# Patient Record
Sex: FEMALE | Race: WHITE | Employment: FULL TIME | URBAN - NONMETROPOLITAN AREA
[De-identification: names, ages, dates, MRNs, and addresses within clinical notes are randomized per-mention and may not be internally consistent; named-entity substitution may affect disease eponyms.]

---

## 2017-02-26 DIAGNOSIS — E11.8 TYPE 2 DIABETES MELLITUS WITH COMPLICATION (HCC): ICD-10-CM

## 2017-02-27 RX ORDER — LOSARTAN POTASSIUM AND HYDROCHLOROTHIAZIDE 12.5; 5 MG/1; MG/1
TABLET ORAL
Qty: 180 TABLET | Refills: 3 | Status: SHIPPED | OUTPATIENT
Start: 2017-02-27 | End: 2018-04-27 | Stop reason: SDUPTHER

## 2017-02-27 RX ORDER — PIOGLITAZONEHYDROCHLORIDE 30 MG/1
TABLET ORAL
Qty: 90 TABLET | Refills: 3 | Status: SHIPPED | OUTPATIENT
Start: 2017-02-27 | End: 2018-03-27

## 2017-03-24 ENCOUNTER — OFFICE VISIT (OUTPATIENT)
Dept: FAMILY MEDICINE CLINIC | Age: 77
End: 2017-03-24

## 2017-03-24 VITALS — SYSTOLIC BLOOD PRESSURE: 128 MMHG | OXYGEN SATURATION: 98 % | DIASTOLIC BLOOD PRESSURE: 74 MMHG | HEART RATE: 82 BPM

## 2017-03-24 DIAGNOSIS — I83.019 VENOUS ULCER OF RIGHT LEG (HCC): ICD-10-CM

## 2017-03-24 DIAGNOSIS — E11.8 TYPE 2 DIABETES MELLITUS WITH COMPLICATION, UNSPECIFIED LONG TERM INSULIN USE STATUS: Primary | ICD-10-CM

## 2017-03-24 DIAGNOSIS — K64.4 BLEEDING EXTERNAL HEMORRHOIDS: ICD-10-CM

## 2017-03-24 DIAGNOSIS — M15.9 PRIMARY OSTEOARTHRITIS INVOLVING MULTIPLE JOINTS: ICD-10-CM

## 2017-03-24 DIAGNOSIS — E11.21 DIABETIC NEPHROPATHY ASSOCIATED WITH TYPE 2 DIABETES MELLITUS (HCC): ICD-10-CM

## 2017-03-24 DIAGNOSIS — I87.2 VENOUS INSUFFICIENCY: ICD-10-CM

## 2017-03-24 DIAGNOSIS — I10 BENIGN ESSENTIAL HTN: ICD-10-CM

## 2017-03-24 DIAGNOSIS — E78.2 MIXED HYPERLIPIDEMIA: ICD-10-CM

## 2017-03-24 DIAGNOSIS — L97.919 VENOUS ULCER OF RIGHT LEG (HCC): ICD-10-CM

## 2017-03-24 DIAGNOSIS — D50.8 OTHER IRON DEFICIENCY ANEMIA: ICD-10-CM

## 2017-03-24 DIAGNOSIS — I70.1 RENAL ARTERY STENOSIS (HCC): ICD-10-CM

## 2017-03-24 PROBLEM — M15.0 PRIMARY OSTEOARTHRITIS INVOLVING MULTIPLE JOINTS: Status: ACTIVE | Noted: 2017-03-24

## 2017-03-24 LAB
ANION GAP SERPL CALCULATED.3IONS-SCNC: 15 MMOL/L (ref 3–16)
BASOPHILS ABSOLUTE: 0.1 K/UL (ref 0–0.2)
BASOPHILS RELATIVE PERCENT: 1.1 %
BUN BLDV-MCNC: 25 MG/DL (ref 7–20)
CALCIUM SERPL-MCNC: 9.2 MG/DL (ref 8.3–10.6)
CHLORIDE BLD-SCNC: 102 MMOL/L (ref 99–110)
CO2: 24 MMOL/L (ref 21–32)
CREAT SERPL-MCNC: 1.1 MG/DL (ref 0.6–1.2)
EOSINOPHILS ABSOLUTE: 0.2 K/UL (ref 0–0.6)
EOSINOPHILS RELATIVE PERCENT: 2.9 %
GFR AFRICAN AMERICAN: 58
GFR NON-AFRICAN AMERICAN: 48
GLUCOSE BLD-MCNC: 120 MG/DL (ref 70–99)
HCT VFR BLD CALC: 33.6 % (ref 36–48)
HEMOGLOBIN: 10.7 G/DL (ref 12–16)
LYMPHOCYTES ABSOLUTE: 1.7 K/UL (ref 1–5.1)
LYMPHOCYTES RELATIVE PERCENT: 22.5 %
MCH RBC QN AUTO: 26.9 PG (ref 26–34)
MCHC RBC AUTO-ENTMCNC: 31.7 G/DL (ref 31–36)
MCV RBC AUTO: 84.9 FL (ref 80–100)
MONOCYTES ABSOLUTE: 0.7 K/UL (ref 0–1.3)
MONOCYTES RELATIVE PERCENT: 8.9 %
NEUTROPHILS ABSOLUTE: 5 K/UL (ref 1.7–7.7)
NEUTROPHILS RELATIVE PERCENT: 64.6 %
PDW BLD-RTO: 15 % (ref 12.4–15.4)
PLATELET # BLD: 320 K/UL (ref 135–450)
PMV BLD AUTO: 8.3 FL (ref 5–10.5)
POTASSIUM SERPL-SCNC: 4.6 MMOL/L (ref 3.5–5.1)
RBC # BLD: 3.96 M/UL (ref 4–5.2)
SODIUM BLD-SCNC: 141 MMOL/L (ref 136–145)
WBC # BLD: 7.7 K/UL (ref 4–11)

## 2017-03-24 PROCEDURE — G8484 FLU IMMUNIZE NO ADMIN: HCPCS | Performed by: FAMILY MEDICINE

## 2017-03-24 PROCEDURE — 4040F PNEUMOC VAC/ADMIN/RCVD: CPT | Performed by: FAMILY MEDICINE

## 2017-03-24 PROCEDURE — 1123F ACP DISCUSS/DSCN MKR DOCD: CPT | Performed by: FAMILY MEDICINE

## 2017-03-24 PROCEDURE — 36415 COLL VENOUS BLD VENIPUNCTURE: CPT | Performed by: FAMILY MEDICINE

## 2017-03-24 PROCEDURE — 1036F TOBACCO NON-USER: CPT | Performed by: FAMILY MEDICINE

## 2017-03-24 PROCEDURE — G8598 ASA/ANTIPLAT THER USED: HCPCS | Performed by: FAMILY MEDICINE

## 2017-03-24 PROCEDURE — G8400 PT W/DXA NO RESULTS DOC: HCPCS | Performed by: FAMILY MEDICINE

## 2017-03-24 PROCEDURE — G8417 CALC BMI ABV UP PARAM F/U: HCPCS | Performed by: FAMILY MEDICINE

## 2017-03-24 PROCEDURE — G8427 DOCREV CUR MEDS BY ELIG CLIN: HCPCS | Performed by: FAMILY MEDICINE

## 2017-03-24 PROCEDURE — 99214 OFFICE O/P EST MOD 30 MIN: CPT | Performed by: FAMILY MEDICINE

## 2017-03-24 PROCEDURE — 1090F PRES/ABSN URINE INCON ASSESS: CPT | Performed by: FAMILY MEDICINE

## 2017-03-24 ASSESSMENT — PATIENT HEALTH QUESTIONNAIRE - PHQ9
1. LITTLE INTEREST OR PLEASURE IN DOING THINGS: 0
SUM OF ALL RESPONSES TO PHQ QUESTIONS 1-9: 0
SUM OF ALL RESPONSES TO PHQ9 QUESTIONS 1 & 2: 0
2. FEELING DOWN, DEPRESSED OR HOPELESS: 0

## 2017-03-25 LAB
ESTIMATED AVERAGE GLUCOSE: 142.7 MG/DL
HBA1C MFR BLD: 6.6 %

## 2017-07-03 DIAGNOSIS — E11.8 TYPE 2 DIABETES MELLITUS WITH COMPLICATION (HCC): ICD-10-CM

## 2017-07-25 RX ORDER — CELECOXIB 200 MG/1
200 CAPSULE ORAL 2 TIMES DAILY
Qty: 180 CAPSULE | Refills: 3 | Status: ON HOLD | OUTPATIENT
Start: 2017-07-25 | End: 2019-07-05 | Stop reason: HOSPADM

## 2017-09-22 ENCOUNTER — OFFICE VISIT (OUTPATIENT)
Dept: FAMILY MEDICINE CLINIC | Age: 77
End: 2017-09-22

## 2017-09-22 VITALS — OXYGEN SATURATION: 97 % | DIASTOLIC BLOOD PRESSURE: 74 MMHG | SYSTOLIC BLOOD PRESSURE: 130 MMHG | HEART RATE: 91 BPM

## 2017-09-22 DIAGNOSIS — D64.9 ANEMIA, UNSPECIFIED TYPE: ICD-10-CM

## 2017-09-22 DIAGNOSIS — E11.8 TYPE 2 DIABETES MELLITUS WITH COMPLICATION, UNSPECIFIED LONG TERM INSULIN USE STATUS: ICD-10-CM

## 2017-09-22 DIAGNOSIS — E78.2 MIXED HYPERLIPIDEMIA: ICD-10-CM

## 2017-09-22 DIAGNOSIS — I10 BENIGN ESSENTIAL HTN: ICD-10-CM

## 2017-09-22 DIAGNOSIS — M15.9 PRIMARY OSTEOARTHRITIS INVOLVING MULTIPLE JOINTS: Primary | ICD-10-CM

## 2017-09-22 DIAGNOSIS — I87.2 VENOUS INSUFFICIENCY: ICD-10-CM

## 2017-09-22 DIAGNOSIS — K64.4 BLEEDING EXTERNAL HEMORRHOIDS: ICD-10-CM

## 2017-09-22 DIAGNOSIS — I70.1 RENAL ARTERY STENOSIS (HCC): ICD-10-CM

## 2017-09-22 DIAGNOSIS — M65.332 TRIGGER MIDDLE FINGER OF LEFT HAND: ICD-10-CM

## 2017-09-22 DIAGNOSIS — I65.29 STENOSIS OF CAROTID ARTERY, UNSPECIFIED LATERALITY: ICD-10-CM

## 2017-09-22 LAB
ANION GAP SERPL CALCULATED.3IONS-SCNC: 17 MMOL/L (ref 3–16)
BASOPHILS ABSOLUTE: 0.1 K/UL (ref 0–0.2)
BASOPHILS RELATIVE PERCENT: 0.5 %
BUN BLDV-MCNC: 27 MG/DL (ref 7–20)
CALCIUM SERPL-MCNC: 9.1 MG/DL (ref 8.3–10.6)
CHLORIDE BLD-SCNC: 102 MMOL/L (ref 99–110)
CO2: 22 MMOL/L (ref 21–32)
CREAT SERPL-MCNC: 1.1 MG/DL (ref 0.6–1.2)
EOSINOPHILS ABSOLUTE: 0.2 K/UL (ref 0–0.6)
EOSINOPHILS RELATIVE PERCENT: 2.3 %
GFR AFRICAN AMERICAN: 58
GFR NON-AFRICAN AMERICAN: 48
GLUCOSE BLD-MCNC: 256 MG/DL (ref 70–99)
HCT VFR BLD CALC: 33.7 % (ref 36–48)
HEMOGLOBIN: 10.7 G/DL (ref 12–16)
LYMPHOCYTES ABSOLUTE: 1.7 K/UL (ref 1–5.1)
LYMPHOCYTES RELATIVE PERCENT: 18.3 %
MCH RBC QN AUTO: 26.9 PG (ref 26–34)
MCHC RBC AUTO-ENTMCNC: 31.9 G/DL (ref 31–36)
MCV RBC AUTO: 84.3 FL (ref 80–100)
MONOCYTES ABSOLUTE: 0.7 K/UL (ref 0–1.3)
MONOCYTES RELATIVE PERCENT: 7.5 %
NEUTROPHILS ABSOLUTE: 6.7 K/UL (ref 1.7–7.7)
NEUTROPHILS RELATIVE PERCENT: 71.4 %
PDW BLD-RTO: 15.6 % (ref 12.4–15.4)
PLATELET # BLD: 316 K/UL (ref 135–450)
PMV BLD AUTO: 8.4 FL (ref 5–10.5)
POTASSIUM SERPL-SCNC: 4.4 MMOL/L (ref 3.5–5.1)
RBC # BLD: 3.99 M/UL (ref 4–5.2)
SODIUM BLD-SCNC: 141 MMOL/L (ref 136–145)
WBC # BLD: 9.4 K/UL (ref 4–11)

## 2017-09-22 PROCEDURE — 36415 COLL VENOUS BLD VENIPUNCTURE: CPT | Performed by: FAMILY MEDICINE

## 2017-09-22 PROCEDURE — G8427 DOCREV CUR MEDS BY ELIG CLIN: HCPCS | Performed by: FAMILY MEDICINE

## 2017-09-22 PROCEDURE — 1123F ACP DISCUSS/DSCN MKR DOCD: CPT | Performed by: FAMILY MEDICINE

## 2017-09-22 PROCEDURE — 1036F TOBACCO NON-USER: CPT | Performed by: FAMILY MEDICINE

## 2017-09-22 PROCEDURE — 1090F PRES/ABSN URINE INCON ASSESS: CPT | Performed by: FAMILY MEDICINE

## 2017-09-22 PROCEDURE — G8421 BMI NOT CALCULATED: HCPCS | Performed by: FAMILY MEDICINE

## 2017-09-22 PROCEDURE — G8598 ASA/ANTIPLAT THER USED: HCPCS | Performed by: FAMILY MEDICINE

## 2017-09-22 PROCEDURE — 99214 OFFICE O/P EST MOD 30 MIN: CPT | Performed by: FAMILY MEDICINE

## 2017-09-22 PROCEDURE — 4040F PNEUMOC VAC/ADMIN/RCVD: CPT | Performed by: FAMILY MEDICINE

## 2017-09-22 PROCEDURE — G8400 PT W/DXA NO RESULTS DOC: HCPCS | Performed by: FAMILY MEDICINE

## 2017-09-23 LAB
ESTIMATED AVERAGE GLUCOSE: 154.2 MG/DL
HBA1C MFR BLD: 7 %

## 2017-10-02 DIAGNOSIS — D64.9 ANEMIA, UNSPECIFIED TYPE: Primary | ICD-10-CM

## 2017-10-02 DIAGNOSIS — E11.8 TYPE 2 DIABETES MELLITUS WITH COMPLICATION, UNSPECIFIED LONG TERM INSULIN USE STATUS: ICD-10-CM

## 2017-11-01 DIAGNOSIS — I65.23 BILATERAL CAROTID ARTERY STENOSIS: Primary | ICD-10-CM

## 2017-11-13 RX ORDER — GLIPIZIDE 10 MG/1
TABLET ORAL
Qty: 180 TABLET | Refills: 3 | Status: SHIPPED | OUTPATIENT
Start: 2017-11-13 | End: 2018-12-16 | Stop reason: SDUPTHER

## 2017-11-16 ENCOUNTER — HOSPITAL ENCOUNTER (OUTPATIENT)
Dept: VASCULAR LAB | Age: 77
Discharge: HOME | End: 2017-11-16
Attending: FAMILY MEDICINE | Admitting: FAMILY MEDICINE

## 2017-11-16 DIAGNOSIS — I65.23 OCCLUSION AND STENOSIS OF BILATERAL CAROTID ARTERIES: ICD-10-CM

## 2018-03-16 ENCOUNTER — TELEPHONE (OUTPATIENT)
Dept: FAMILY MEDICINE CLINIC | Age: 78
End: 2018-03-16

## 2018-03-16 DIAGNOSIS — E11.9 TYPE 2 DIABETES MELLITUS WITHOUT COMPLICATION, WITHOUT LONG-TERM CURRENT USE OF INSULIN (HCC): Primary | ICD-10-CM

## 2018-03-16 NOTE — TELEPHONE ENCOUNTER
Attempted to contact patient on 3/16/2018. Result: no answer at the patient's number, no voicemail available. Pre-Visit planning not completed.

## 2018-03-23 ENCOUNTER — OFFICE VISIT (OUTPATIENT)
Dept: FAMILY MEDICINE CLINIC | Age: 78
End: 2018-03-23

## 2018-03-23 VITALS — HEART RATE: 71 BPM | DIASTOLIC BLOOD PRESSURE: 76 MMHG | SYSTOLIC BLOOD PRESSURE: 136 MMHG | OXYGEN SATURATION: 98 %

## 2018-03-23 DIAGNOSIS — E11.51 TYPE 2 DIABETES MELLITUS WITH PERIPHERAL VASCULAR DISEASE (HCC): ICD-10-CM

## 2018-03-23 DIAGNOSIS — I10 BENIGN ESSENTIAL HTN: ICD-10-CM

## 2018-03-23 DIAGNOSIS — I65.29 STENOSIS OF CAROTID ARTERY, UNSPECIFIED LATERALITY: ICD-10-CM

## 2018-03-23 DIAGNOSIS — I87.2 VENOUS INSUFFICIENCY: ICD-10-CM

## 2018-03-23 DIAGNOSIS — E08.22 DIABETES MELLITUS DUE TO UNDERLYING CONDITION WITH STAGE 3 CHRONIC KIDNEY DISEASE, WITHOUT LONG-TERM CURRENT USE OF INSULIN (HCC): ICD-10-CM

## 2018-03-23 DIAGNOSIS — E11.51 DM (DIABETES MELLITUS), TYPE 2 WITH PERIPHERAL VASCULAR COMPLICATIONS (HCC): Primary | ICD-10-CM

## 2018-03-23 DIAGNOSIS — E78.2 MIXED HYPERLIPIDEMIA: ICD-10-CM

## 2018-03-23 DIAGNOSIS — Z23 NEED FOR SHINGLES VACCINE: ICD-10-CM

## 2018-03-23 DIAGNOSIS — I70.1 RENAL ARTERY STENOSIS (HCC): ICD-10-CM

## 2018-03-23 DIAGNOSIS — M15.9 PRIMARY OSTEOARTHRITIS INVOLVING MULTIPLE JOINTS: ICD-10-CM

## 2018-03-23 DIAGNOSIS — N18.30 DIABETES MELLITUS DUE TO UNDERLYING CONDITION WITH STAGE 3 CHRONIC KIDNEY DISEASE, WITHOUT LONG-TERM CURRENT USE OF INSULIN (HCC): ICD-10-CM

## 2018-03-23 LAB
A/G RATIO: 1.4 (ref 1.1–2.2)
ALBUMIN SERPL-MCNC: 4.3 G/DL (ref 3.4–5)
ALP BLD-CCNC: 72 U/L (ref 40–129)
ALT SERPL-CCNC: 12 U/L (ref 10–40)
ANION GAP SERPL CALCULATED.3IONS-SCNC: 16 MMOL/L (ref 3–16)
AST SERPL-CCNC: 16 U/L (ref 15–37)
BILIRUB SERPL-MCNC: <0.2 MG/DL (ref 0–1)
BUN BLDV-MCNC: 28 MG/DL (ref 7–20)
CALCIUM SERPL-MCNC: 8.8 MG/DL (ref 8.3–10.6)
CHLORIDE BLD-SCNC: 101 MMOL/L (ref 99–110)
CHOLESTEROL, FASTING: 242 MG/DL (ref 0–199)
CO2: 23 MMOL/L (ref 21–32)
CREAT SERPL-MCNC: 1.2 MG/DL (ref 0.6–1.2)
CREATININE URINE: 99 MG/DL (ref 28–259)
GFR AFRICAN AMERICAN: 52
GFR NON-AFRICAN AMERICAN: 43
GLOBULIN: 3.1 G/DL
GLUCOSE BLD-MCNC: 99 MG/DL (ref 70–99)
HDLC SERPL-MCNC: 43 MG/DL (ref 40–60)
LDL CHOLESTEROL CALCULATED: 147 MG/DL
MICROALBUMIN UR-MCNC: <1.2 MG/DL
MICROALBUMIN/CREAT UR-RTO: NORMAL MG/G (ref 0–30)
POTASSIUM SERPL-SCNC: 4.9 MMOL/L (ref 3.5–5.1)
SODIUM BLD-SCNC: 140 MMOL/L (ref 136–145)
TOTAL PROTEIN: 7.4 G/DL (ref 6.4–8.2)
TRIGLYCERIDE, FASTING: 258 MG/DL (ref 0–150)
VLDLC SERPL CALC-MCNC: 52 MG/DL

## 2018-03-23 PROCEDURE — 99214 OFFICE O/P EST MOD 30 MIN: CPT | Performed by: FAMILY MEDICINE

## 2018-03-23 PROCEDURE — G8421 BMI NOT CALCULATED: HCPCS | Performed by: FAMILY MEDICINE

## 2018-03-23 PROCEDURE — G8400 PT W/DXA NO RESULTS DOC: HCPCS | Performed by: FAMILY MEDICINE

## 2018-03-23 PROCEDURE — G8484 FLU IMMUNIZE NO ADMIN: HCPCS | Performed by: FAMILY MEDICINE

## 2018-03-23 PROCEDURE — 1090F PRES/ABSN URINE INCON ASSESS: CPT | Performed by: FAMILY MEDICINE

## 2018-03-23 PROCEDURE — 36415 COLL VENOUS BLD VENIPUNCTURE: CPT | Performed by: FAMILY MEDICINE

## 2018-03-23 PROCEDURE — 4040F PNEUMOC VAC/ADMIN/RCVD: CPT | Performed by: FAMILY MEDICINE

## 2018-03-23 PROCEDURE — G8598 ASA/ANTIPLAT THER USED: HCPCS | Performed by: FAMILY MEDICINE

## 2018-03-23 PROCEDURE — 1123F ACP DISCUSS/DSCN MKR DOCD: CPT | Performed by: FAMILY MEDICINE

## 2018-03-23 PROCEDURE — G8427 DOCREV CUR MEDS BY ELIG CLIN: HCPCS | Performed by: FAMILY MEDICINE

## 2018-03-23 PROCEDURE — 1036F TOBACCO NON-USER: CPT | Performed by: FAMILY MEDICINE

## 2018-03-23 RX ORDER — CLOPIDOGREL BISULFATE 75 MG/1
75 TABLET ORAL DAILY
Qty: 30 TABLET | Refills: 3 | Status: CANCELLED | OUTPATIENT
Start: 2018-03-23

## 2018-03-24 LAB
ESTIMATED AVERAGE GLUCOSE: 162.8 MG/DL
HBA1C MFR BLD: 7.3 %

## 2018-03-27 RX ORDER — PIOGLITAZONEHYDROCHLORIDE 45 MG/1
45 TABLET ORAL DAILY
Qty: 30 TABLET | Refills: 3 | Status: SHIPPED | OUTPATIENT
Start: 2018-03-27 | End: 2018-10-01 | Stop reason: SDUPTHER

## 2018-04-27 DIAGNOSIS — E11.8 TYPE 2 DIABETES MELLITUS WITH COMPLICATION (HCC): ICD-10-CM

## 2018-04-27 RX ORDER — LOSARTAN POTASSIUM AND HYDROCHLOROTHIAZIDE 12.5; 5 MG/1; MG/1
TABLET ORAL
Qty: 180 TABLET | Refills: 2 | Status: SHIPPED | OUTPATIENT
Start: 2018-04-27 | End: 2018-10-31 | Stop reason: SDUPTHER

## 2018-04-27 RX ORDER — PIOGLITAZONEHYDROCHLORIDE 30 MG/1
TABLET ORAL
Qty: 90 TABLET | Refills: 2 | Status: SHIPPED | OUTPATIENT
Start: 2018-04-27 | End: 2018-09-18 | Stop reason: DRUGHIGH

## 2018-05-21 DIAGNOSIS — E11.8 TYPE 2 DIABETES MELLITUS WITH COMPLICATION (HCC): ICD-10-CM

## 2018-09-06 ENCOUNTER — TELEPHONE (OUTPATIENT)
Dept: FAMILY MEDICINE CLINIC | Age: 78
End: 2018-09-06

## 2018-09-06 DIAGNOSIS — E11.9 TYPE 2 DIABETES MELLITUS WITHOUT COMPLICATION, WITHOUT LONG-TERM CURRENT USE OF INSULIN (HCC): Primary | ICD-10-CM

## 2018-09-06 NOTE — TELEPHONE ENCOUNTER
Dr. Saint Crate:    Notes: This patient has an upcoming appointment with you for Diabetes and Hypertension. In planning for that visit I have completed the following pre-visit planning:     Pre-Visit Planning Checklist:  Patient contacted: yes  Verified patient by name and date of birth: yes    Health Maintenance items reviewed:    Pneumonia Vaccination:  patient not up to date; declined health this health maintenance recommendation     Labs and procedures pended: Non-Fasting Hemoglobin A1C    Labs and procedures discussed with patient: yes  Reminded patient to check with their insurance company about coverage for lab tests and lab location: no    Preliminary Medication Reconciliation: was performed. Reminded patient to arrive early: yes    Please complete the med-reconciliation and sign the appropriate labs as soon as possible.       Brook Whaley, [address]  Pre-Services Specialist

## 2018-09-18 ENCOUNTER — OFFICE VISIT (OUTPATIENT)
Dept: FAMILY MEDICINE CLINIC | Age: 78
End: 2018-09-18

## 2018-09-18 VITALS — DIASTOLIC BLOOD PRESSURE: 80 MMHG | HEART RATE: 70 BPM | OXYGEN SATURATION: 98 % | SYSTOLIC BLOOD PRESSURE: 190 MMHG

## 2018-09-18 DIAGNOSIS — E11.51 DM (DIABETES MELLITUS), TYPE 2 WITH PERIPHERAL VASCULAR COMPLICATIONS (HCC): Primary | ICD-10-CM

## 2018-09-18 DIAGNOSIS — I65.23 BILATERAL CAROTID ARTERY STENOSIS: ICD-10-CM

## 2018-09-18 DIAGNOSIS — I10 BENIGN ESSENTIAL HTN: ICD-10-CM

## 2018-09-18 DIAGNOSIS — I10 ELEVATED BLOOD PRESSURE READING IN OFFICE WITH DIAGNOSIS OF HYPERTENSION: ICD-10-CM

## 2018-09-18 DIAGNOSIS — I70.1 RENAL ARTERY STENOSIS (HCC): ICD-10-CM

## 2018-09-18 DIAGNOSIS — I87.2 VENOUS INSUFFICIENCY: ICD-10-CM

## 2018-09-18 DIAGNOSIS — E78.2 MIXED HYPERLIPIDEMIA: ICD-10-CM

## 2018-09-18 LAB
ANION GAP SERPL CALCULATED.3IONS-SCNC: 14 MMOL/L (ref 3–16)
BUN BLDV-MCNC: 34 MG/DL (ref 7–20)
CALCIUM SERPL-MCNC: 9.5 MG/DL (ref 8.3–10.6)
CHLORIDE BLD-SCNC: 106 MMOL/L (ref 99–110)
CO2: 24 MMOL/L (ref 21–32)
CREAT SERPL-MCNC: 1.1 MG/DL (ref 0.6–1.2)
GFR AFRICAN AMERICAN: 58
GFR NON-AFRICAN AMERICAN: 48
GLUCOSE BLD-MCNC: 98 MG/DL (ref 70–99)
HBA1C MFR BLD: 6.9 %
POTASSIUM SERPL-SCNC: 4.9 MMOL/L (ref 3.5–5.1)
SODIUM BLD-SCNC: 144 MMOL/L (ref 136–145)

## 2018-09-18 PROCEDURE — 1101F PT FALLS ASSESS-DOCD LE1/YR: CPT | Performed by: FAMILY MEDICINE

## 2018-09-18 PROCEDURE — 36415 COLL VENOUS BLD VENIPUNCTURE: CPT | Performed by: FAMILY MEDICINE

## 2018-09-18 PROCEDURE — G8510 SCR DEP NEG, NO PLAN REQD: HCPCS | Performed by: FAMILY MEDICINE

## 2018-09-18 PROCEDURE — G8400 PT W/DXA NO RESULTS DOC: HCPCS | Performed by: FAMILY MEDICINE

## 2018-09-18 PROCEDURE — 83036 HEMOGLOBIN GLYCOSYLATED A1C: CPT | Performed by: FAMILY MEDICINE

## 2018-09-18 PROCEDURE — 1123F ACP DISCUSS/DSCN MKR DOCD: CPT | Performed by: FAMILY MEDICINE

## 2018-09-18 PROCEDURE — 1090F PRES/ABSN URINE INCON ASSESS: CPT | Performed by: FAMILY MEDICINE

## 2018-09-18 PROCEDURE — 1036F TOBACCO NON-USER: CPT | Performed by: FAMILY MEDICINE

## 2018-09-18 PROCEDURE — G8598 ASA/ANTIPLAT THER USED: HCPCS | Performed by: FAMILY MEDICINE

## 2018-09-18 PROCEDURE — G8427 DOCREV CUR MEDS BY ELIG CLIN: HCPCS | Performed by: FAMILY MEDICINE

## 2018-09-18 PROCEDURE — 4040F PNEUMOC VAC/ADMIN/RCVD: CPT | Performed by: FAMILY MEDICINE

## 2018-09-18 PROCEDURE — 99214 OFFICE O/P EST MOD 30 MIN: CPT | Performed by: FAMILY MEDICINE

## 2018-09-18 PROCEDURE — G8421 BMI NOT CALCULATED: HCPCS | Performed by: FAMILY MEDICINE

## 2018-09-18 ASSESSMENT — PATIENT HEALTH QUESTIONNAIRE - PHQ9
SUM OF ALL RESPONSES TO PHQ QUESTIONS 1-9: 0
1. LITTLE INTEREST OR PLEASURE IN DOING THINGS: 0
2. FEELING DOWN, DEPRESSED OR HOPELESS: 0
SUM OF ALL RESPONSES TO PHQ QUESTIONS 1-9: 0
SUM OF ALL RESPONSES TO PHQ9 QUESTIONS 1 & 2: 0

## 2018-09-18 NOTE — PROGRESS NOTES
Chief Complaint   Patient presents with    Diabetes    Hypertension    Other     carotid stenosis    Other     patient has multiple medical complaints   Hgb A1C has improved. Discussed her renal artery stenosis found in 2016 and her elevated blood pressure today. She states her blood pressure at work is never over 150. She is at the point that she doesn't want a lot of testing done, she will consider the options and let us know once the blood test results are back. Discussed her carotid US is due in November and she states she does not want a stroke, she agrees to proceed with that test.  Once again discussed her elevated cholesterol risk score and this can also lead to stroke. She read on the Celebrex bottle that it can cause hearing loss, so she has cut that back to 1 daily and her ankle is hurting more. HPI:  Nisha Navarro is a 77 y.o. (: 1940) here today for  Diabetes Mellitus Type 2: Current symptoms/problems include none. Medication compliance:  compliant all of the time  Diabetic diet compliance:  compliant most of the time,  Weight trend: unsure as she refuses weighing  Current exercise: no regular exercise  Barriers: impairment:  physical: arthritis    Home blood sugar records: checks at work under 200  Any episodes of hypoglycemia? no  Eye exam current (within one year): no   reports that she has never smoked. She has never used smokeless tobacco.   Daily Aspirin? Yes    Lab Results   Component Value Date    LABA1C 7.3 2018    LABA1C 7.0 2017    LABA1C 6.6 2017     Lab Results   Component Value Date    LABMICR <1.20 2018    CREATININE 1.2 2018     Lab Results   Component Value Date    ALT 12 2018    AST 16 2018     Lab Results   Component Value Date    HDL 43 2018    LDLCALC 147 (H) 2018        Hypertension:  Home blood pressure monitoring: Yes - 150/80's. She is not adherent to a low sodium diet.  Patient denies chest pain and shortness of breath. Antihypertensive medication side effects: no medication side effects noted. Use of agents associated with hypertension: none. Sodium (mmol/L)   Date Value   03/23/2018 140    BUN (mg/dL)   Date Value   03/23/2018 28 (H)    Glucose (mg/dL)   Date Value   03/23/2018 99      Potassium (mmol/L)   Date Value   03/23/2018 4.9    CREATININE (mg/dL)   Date Value   03/23/2018 1.2           Patient's medications, allergies, past medical, surgical, social and family histories were reviewed and updated as appropriate on 9/18/2018 at 7:46 AM.    ROS:  Review of Systems    All other systems reviewed and are negative except as noted above on 9/18/2018 at 7:46 AM. Additional review of systems may be scanned into the media section of this medical record. Any responses requiring further intervention were pursued. Hemoglobin A1C (%)   Date Value   03/23/2018 7.3     Microalbumin, Random Urine (mg/dL)   Date Value   03/23/2018 <1.20     LDL Calculated (mg/dL)   Date Value   03/23/2018 147 (H)     Past Medical History:   Diagnosis Date    Diabetes mellitus ([facility] [address]     Hyperlipidemia     Hypertension     Neuropathy     OA (osteoarthritis) 5/7/2013    Osteoarthritis     Type II or unspecified type diabetes mellitus without mention of complication, not stated as uncontrolled     Venous insufficiency      Family History   Problem Relation Age of Onset    Heart Disease Mother     Heart Disease Father      Social History     Social History    Marital status:      Spouse name: N/A    Number of children: N/A    Years of education: N/A     Occupational History    Not on file.      Social History Main Topics    Smoking status: Never Smoker    Smokeless tobacco: Never Used    Alcohol use No    Drug use: No    Sexual activity: Not Currently     Other Topics Concern    Not on file     Social History Narrative    No narrative on file       Prior to Visit atraumatic. Right Ear: External ear normal.   Left Ear: External ear normal.   Nose: Nose normal.   Lips symmetric   Eyes: Pupils are equal, round, and reactive to light. Lids are normal. Right eye exhibits no discharge. Left eye exhibits no discharge. No scleral icterus. Neck: No JVD present. No thyromegaly present. Cardiovascular: Normal rate, regular rhythm and normal heart sounds. Did not detect any carotid bruit or abdominal bruits. Pulmonary/Chest: Effort normal and breath sounds normal. No respiratory distress. Abdominal: Soft. Normal appearance and bowel sounds are normal. There is no hepatosplenomegaly. There is no tenderness. Musculoskeletal: She exhibits edema (2-3+ LE bilateral). Skin: Skin is warm and dry. No rash noted. She is not diaphoretic. No erythema. No pallor. Turgor normal   Psychiatric: She has a normal mood and affect. Her behavior is normal. Judgment and thought content normal.   Nursing note and vitals reviewed. Results for POC orders placed in visit on 09/18/18   POCT glycosylated hemoglobin (Hb A1C)   Result Value Ref Range    Hemoglobin A1C 6.9 %          ASSESSMENT PLAN      Diagnosis Orders   1. DM (diabetes mellitus), type 2 with peripheral vascular complications (HCC)  POCT glycosylated hemoglobin (Hb A1C)    BASIC METABOLIC PANEL   2. Renal artery stenosis (HCC)  BASIC METABOLIC PANEL   3. Benign essential HTN  BASIC METABOLIC PANEL   4. Venous insufficiency     5. Mixed hyperlipidemia     6. Bilateral carotid artery stenosis     7. Elevated blood pressure reading in office with diagnosis of hypertension     Her A1c indicates improved sugar control, down from 7.3 in March. Her blood pressure was extremely elevated. She states it never is that high.   I reminded her that this could be an indication for renal artery stenosis getting worse however, she does not believe she would do anything about it she would not have any stenting so we will check a BMP and if with the ROS and Past Histories independently gathered by the clinical support staff and the remaining scribed note accurately describes my personal service to the patient.       9/18/2018    8:26 AM

## 2018-10-01 RX ORDER — PIOGLITAZONEHYDROCHLORIDE 45 MG/1
45 TABLET ORAL DAILY
Qty: 90 TABLET | Refills: 0 | Status: SHIPPED | OUTPATIENT
Start: 2018-10-01 | End: 2019-01-06 | Stop reason: SDUPTHER

## 2018-10-13 ENCOUNTER — HOSPITAL ENCOUNTER (EMERGENCY)
Age: 78
Discharge: HOME | End: 2018-10-14
Attending: EMERGENCY MEDICINE
Payer: MEDICARE

## 2018-10-13 DIAGNOSIS — I10 ESSENTIAL HYPERTENSION: Primary | ICD-10-CM

## 2018-10-13 PROCEDURE — 99284 EMERGENCY DEPT VISIT MOD MDM: CPT

## 2018-10-14 ENCOUNTER — APPOINTMENT (OUTPATIENT)
Dept: GENERAL RADIOLOGY | Age: 78
End: 2018-10-14
Payer: MEDICARE

## 2018-10-14 VITALS
SYSTOLIC BLOOD PRESSURE: 179 MMHG | HEART RATE: 69 BPM | TEMPERATURE: 97.9 F | DIASTOLIC BLOOD PRESSURE: 69 MMHG | RESPIRATION RATE: 17 BRPM | WEIGHT: 200 LBS | HEIGHT: 61 IN | BODY MASS INDEX: 37.76 KG/M2 | OXYGEN SATURATION: 99 %

## 2018-10-14 LAB
A/G RATIO: 1.3 (ref 1.1–2.2)
ALBUMIN SERPL-MCNC: 4.5 G/DL (ref 3.4–5)
ALP BLD-CCNC: 79 U/L (ref 40–129)
ALT SERPL-CCNC: 14 U/L (ref 10–40)
ANION GAP SERPL CALCULATED.3IONS-SCNC: 12 MMOL/L (ref 3–16)
AST SERPL-CCNC: 18 U/L (ref 15–37)
BASOPHILS ABSOLUTE: 0.1 K/UL (ref 0–0.2)
BASOPHILS RELATIVE PERCENT: 0.9 %
BILIRUB SERPL-MCNC: <0.2 MG/DL (ref 0–1)
BUN BLDV-MCNC: 27 MG/DL (ref 7–20)
CALCIUM SERPL-MCNC: 9.3 MG/DL (ref 8.3–10.6)
CHLORIDE BLD-SCNC: 102 MMOL/L (ref 99–110)
CO2: 25 MMOL/L (ref 21–32)
CREAT SERPL-MCNC: 1.2 MG/DL (ref 0.6–1.2)
EOSINOPHILS ABSOLUTE: 0.2 K/UL (ref 0–0.6)
EOSINOPHILS RELATIVE PERCENT: 2.1 %
GFR AFRICAN AMERICAN: 52
GFR NON-AFRICAN AMERICAN: 43
GLOBULIN: 3.5 G/DL
GLUCOSE BLD-MCNC: 140 MG/DL (ref 70–99)
HCT VFR BLD CALC: 32.7 % (ref 36–48)
HEMOGLOBIN: 10.7 G/DL (ref 12–16)
LYMPHOCYTES ABSOLUTE: 2.3 K/UL (ref 1–5.1)
LYMPHOCYTES RELATIVE PERCENT: 26.6 %
MCH RBC QN AUTO: 27.3 PG (ref 26–34)
MCHC RBC AUTO-ENTMCNC: 32.6 G/DL (ref 31–36)
MCV RBC AUTO: 83.7 FL (ref 80–100)
MONOCYTES ABSOLUTE: 0.8 K/UL (ref 0–1.3)
MONOCYTES RELATIVE PERCENT: 9.4 %
NEUTROPHILS ABSOLUTE: 5.3 K/UL (ref 1.7–7.7)
NEUTROPHILS RELATIVE PERCENT: 61 %
PDW BLD-RTO: 15.5 % (ref 12.4–15.4)
PLATELET # BLD: 325 K/UL (ref 135–450)
PMV BLD AUTO: 7.8 FL (ref 5–10.5)
POTASSIUM SERPL-SCNC: 4.6 MMOL/L (ref 3.5–5.1)
RBC # BLD: 3.9 M/UL (ref 4–5.2)
SODIUM BLD-SCNC: 139 MMOL/L (ref 136–145)
TOTAL PROTEIN: 8 G/DL (ref 6.4–8.2)
TROPONIN: <0.01 NG/ML
WBC # BLD: 8.7 K/UL (ref 4–11)

## 2018-10-14 PROCEDURE — 71046 X-RAY EXAM CHEST 2 VIEWS: CPT

## 2018-10-14 PROCEDURE — 93005 ELECTROCARDIOGRAM TRACING: CPT | Performed by: EMERGENCY MEDICINE

## 2018-10-14 PROCEDURE — 93010 ELECTROCARDIOGRAM REPORT: CPT | Performed by: INTERNAL MEDICINE

## 2018-10-14 PROCEDURE — 85025 COMPLETE CBC W/AUTO DIFF WBC: CPT

## 2018-10-14 PROCEDURE — 80053 COMPREHEN METABOLIC PANEL: CPT

## 2018-10-14 PROCEDURE — 84484 ASSAY OF TROPONIN QUANT: CPT

## 2018-10-14 NOTE — ED PROVIDER NOTES
[address] ED      CHIEF COMPLAINT  Hypertension (Pt brought in by family for HTN. Pt states that she was at her PCP 2 weeks ago and had high blood pressure. Pt reports that she has been routinel checking\" her BP. Pt has list of what her BPs have been. 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 \" Pt denies headache, chest pain, or SOB. Pt states that her BP was in the 200's tonight.)       HISTORY OF PRESENT ILLNESS  Della Minor is a 77 y.o. female  who presents to the ED complaining of elevated blood pressures. Patient states that her blood pressures have been trimming outpoured but that they were 246/143 tonight she struck her evening blood pressure medicines and then it was 204/114 so she took an extra dose of her blood pressure medication. She became concerned about how high it was so came here for further evaluation. She denies any headache. No chest pain although she states that at times she gets twinges of chest pains but no current chest pain. She has any shortness of breath. No leg swelling other than her left leg intermittently swells but not anything more than usual.  She denies any focal numbness, tingling or weakness. She did have similar much for dinner with multiple lunch meats on including ham, turkey and roast beef. No other complaints, modifying factors or associated symptoms. I have reviewed the following from the nursing documentation.     Past Medical History:   Diagnosis Date    Diabetes mellitus ([address]     Hyperlipidemia     Hypertension     Neuropathy     OA (osteoarthritis) 2013    Osteoarthritis     Type II or unspecified type diabetes mellitus without mention of complication, not stated as uncontrolled     Venous insufficiency      Past Surgical History:   Procedure Laterality Date    CARPAL TUNNEL RELEASE       SECTION      KNEE SURGERY Bilateral     TOTAL KNEE ARTHROPLASTY  11    Bilateral Total Knee Replacements     Family History   Problem Relation Age of Onset    Heart Disease Mother     Heart Disease Father      Social History     Social History    Marital status:      Spouse name: N/A    Number of children: N/A    Years of education: N/A     Occupational History    Not on file. Social History Main Topics    Smoking status: Never Smoker    Smokeless tobacco: Never Used    Alcohol use No    Drug use: No    Sexual activity: Not Currently     Other Topics Concern    Not on file     Social History Narrative    No narrative on file     No current facility-administered medications for this encounter. Current Outpatient Prescriptions   Medication Sig Dispense Refill    pioglitazone (ACTOS) 45 MG tablet Take 1 tablet by mouth daily 90 tablet 0    metFORMIN (GLUCOPHAGE) 500 MG tablet TAKE TWO TABLETS BY MOUTH TWICE A  tablet 3    losartan-hydrochlorothiazide (HYZAAR) 50-12.5 MG per tablet TAKE TWO TABLETS BY MOUTH DAILY 180 tablet 2    zoster recombinant adjuvanted vaccine (SHINGRIX) 50 MCG SUSR injection Give 0.5 ml IM now, and repeat 0.5 ml IM in 2 months 0.5 mL 1    metoprolol tartrate (LOPRESSOR) 25 MG tablet TAKE ONE TABLET BY MOUTH TWICE A  tablet 3    glipiZIDE (GLUCOTROL) 10 MG tablet TAKE ONE TABLET BY MOUTH TWICE A  tablet 3    celecoxib (CELEBREX) 200 MG capsule Take 1 capsule by mouth 2 times daily 180 capsule 3    aspirin 81 MG tablet Take 81 mg by mouth daily      vitamin B-12 (CYANOCOBALAMIN) 500 MCG tablet Take 500 mcg by mouth daily.  omeprazole (PRILOSEC) 20 MG capsule Take 20 mg by mouth daily. Allergies   Allergen Reactions    Other Other (See Comments)     PURACOL AG dressing - burning       REVIEW OF SYSTEMS  10 systems reviewed, pertinent positives per HPI otherwise noted to be negative.     PHYSICAL EXAM  BP (!) 179/69   Pulse 69   Temp 97.9 °F (36.6 °C) (Oral)   Resp 17   Ht 5' 1\" (1.549 m)   Wt 200 lb (90.7 kg)   LMP Patient will keep a log of her blood pressures as well as her diet and will follow closely with her PCP. She is to continue her current medications as prescribed. All questions answered at time of discharge. I estimate there is LOW risk for ACUTE CORONARY SYNDROME, INTRACRANIAL HEMORRHAGE, MALIGNANT DYSRHYTHMIA or HYPERTENSION, PULMONARY EMBOLISM, SEPSIS, SUBARACHNOID HEMORRHAGE, SUBDURAL HEMATOMA, STROKE, or THORACIC AORTIC DISSECTION, thus I consider the discharge disposition reasonable. Della Minor and I have discussed the diagnosis and risks, and we agree with discharging home to follow-up with their primary doctor. We also discussed returning to the Emergency Department immediately if new or worsening symptoms occur. We have discussed the symptoms which are most concerning (e.g., bloody sputum, fever, worsening pain or shortness of breath, vomiting, weakness) that necessitate immediate return. During the patient's ED course, the patient was given:  Medications - No data to display     CLINICAL IMPRESSION  1. Essential hypertension        Blood pressure (!) 179/69, pulse 69, temperature 97.9 °F (36.6 °C), temperature source Oral, resp. rate 17, height 5' 1\" (1.549 m), weight 200 lb (90.7 kg), SpO2 99 %, not currently breastfeeding. DISPOSITION  Della Minor was discharged to home in stable condition. Patient was given scripts for the following medications. I counseled patient how to take these medications. Discharge Medication List as of 10/14/2018 12:39 AM          Follow-up with:  Layo Gorman MD  [address] [place] [address]. [place]  [phone]    Schedule an appointment as soon as possible for a visit in 2 days  for recheck and recheck of your blood pressure      DISCLAIMER: This chart was created using Dragon dictation software.   Efforts were made by me to ensure accuracy, however some errors may be present due to limitations of this technology and occasionally words are not transcribed correctly.        Kristina Vergara MD  10/14/18 [id number]

## 2018-10-15 ENCOUNTER — TELEPHONE (OUTPATIENT)
Dept: FAMILY MEDICINE CLINIC | Age: 78
End: 2018-10-15

## 2018-10-15 NOTE — TELEPHONE ENCOUNTER
Pt said that she spoke to you yesterday and was told to up her blood pressure meds said that she took it this morning and it was 165/102 Pulse 92 left arm and 167/101 Pulse 91 Right arm then she rechecked it after she had been up for awhile and it was 155/90 Pulse 85 Right arm and she uses [facility] [address]

## 2018-10-16 ENCOUNTER — TELEPHONE (OUTPATIENT)
Dept: FAMILY MEDICINE CLINIC | Age: 78
End: 2018-10-16

## 2018-10-16 LAB
EKG ATRIAL RATE: 67 BPM
EKG DIAGNOSIS: NORMAL
EKG P AXIS: 50 DEGREES
EKG P-R INTERVAL: 128 MS
EKG Q-T INTERVAL: 392 MS
EKG QRS DURATION: 76 MS
EKG QTC CALCULATION (BAZETT): 414 MS
EKG R AXIS: 38 DEGREES
EKG T AXIS: 43 DEGREES
EKG VENTRICULAR RATE: 67 BPM

## 2018-10-17 RX ORDER — AMLODIPINE BESYLATE 5 MG/1
5 TABLET ORAL DAILY
Qty: 30 TABLET | Refills: 3 | Status: SHIPPED | OUTPATIENT
Start: 2018-10-17 | End: 2018-10-19 | Stop reason: SDUPTHER

## 2018-10-19 ENCOUNTER — TELEPHONE (OUTPATIENT)
Dept: FAMILY MEDICINE CLINIC | Age: 78
End: 2018-10-19

## 2018-10-19 DIAGNOSIS — I10 ESSENTIAL HYPERTENSION: Primary | ICD-10-CM

## 2018-10-19 RX ORDER — AMLODIPINE BESYLATE 5 MG/1
5 TABLET ORAL EVERY 12 HOURS
Qty: 60 TABLET | Refills: 1 | Status: SHIPPED | OUTPATIENT
Start: 2018-10-19 | End: 2018-10-31 | Stop reason: SDUPTHER

## 2018-10-26 ENCOUNTER — TELEPHONE (OUTPATIENT)
Dept: FAMILY MEDICINE CLINIC | Age: 78
End: 2018-10-26

## 2018-10-29 RX ORDER — METOPROLOL TARTRATE 50 MG/1
TABLET, FILM COATED ORAL
Qty: 60 TABLET | Refills: 11 | Status: SHIPPED | OUTPATIENT
Start: 2018-10-29 | End: 2018-10-31 | Stop reason: SDUPTHER

## 2018-10-31 DIAGNOSIS — I10 ESSENTIAL HYPERTENSION: ICD-10-CM

## 2018-10-31 RX ORDER — LOSARTAN POTASSIUM AND HYDROCHLOROTHIAZIDE 12.5; 5 MG/1; MG/1
TABLET ORAL
Qty: 180 TABLET | Refills: 3 | Status: SHIPPED
Start: 2018-10-31 | End: 2020-04-28 | Stop reason: RX

## 2018-10-31 RX ORDER — METOPROLOL TARTRATE 50 MG/1
TABLET, FILM COATED ORAL
Qty: 270 TABLET | Refills: 3 | Status: SHIPPED | OUTPATIENT
Start: 2018-10-31 | End: 2019-01-08 | Stop reason: ALTCHOICE

## 2018-10-31 RX ORDER — AMLODIPINE BESYLATE 5 MG/1
5 TABLET ORAL EVERY 12 HOURS
Qty: 180 TABLET | Refills: 3 | Status: SHIPPED | OUTPATIENT
Start: 2018-10-31 | End: 2020-01-27

## 2018-10-31 NOTE — TELEPHONE ENCOUNTER
Please refer to the medication list.  I personally have sent the 3 blood pressure medications to [facility] 3 months with 3 refills as I want them taken.

## 2018-11-19 ENCOUNTER — HOSPITAL ENCOUNTER (OUTPATIENT)
Dept: VASCULAR LAB | Age: 78
Discharge: HOME | End: 2018-11-19
Payer: MEDICARE

## 2018-11-19 PROCEDURE — 93880 EXTRACRANIAL BILAT STUDY: CPT

## 2018-12-12 ENCOUNTER — TELEPHONE (OUTPATIENT)
Dept: FAMILY MEDICINE CLINIC | Age: 78
End: 2018-12-12

## 2018-12-12 NOTE — TELEPHONE ENCOUNTER
Called patient about her blood pressure readings and she is a little concerned that they are running that high. I have attached the readings and advised her I would double check to see if there was anything we could increase or add to help bring these down.

## 2019-03-19 ENCOUNTER — OFFICE VISIT (OUTPATIENT)
Dept: FAMILY MEDICINE CLINIC | Age: 79
End: 2019-03-19
Payer: MEDICARE

## 2019-03-19 VITALS
HEIGHT: 61 IN | HEART RATE: 77 BPM | OXYGEN SATURATION: 95 % | BODY MASS INDEX: 37.79 KG/M2 | DIASTOLIC BLOOD PRESSURE: 62 MMHG | SYSTOLIC BLOOD PRESSURE: 157 MMHG

## 2019-03-19 DIAGNOSIS — I10 BENIGN ESSENTIAL HTN: ICD-10-CM

## 2019-03-19 DIAGNOSIS — N18.30 CHRONIC KIDNEY DISEASE, STAGE III (MODERATE) (HCC): ICD-10-CM

## 2019-03-19 DIAGNOSIS — E11.9 TYPE 2 DIABETES MELLITUS WITHOUT COMPLICATION, WITH LONG-TERM CURRENT USE OF INSULIN (HCC): Primary | ICD-10-CM

## 2019-03-19 DIAGNOSIS — M15.9 PRIMARY OSTEOARTHRITIS INVOLVING MULTIPLE JOINTS: ICD-10-CM

## 2019-03-19 DIAGNOSIS — I10 UNCONTROLLED HYPERTENSION: ICD-10-CM

## 2019-03-19 DIAGNOSIS — E78.2 MIXED HYPERLIPIDEMIA: ICD-10-CM

## 2019-03-19 DIAGNOSIS — Z79.4 TYPE 2 DIABETES MELLITUS WITHOUT COMPLICATION, WITH LONG-TERM CURRENT USE OF INSULIN (HCC): Primary | ICD-10-CM

## 2019-03-19 DIAGNOSIS — I70.1 RENAL ARTERY STENOSIS (HCC): ICD-10-CM

## 2019-03-19 DIAGNOSIS — E11.8 TYPE 2 DIABETES MELLITUS WITH COMPLICATION, UNSPECIFIED WHETHER LONG TERM INSULIN USE: ICD-10-CM

## 2019-03-19 DIAGNOSIS — E11.51 DM (DIABETES MELLITUS), TYPE 2 WITH PERIPHERAL VASCULAR COMPLICATIONS (HCC): ICD-10-CM

## 2019-03-19 LAB
ANION GAP SERPL CALCULATED.3IONS-SCNC: 19 MMOL/L (ref 3–16)
BUN BLDV-MCNC: 37 MG/DL (ref 7–20)
CALCIUM SERPL-MCNC: 9.5 MG/DL (ref 8.3–10.6)
CHLORIDE BLD-SCNC: 101 MMOL/L (ref 99–110)
CO2: 23 MMOL/L (ref 21–32)
CREAT SERPL-MCNC: 1.5 MG/DL (ref 0.6–1.2)
CREATININE URINE: 36 MG/DL (ref 28–259)
GFR AFRICAN AMERICAN: 40
GFR NON-AFRICAN AMERICAN: 33
GLUCOSE BLD-MCNC: 93 MG/DL (ref 70–99)
HBA1C MFR BLD: 6.8 %
MICROALBUMIN UR-MCNC: <1.2 MG/DL
MICROALBUMIN/CREAT UR-RTO: NORMAL MG/G (ref 0–30)
POTASSIUM SERPL-SCNC: 5.1 MMOL/L (ref 3.5–5.1)
SODIUM BLD-SCNC: 143 MMOL/L (ref 136–145)

## 2019-03-19 PROCEDURE — 1101F PT FALLS ASSESS-DOCD LE1/YR: CPT | Performed by: FAMILY MEDICINE

## 2019-03-19 PROCEDURE — 83036 HEMOGLOBIN GLYCOSYLATED A1C: CPT | Performed by: FAMILY MEDICINE

## 2019-03-19 PROCEDURE — 36415 COLL VENOUS BLD VENIPUNCTURE: CPT | Performed by: FAMILY MEDICINE

## 2019-03-19 PROCEDURE — 99214 OFFICE O/P EST MOD 30 MIN: CPT | Performed by: FAMILY MEDICINE

## 2019-03-19 PROCEDURE — 1036F TOBACCO NON-USER: CPT | Performed by: FAMILY MEDICINE

## 2019-03-19 PROCEDURE — 4040F PNEUMOC VAC/ADMIN/RCVD: CPT | Performed by: FAMILY MEDICINE

## 2019-03-19 PROCEDURE — G8417 CALC BMI ABV UP PARAM F/U: HCPCS | Performed by: FAMILY MEDICINE

## 2019-03-19 PROCEDURE — 1090F PRES/ABSN URINE INCON ASSESS: CPT | Performed by: FAMILY MEDICINE

## 2019-03-19 PROCEDURE — G8427 DOCREV CUR MEDS BY ELIG CLIN: HCPCS | Performed by: FAMILY MEDICINE

## 2019-03-19 PROCEDURE — G8400 PT W/DXA NO RESULTS DOC: HCPCS | Performed by: FAMILY MEDICINE

## 2019-03-19 PROCEDURE — G8484 FLU IMMUNIZE NO ADMIN: HCPCS | Performed by: FAMILY MEDICINE

## 2019-03-19 PROCEDURE — 1123F ACP DISCUSS/DSCN MKR DOCD: CPT | Performed by: FAMILY MEDICINE

## 2019-03-19 PROCEDURE — G8598 ASA/ANTIPLAT THER USED: HCPCS | Performed by: FAMILY MEDICINE

## 2019-03-19 RX ORDER — METOPROLOL TARTRATE 100 MG/1
100 TABLET ORAL 2 TIMES DAILY
Qty: 90 TABLET | Refills: 3 | Status: SHIPPED | OUTPATIENT
Start: 2019-03-19 | End: 2019-07-01 | Stop reason: ALTCHOICE

## 2019-03-19 ASSESSMENT — PATIENT HEALTH QUESTIONNAIRE - PHQ9
1. LITTLE INTEREST OR PLEASURE IN DOING THINGS: 0
SUM OF ALL RESPONSES TO PHQ9 QUESTIONS 1 & 2: 0
2. FEELING DOWN, DEPRESSED OR HOPELESS: 0
SUM OF ALL RESPONSES TO PHQ QUESTIONS 1-9: 0
SUM OF ALL RESPONSES TO PHQ QUESTIONS 1-9: 0

## 2019-03-20 DIAGNOSIS — N28.9 DECREASED RENAL FUNCTION: Primary | ICD-10-CM

## 2019-04-05 RX ORDER — GLIPIZIDE 10 MG/1
TABLET ORAL
Qty: 180 TABLET | Refills: 0 | Status: SHIPPED | OUTPATIENT
Start: 2019-04-05 | End: 2019-07-04 | Stop reason: SDUPTHER

## 2019-04-16 NOTE — PATIENT INSTRUCTIONS
Please start monitoring your blood pressure several times a week:    HOME BLOOD PRESSURE MONITORING GOALS    Systolic (top) number less than 140                                                                    Diastolic (bottom) number less than 90  In 8 out of every 10 readings.   Call if not in this range so meds can be adjusted
home

## 2019-07-01 ENCOUNTER — HOSPITAL ENCOUNTER (EMERGENCY)
Age: 79
Discharge: HOME | End: 2019-07-01
Attending: EMERGENCY MEDICINE
Payer: MEDICARE

## 2019-07-01 ENCOUNTER — APPOINTMENT (OUTPATIENT)
Dept: CT IMAGING | Age: 79
End: 2019-07-01
Payer: MEDICARE

## 2019-07-01 VITALS
TEMPERATURE: 98 F | OXYGEN SATURATION: 97 % | WEIGHT: 200 LBS | HEART RATE: 74 BPM | SYSTOLIC BLOOD PRESSURE: 137 MMHG | DIASTOLIC BLOOD PRESSURE: 56 MMHG | RESPIRATION RATE: 16 BRPM | BODY MASS INDEX: 37.79 KG/M2

## 2019-07-01 DIAGNOSIS — R11.2 NON-INTRACTABLE VOMITING WITH NAUSEA, UNSPECIFIED VOMITING TYPE: Primary | ICD-10-CM

## 2019-07-01 DIAGNOSIS — R10.13 ABDOMINAL PAIN, EPIGASTRIC: ICD-10-CM

## 2019-07-01 LAB
A/G RATIO: 1.3 (ref 1.1–2.2)
ALBUMIN SERPL-MCNC: 4.3 G/DL (ref 3.4–5)
ALP BLD-CCNC: 74 U/L (ref 40–129)
ALT SERPL-CCNC: 12 U/L (ref 10–40)
ANION GAP SERPL CALCULATED.3IONS-SCNC: 15 MMOL/L (ref 3–16)
AST SERPL-CCNC: 17 U/L (ref 15–37)
BASOPHILS ABSOLUTE: 0.1 K/UL (ref 0–0.2)
BASOPHILS RELATIVE PERCENT: 0.7 %
BILIRUB SERPL-MCNC: 0.3 MG/DL (ref 0–1)
BILIRUBIN URINE: NEGATIVE
BLOOD, URINE: NEGATIVE
BUN BLDV-MCNC: 19 MG/DL (ref 7–20)
CALCIUM SERPL-MCNC: 9 MG/DL (ref 8.3–10.6)
CASTS 2: ABNORMAL /LPF
CASTS: ABNORMAL /LPF
CHLORIDE BLD-SCNC: 96 MMOL/L (ref 99–110)
CLARITY: CLEAR
CO2: 25 MMOL/L (ref 21–32)
COLOR: YELLOW
CREAT SERPL-MCNC: 1.3 MG/DL (ref 0.6–1.2)
EOSINOPHILS ABSOLUTE: 0.1 K/UL (ref 0–0.6)
EOSINOPHILS RELATIVE PERCENT: 1 %
EPITHELIAL CELLS, UA: ABNORMAL /HPF
GFR AFRICAN AMERICAN: 48
GFR NON-AFRICAN AMERICAN: 39
GLOBULIN: 3.4 G/DL
GLUCOSE BLD-MCNC: 152 MG/DL (ref 70–99)
GLUCOSE URINE: NEGATIVE MG/DL
HCT VFR BLD CALC: 35.7 % (ref 36–48)
HEMOGLOBIN: 11.8 G/DL (ref 12–16)
KETONES, URINE: NEGATIVE MG/DL
LACTIC ACID: 2.3 MMOL/L (ref 0.4–2)
LEUKOCYTE ESTERASE, URINE: ABNORMAL
LIPASE: 27 U/L (ref 13–60)
LYMPHOCYTES ABSOLUTE: 1.3 K/UL (ref 1–5.1)
LYMPHOCYTES RELATIVE PERCENT: 14.7 %
MCH RBC QN AUTO: 27.8 PG (ref 26–34)
MCHC RBC AUTO-ENTMCNC: 33.1 G/DL (ref 31–36)
MCV RBC AUTO: 84.1 FL (ref 80–100)
MICROSCOPIC EXAMINATION: YES
MONOCYTES ABSOLUTE: 0.8 K/UL (ref 0–1.3)
MONOCYTES RELATIVE PERCENT: 9.3 %
NEUTROPHILS ABSOLUTE: 6.5 K/UL (ref 1.7–7.7)
NEUTROPHILS RELATIVE PERCENT: 74.3 %
NITRITE, URINE: NEGATIVE
PDW BLD-RTO: 15.4 % (ref 12.4–15.4)
PH UA: 5.5 (ref 5–8)
PLATELET # BLD: 377 K/UL (ref 135–450)
PMV BLD AUTO: 6.7 FL (ref 5–10.5)
POTASSIUM SERPL-SCNC: 4.3 MMOL/L (ref 3.5–5.1)
PROTEIN UA: NEGATIVE MG/DL
RBC # BLD: 4.25 M/UL (ref 4–5.2)
RBC UA: ABNORMAL /HPF (ref 0–2)
SODIUM BLD-SCNC: 136 MMOL/L (ref 136–145)
SPECIFIC GRAVITY UA: 1.01 (ref 1–1.03)
TOTAL PROTEIN: 7.7 G/DL (ref 6.4–8.2)
URINE TYPE: ABNORMAL
UROBILINOGEN, URINE: 0.2 E.U./DL
WBC # BLD: 8.7 K/UL (ref 4–11)
WBC UA: ABNORMAL /HPF (ref 0–5)

## 2019-07-01 PROCEDURE — 80053 COMPREHEN METABOLIC PANEL: CPT

## 2019-07-01 PROCEDURE — 6370000000 HC RX 637 (ALT 250 FOR IP): Performed by: EMERGENCY MEDICINE

## 2019-07-01 PROCEDURE — 2580000003 HC RX 258: Performed by: EMERGENCY MEDICINE

## 2019-07-01 PROCEDURE — 96374 THER/PROPH/DIAG INJ IV PUSH: CPT

## 2019-07-01 PROCEDURE — 85025 COMPLETE CBC W/AUTO DIFF WBC: CPT

## 2019-07-01 PROCEDURE — 99284 EMERGENCY DEPT VISIT MOD MDM: CPT

## 2019-07-01 PROCEDURE — 83605 ASSAY OF LACTIC ACID: CPT

## 2019-07-01 PROCEDURE — 81001 URINALYSIS AUTO W/SCOPE: CPT

## 2019-07-01 PROCEDURE — 96361 HYDRATE IV INFUSION ADD-ON: CPT

## 2019-07-01 PROCEDURE — 6360000002 HC RX W HCPCS: Performed by: EMERGENCY MEDICINE

## 2019-07-01 PROCEDURE — 96375 TX/PRO/DX INJ NEW DRUG ADDON: CPT

## 2019-07-01 PROCEDURE — 2500000003 HC RX 250 WO HCPCS: Performed by: EMERGENCY MEDICINE

## 2019-07-01 PROCEDURE — 74176 CT ABD & PELVIS W/O CONTRAST: CPT

## 2019-07-01 PROCEDURE — 83690 ASSAY OF LIPASE: CPT

## 2019-07-01 RX ORDER — ONDANSETRON 2 MG/ML
4 INJECTION INTRAMUSCULAR; INTRAVENOUS EVERY 6 HOURS PRN
Status: DISCONTINUED | OUTPATIENT
Start: 2019-07-01 | End: 2019-07-01 | Stop reason: HOSPADM

## 2019-07-01 RX ORDER — OMEPRAZOLE 20 MG/1
20 CAPSULE, DELAYED RELEASE ORAL
Qty: 28 CAPSULE | Refills: 0 | Status: ON HOLD | OUTPATIENT
Start: 2019-07-01 | End: 2019-07-05 | Stop reason: HOSPADM

## 2019-07-01 RX ORDER — MORPHINE SULFATE 4 MG/ML
2 INJECTION, SOLUTION INTRAMUSCULAR; INTRAVENOUS ONCE
Status: COMPLETED | OUTPATIENT
Start: 2019-07-01 | End: 2019-07-01

## 2019-07-01 RX ORDER — 0.9 % SODIUM CHLORIDE 0.9 %
1000 INTRAVENOUS SOLUTION INTRAVENOUS ONCE
Status: COMPLETED | OUTPATIENT
Start: 2019-07-01 | End: 2019-07-01

## 2019-07-01 RX ADMIN — MORPHINE SULFATE 2 MG: 4 INJECTION INTRAVENOUS at 12:48

## 2019-07-01 RX ADMIN — ONDANSETRON 4 MG: 2 INJECTION INTRAMUSCULAR; INTRAVENOUS at 12:46

## 2019-07-01 RX ADMIN — SODIUM CHLORIDE 1000 ML: 9 INJECTION, SOLUTION INTRAVENOUS at 12:46

## 2019-07-01 RX ADMIN — LIDOCAINE HYDROCHLORIDE: 20 SOLUTION ORAL; TOPICAL at 14:54

## 2019-07-01 RX ADMIN — FAMOTIDINE 20 MG: 10 INJECTION, SOLUTION INTRAVENOUS at 14:54

## 2019-07-01 ASSESSMENT — ENCOUNTER SYMPTOMS
TROUBLE SWALLOWING: 0
COUGH: 0
DIARRHEA: 1
CHEST TIGHTNESS: 0
SORE THROAT: 0
WHEEZING: 0
RHINORRHEA: 0
ABDOMINAL PAIN: 1
VOMITING: 1
NAUSEA: 1
STRIDOR: 0
SHORTNESS OF BREATH: 0

## 2019-07-01 ASSESSMENT — PAIN SCALES - GENERAL
PAINLEVEL_OUTOF10: 5
PAINLEVEL_OUTOF10: 5

## 2019-07-01 ASSESSMENT — PAIN DESCRIPTION - LOCATION: LOCATION: ABDOMEN

## 2019-07-01 NOTE — ED NOTES
Dr Bernardo Rogers [facility]  07/01/19 0610

## 2019-07-01 NOTE — ED PROVIDER NOTES
intraperitoneal free air or abscess. 3. Heterogeneous attenuation about the gallbladder suggests presence of   underlying gallstones/sludge, as described above.                ED BEDSIDE ULTRASOUND:   Performed by ED Physician - none    LABS:  Labs Reviewed   CBC WITH AUTO DIFFERENTIAL - Abnormal; Notable for the following components:       Result Value    Hemoglobin 11.8 (*)     Hematocrit 35.7 (*)     All other components within normal limits    Narrative:     Performed at:  [facility],  [place], [place]   Phone [phone]   COMPREHENSIVE METABOLIC PANEL - Abnormal; Notable for the following components:    Chloride 96 (*)     Glucose 152 (*)     CREATININE 1.3 (*)     GFR Non- 39 (*)     GFR  48 (*)     All other components within normal limits    Narrative:     Performed at:  [facility]) - [facility],  [place], [facility] [place], [place] [employer]   Phone [phone]   LACTIC ACID, PLASMA - Abnormal; Notable for the following components:    Lactic Acid 2.3 (*)     All other components within normal limits    Narrative:     Performed at:  [facility]) [facility],  [place], [facility] [place], [place] [employer]   Phone [phone]   URINALYSIS - Abnormal; Notable for the following components:    Leukocyte Esterase, Urine TRACE (*)     All other components within normal limits    Narrative:     Performed at:  [facility]) [facility],  [place], [place]   Phone [phone]   MICROSCOPIC URINALYSIS - Abnormal; Notable for the following components:    Casts 5-10 Hyaline (*)     CASTS 2 0-1 Coarse Rhonda Eagles (*)     All other components within normal limits    Narrative:     Performed at:  [facility]) [facility],  [place] [place] [employer] [place]   Phone [phone]   LIPASE    Narrative:     Performed at:  [address] [place] [address]

## 2019-07-04 DIAGNOSIS — E11.8 TYPE 2 DIABETES MELLITUS WITH COMPLICATION (HCC): ICD-10-CM

## 2019-07-05 ENCOUNTER — HOSPITAL ENCOUNTER (OUTPATIENT)
Age: 79
Setting detail: OUTPATIENT SURGERY
Discharge: HOME | End: 2019-07-05
Attending: INTERNAL MEDICINE | Admitting: INTERNAL MEDICINE
Payer: MEDICARE

## 2019-07-05 VITALS
WEIGHT: 200 LBS | TEMPERATURE: 96.5 F | HEART RATE: 54 BPM | SYSTOLIC BLOOD PRESSURE: 114 MMHG | HEIGHT: 62 IN | DIASTOLIC BLOOD PRESSURE: 57 MMHG | OXYGEN SATURATION: 97 % | RESPIRATION RATE: 16 BRPM | BODY MASS INDEX: 36.8 KG/M2

## 2019-07-05 LAB
GLUCOSE BLD-MCNC: 127 MG/DL (ref 70–99)
PERFORMED ON: ABNORMAL

## 2019-07-05 PROCEDURE — 6360000002 HC RX W HCPCS: Performed by: INTERNAL MEDICINE

## 2019-07-05 PROCEDURE — 2709999900 HC NON-CHARGEABLE SUPPLY: Performed by: INTERNAL MEDICINE

## 2019-07-05 PROCEDURE — 7100000010 HC PHASE II RECOVERY - FIRST 15 MIN: Performed by: INTERNAL MEDICINE

## 2019-07-05 PROCEDURE — 99152 MOD SED SAME PHYS/QHP 5/>YRS: CPT | Performed by: INTERNAL MEDICINE

## 2019-07-05 PROCEDURE — 3609012400 HC EGD TRANSORAL BIOPSY SINGLE/MULTIPLE: Performed by: INTERNAL MEDICINE

## 2019-07-05 PROCEDURE — 88305 TISSUE EXAM BY PATHOLOGIST: CPT

## 2019-07-05 PROCEDURE — 7100000011 HC PHASE II RECOVERY - ADDTL 15 MIN: Performed by: INTERNAL MEDICINE

## 2019-07-05 PROCEDURE — 88342 IMHCHEM/IMCYTCHM 1ST ANTB: CPT

## 2019-07-05 RX ORDER — MIDAZOLAM HYDROCHLORIDE 5 MG/ML
INJECTION INTRAMUSCULAR; INTRAVENOUS PRN
Status: DISCONTINUED | OUTPATIENT
Start: 2019-07-05 | End: 2019-07-05 | Stop reason: ALTCHOICE

## 2019-07-05 RX ORDER — SODIUM CHLORIDE, SODIUM LACTATE, POTASSIUM CHLORIDE, CALCIUM CHLORIDE 600; 310; 30; 20 MG/100ML; MG/100ML; MG/100ML; MG/100ML
INJECTION, SOLUTION INTRAVENOUS CONTINUOUS
Status: DISCONTINUED | OUTPATIENT
Start: 2019-07-05 | End: 2019-07-05 | Stop reason: HOSPADM

## 2019-07-05 RX ORDER — OMEPRAZOLE 40 MG/1
40 CAPSULE, DELAYED RELEASE ORAL 2 TIMES DAILY
Qty: 120 CAPSULE | Refills: 0 | Status: SHIPPED | OUTPATIENT
Start: 2019-07-05 | End: 2019-07-08 | Stop reason: SDUPTHER

## 2019-07-05 RX ORDER — GLIPIZIDE 10 MG/1
TABLET ORAL
Qty: 180 TABLET | Refills: 3 | Status: SHIPPED | OUTPATIENT
Start: 2019-07-05 | End: 2020-08-31

## 2019-07-05 RX ORDER — FENTANYL CITRATE 50 UG/ML
INJECTION, SOLUTION INTRAMUSCULAR; INTRAVENOUS PRN
Status: DISCONTINUED | OUTPATIENT
Start: 2019-07-05 | End: 2019-07-05 | Stop reason: ALTCHOICE

## 2019-07-08 ENCOUNTER — OFFICE VISIT (OUTPATIENT)
Dept: FAMILY MEDICINE CLINIC | Age: 79
End: 2019-07-08
Payer: MEDICARE

## 2019-07-08 VITALS
HEIGHT: 61 IN | DIASTOLIC BLOOD PRESSURE: 60 MMHG | HEART RATE: 64 BPM | OXYGEN SATURATION: 95 % | RESPIRATION RATE: 16 BRPM | BODY MASS INDEX: 37.18 KG/M2 | SYSTOLIC BLOOD PRESSURE: 114 MMHG

## 2019-07-08 DIAGNOSIS — E78.2 MIXED HYPERLIPIDEMIA: ICD-10-CM

## 2019-07-08 DIAGNOSIS — K26.9 DUODENAL ULCER: ICD-10-CM

## 2019-07-08 DIAGNOSIS — M15.9 PRIMARY OSTEOARTHRITIS INVOLVING MULTIPLE JOINTS: ICD-10-CM

## 2019-07-08 DIAGNOSIS — I87.2 VENOUS INSUFFICIENCY: ICD-10-CM

## 2019-07-08 DIAGNOSIS — E11.8 TYPE 2 DIABETES MELLITUS WITH COMPLICATION, UNSPECIFIED WHETHER LONG TERM INSULIN USE: Primary | ICD-10-CM

## 2019-07-08 DIAGNOSIS — E11.51 DM (DIABETES MELLITUS), TYPE 2 WITH PERIPHERAL VASCULAR COMPLICATIONS (HCC): ICD-10-CM

## 2019-07-08 DIAGNOSIS — D64.9 ANEMIA, UNSPECIFIED TYPE: ICD-10-CM

## 2019-07-08 LAB
BASOPHILS ABSOLUTE: 0.1 K/UL (ref 0–0.2)
BASOPHILS RELATIVE PERCENT: 0.8 %
EOSINOPHILS ABSOLUTE: 0.2 K/UL (ref 0–0.6)
EOSINOPHILS RELATIVE PERCENT: 2.3 %
HBA1C MFR BLD: 6.4 %
HCT VFR BLD CALC: 33.5 % (ref 36–48)
HEMOGLOBIN: 10.9 G/DL (ref 12–16)
LYMPHOCYTES ABSOLUTE: 1.5 K/UL (ref 1–5.1)
LYMPHOCYTES RELATIVE PERCENT: 17 %
MCH RBC QN AUTO: 28.4 PG (ref 26–34)
MCHC RBC AUTO-ENTMCNC: 32.4 G/DL (ref 31–36)
MCV RBC AUTO: 87.4 FL (ref 80–100)
MONOCYTES ABSOLUTE: 1 K/UL (ref 0–1.3)
MONOCYTES RELATIVE PERCENT: 10.9 %
NEUTROPHILS ABSOLUTE: 6.1 K/UL (ref 1.7–7.7)
NEUTROPHILS RELATIVE PERCENT: 69 %
PDW BLD-RTO: 15.4 % (ref 12.4–15.4)
PLATELET # BLD: 359 K/UL (ref 135–450)
PMV BLD AUTO: 8.4 FL (ref 5–10.5)
RBC # BLD: 3.83 M/UL (ref 4–5.2)
WBC # BLD: 8.8 K/UL (ref 4–11)

## 2019-07-08 PROCEDURE — G8598 ASA/ANTIPLAT THER USED: HCPCS | Performed by: FAMILY MEDICINE

## 2019-07-08 PROCEDURE — 1123F ACP DISCUSS/DSCN MKR DOCD: CPT | Performed by: FAMILY MEDICINE

## 2019-07-08 PROCEDURE — G8427 DOCREV CUR MEDS BY ELIG CLIN: HCPCS | Performed by: FAMILY MEDICINE

## 2019-07-08 PROCEDURE — G8417 CALC BMI ABV UP PARAM F/U: HCPCS | Performed by: FAMILY MEDICINE

## 2019-07-08 PROCEDURE — G8400 PT W/DXA NO RESULTS DOC: HCPCS | Performed by: FAMILY MEDICINE

## 2019-07-08 PROCEDURE — 1036F TOBACCO NON-USER: CPT | Performed by: FAMILY MEDICINE

## 2019-07-08 PROCEDURE — 83036 HEMOGLOBIN GLYCOSYLATED A1C: CPT | Performed by: FAMILY MEDICINE

## 2019-07-08 PROCEDURE — 4040F PNEUMOC VAC/ADMIN/RCVD: CPT | Performed by: FAMILY MEDICINE

## 2019-07-08 PROCEDURE — 1090F PRES/ABSN URINE INCON ASSESS: CPT | Performed by: FAMILY MEDICINE

## 2019-07-08 PROCEDURE — 99214 OFFICE O/P EST MOD 30 MIN: CPT | Performed by: FAMILY MEDICINE

## 2019-07-08 PROCEDURE — 36415 COLL VENOUS BLD VENIPUNCTURE: CPT | Performed by: FAMILY MEDICINE

## 2019-07-08 RX ORDER — METOPROLOL TARTRATE 50 MG/1
75 TABLET, FILM COATED ORAL DAILY
COMMUNITY
Start: 2019-06-02 | End: 2020-11-03 | Stop reason: CLARIF

## 2019-07-08 RX ORDER — OMEPRAZOLE 40 MG/1
40 CAPSULE, DELAYED RELEASE ORAL 2 TIMES DAILY
Qty: 120 CAPSULE | Refills: 1 | Status: SHIPPED | OUTPATIENT
Start: 2019-07-08 | End: 2019-10-01

## 2019-07-08 NOTE — PROGRESS NOTES
Take by mouth Yes Historical Provider, MD   metFORMIN (GLUCOPHAGE) 500 MG tablet TAKE TWO TABLETS BY MOUTH TWICE A DAY Yes SHORTY Monreal CNP   glipiZIDE (GLUCOTROL) 10 MG tablet TAKE ONE TABLET BY MOUTH TWICE A DAY Yes Robert Bell MD   omeprazole (PRILOSEC) 40 MG delayed release capsule Take 1 capsule by mouth 2 times daily  Patient taking differently: Take 40 mg by mouth 2 times daily Indications: pt states she is taking 80mg twice daily  Yes Sherrie Alvarez DO   pioglitazone (ACTOS) 45 MG tablet TAKE ONE TABLET BY MOUTH DAILY Yes SHORTY Monreal CNP   amLODIPine (NORVASC) 5 MG tablet Take 1 tablet by mouth every 12 hours Yes Robert Bell MD   losartan-hydrochlorothiazide (HYZAAR) 50-12.5 MG per tablet Sy Bauer Yes Robert Bell MD   aspirin 81 MG tablet Take 81 mg by mouth daily Yes Historical Provider, MD   vitamin B-12 (CYANOCOBALAMIN) 500 MCG tablet Take 500 mcg by mouth daily. Yes Historical Provider, MD   metoprolol tartrate (LOPRESSOR) 25 MG tablet Take 75 mg by mouth 2 times daily  Historical Provider, MD     Allergies   Allergen Reactions    Other Other (See Comments)     PURACOL AG dressing - burning       OBJECTIVE:  Estimated body mass index is 37.18 kg/m² as calculated from the following:    Height as of this encounter: 5' 1.5\" (1.562 m). Weight as of 7/5/19: 200 lb (90.7 kg). Vitals:    07/08/19 0859   BP: 114/60   Site: Left Upper Arm   Position: Sitting   Cuff Size: Large Adult   Pulse: 64   Resp: 16   SpO2: 95%   Height: 5' 1.5\" (1.562 m)     BP Readings from Last 2 Encounters:   07/08/19 114/60   07/05/19 (!) 114/57     Wt Readings from Last 3 Encounters:   07/05/19 200 lb (90.7 kg)   07/01/19 200 lb (90.7 kg)   10/13/18 200 lb (90.7 kg)       Physical Exam   Constitutional: She appears well-developed and well-nourished. No distress. HENT:   Head: Normocephalic and atraumatic.    Right Ear: 3 months. Patient should call the office immediately with new or ongoing signs or symptoms or worsening, or proceed to the emergency room. No changes in past medical history, past surgical history, social history, or family history were noted during the patient encounter unless specifically listed above. All updates of past medical history, past surgical history, social history, or family history were reviewed personally by me during the office visit. All problems listed in the assessment are stable unless noted otherwise. Medication profile reviewed personally by me during the office visit. Medication side effects and possible impairments from medications were discussed as applicable. This document was prepared by a combination of typing and transcription through a voice recognition software. Scribe attestation: Елена Ochoa RN, am scribing for and in the presence of Migel Nova MD. Electronically signed by Ziggy Arboleda RN on 7/8/2019 at 9:54 AM      Provider attestation:     I, Dr. Cordelia Snellen, personally performed the services described in this documentation, as scribed by the above signed scribe in my presence, and it is both accurate and complete. I agree with the ROS and Past Histories independently gathered by the clinical support staff and the remaining scribed note accurately describes my personal service to the patient.       7/8/2019    10:36 AM

## 2019-09-13 ENCOUNTER — TELEPHONE (OUTPATIENT)
Dept: FAMILY MEDICINE CLINIC | Age: 79
End: 2019-09-13

## 2019-09-13 ENCOUNTER — CARE COORDINATION (OUTPATIENT)
Dept: CARE COORDINATION | Age: 79
End: 2019-09-13

## 2019-09-13 RX ORDER — HYDROCHLOROTHIAZIDE 12.5 MG/1
12.5 CAPSULE, GELATIN COATED ORAL EVERY MORNING
Qty: 90 CAPSULE | Refills: 1 | Status: SHIPPED | OUTPATIENT
Start: 2019-09-13 | End: 2019-10-01

## 2019-09-13 RX ORDER — LOSARTAN POTASSIUM 50 MG/1
50 TABLET ORAL DAILY
Qty: 90 TABLET | Refills: 1 | Status: SHIPPED | OUTPATIENT
Start: 2019-09-13 | End: 2019-10-01

## 2019-09-26 ENCOUNTER — HOSPITAL ENCOUNTER (OUTPATIENT)
Age: 79
Discharge: HOME | End: 2019-09-26
Payer: MEDICARE

## 2019-09-26 PROCEDURE — 87338 HPYLORI STOOL AG IA: CPT

## 2019-09-29 LAB — H PYLORI ANTIGEN STOOL: NEGATIVE

## 2019-10-01 ENCOUNTER — TELEPHONE (OUTPATIENT)
Dept: FAMILY MEDICINE CLINIC | Age: 79
End: 2019-10-01

## 2019-10-14 ENCOUNTER — OFFICE VISIT (OUTPATIENT)
Dept: FAMILY MEDICINE CLINIC | Age: 79
End: 2019-10-14
Payer: MEDICARE

## 2019-10-14 VITALS — DIASTOLIC BLOOD PRESSURE: 62 MMHG | SYSTOLIC BLOOD PRESSURE: 138 MMHG | OXYGEN SATURATION: 96 % | HEART RATE: 68 BPM

## 2019-10-14 DIAGNOSIS — M15.9 PRIMARY OSTEOARTHRITIS INVOLVING MULTIPLE JOINTS: ICD-10-CM

## 2019-10-14 DIAGNOSIS — I87.2 VENOUS INSUFFICIENCY: ICD-10-CM

## 2019-10-14 DIAGNOSIS — K26.9 DUODENAL ULCER: ICD-10-CM

## 2019-10-14 DIAGNOSIS — E78.2 MIXED HYPERLIPIDEMIA: ICD-10-CM

## 2019-10-14 DIAGNOSIS — I10 BENIGN ESSENTIAL HTN: ICD-10-CM

## 2019-10-14 DIAGNOSIS — E11.51 DM (DIABETES MELLITUS), TYPE 2 WITH PERIPHERAL VASCULAR COMPLICATIONS (HCC): Primary | ICD-10-CM

## 2019-10-14 PROCEDURE — 99214 OFFICE O/P EST MOD 30 MIN: CPT | Performed by: FAMILY MEDICINE

## 2019-10-14 PROCEDURE — 1123F ACP DISCUSS/DSCN MKR DOCD: CPT | Performed by: FAMILY MEDICINE

## 2019-10-14 PROCEDURE — 1090F PRES/ABSN URINE INCON ASSESS: CPT | Performed by: FAMILY MEDICINE

## 2019-10-14 PROCEDURE — G8400 PT W/DXA NO RESULTS DOC: HCPCS | Performed by: FAMILY MEDICINE

## 2019-10-14 PROCEDURE — G8417 CALC BMI ABV UP PARAM F/U: HCPCS | Performed by: FAMILY MEDICINE

## 2019-10-14 PROCEDURE — G8484 FLU IMMUNIZE NO ADMIN: HCPCS | Performed by: FAMILY MEDICINE

## 2019-10-14 PROCEDURE — G8427 DOCREV CUR MEDS BY ELIG CLIN: HCPCS | Performed by: FAMILY MEDICINE

## 2019-10-14 PROCEDURE — 1036F TOBACCO NON-USER: CPT | Performed by: FAMILY MEDICINE

## 2019-10-14 PROCEDURE — G8598 ASA/ANTIPLAT THER USED: HCPCS | Performed by: FAMILY MEDICINE

## 2019-10-14 PROCEDURE — 4040F PNEUMOC VAC/ADMIN/RCVD: CPT | Performed by: FAMILY MEDICINE

## 2019-10-14 PROCEDURE — 36415 COLL VENOUS BLD VENIPUNCTURE: CPT | Performed by: FAMILY MEDICINE

## 2019-10-15 LAB
ANION GAP SERPL CALCULATED.3IONS-SCNC: 17 MMOL/L (ref 3–16)
BUN BLDV-MCNC: 28 MG/DL (ref 7–20)
CALCIUM SERPL-MCNC: 9.4 MG/DL (ref 8.3–10.6)
CHLORIDE BLD-SCNC: 101 MMOL/L (ref 99–110)
CO2: 23 MMOL/L (ref 21–32)
CREAT SERPL-MCNC: 1.2 MG/DL (ref 0.6–1.2)
ESTIMATED AVERAGE GLUCOSE: 151.3 MG/DL
GFR AFRICAN AMERICAN: 52
GFR NON-AFRICAN AMERICAN: 43
GLUCOSE BLD-MCNC: 112 MG/DL (ref 70–99)
HBA1C MFR BLD: 6.9 %
HCT VFR BLD CALC: 32.5 % (ref 36–48)
HEMOGLOBIN: 10.3 G/DL (ref 12–16)
MCH RBC QN AUTO: 26.2 PG (ref 26–34)
MCHC RBC AUTO-ENTMCNC: 31.7 G/DL (ref 31–36)
MCV RBC AUTO: 82.8 FL (ref 80–100)
PDW BLD-RTO: 17.5 % (ref 12.4–15.4)
PLATELET # BLD: 358 K/UL (ref 135–450)
PMV BLD AUTO: 8.1 FL (ref 5–10.5)
POTASSIUM SERPL-SCNC: 4.6 MMOL/L (ref 3.5–5.1)
RBC # BLD: 3.92 M/UL (ref 4–5.2)
SODIUM BLD-SCNC: 141 MMOL/L (ref 136–145)
WBC # BLD: 6.5 K/UL (ref 4–11)

## 2020-01-27 RX ORDER — AMLODIPINE BESYLATE 5 MG/1
TABLET ORAL
Qty: 180 TABLET | Refills: 3 | Status: SHIPPED | OUTPATIENT
Start: 2020-01-27 | End: 2021-02-01

## 2020-02-28 RX ORDER — METOPROLOL TARTRATE 50 MG/1
TABLET, FILM COATED ORAL
Qty: 270 TABLET | Refills: 3 | Status: SHIPPED | OUTPATIENT
Start: 2020-02-28 | End: 2021-04-05

## 2020-04-21 ENCOUNTER — TELEPHONE (OUTPATIENT)
Dept: PRIMARY CARE CLINIC | Age: 80
End: 2020-04-21

## 2020-04-28 ENCOUNTER — VIRTUAL VISIT (OUTPATIENT)
Dept: FAMILY MEDICINE CLINIC | Age: 80
End: 2020-04-28
Payer: MEDICARE

## 2020-04-28 VITALS
SYSTOLIC BLOOD PRESSURE: 147 MMHG | DIASTOLIC BLOOD PRESSURE: 64 MMHG | WEIGHT: 199 LBS | BODY MASS INDEX: 37 KG/M2 | HEART RATE: 67 BPM

## 2020-04-28 PROCEDURE — G2012 BRIEF CHECK IN BY MD/QHP: HCPCS | Performed by: FAMILY MEDICINE

## 2020-04-28 RX ORDER — HYDROCHLOROTHIAZIDE 12.5 MG/1
12.5 CAPSULE, GELATIN COATED ORAL DAILY
COMMUNITY
Start: 2020-04-22 | End: 2020-04-28 | Stop reason: DRUGHIGH

## 2020-04-28 RX ORDER — HYDROCHLOROTHIAZIDE 25 MG/1
25 TABLET ORAL EVERY MORNING
Qty: 90 TABLET | Refills: 1 | Status: SHIPPED | OUTPATIENT
Start: 2020-04-28 | End: 2020-11-16

## 2020-04-28 RX ORDER — LOSARTAN POTASSIUM 50 MG/1
50 TABLET ORAL DAILY
COMMUNITY
Start: 2020-04-23 | End: 2020-04-28 | Stop reason: SDUPTHER

## 2020-04-28 RX ORDER — CELECOXIB 100 MG/1
100 CAPSULE ORAL DAILY
Qty: 30 CAPSULE | Refills: 3 | Status: SHIPPED | OUTPATIENT
Start: 2020-04-28 | End: 2021-11-12 | Stop reason: ALTCHOICE

## 2020-04-28 RX ORDER — LOSARTAN POTASSIUM 100 MG/1
100 TABLET ORAL DAILY
Qty: 90 TABLET | Refills: 3 | Status: SHIPPED | OUTPATIENT
Start: 2020-04-28 | End: 2021-04-20 | Stop reason: SDUPTHER

## 2020-04-28 NOTE — PROGRESS NOTES
Emperatriz Montalov is a 79 y.o. female evaluated via telephone on 4/28/2020. The losartan/HCTZ was changed due to availability as separate RX;s but it was changed to just 1 daily and she was always on 2 daily, I have pended it for the higher strength of both at 1 daily. Consent:  She and/or health care decision maker is aware that that she may receive a bill for this telephone service, depending on her insurance coverage, and has provided verbal consent to proceed: Yes      Documentation:  I communicated with the patient and/or health care decision maker about   Treatment Adherence:   Medication compliance:  compliant all of the time  Diet compliance:  compliant most of the time  Weight trend: stable  Current exercise: working  Barriers: none    Diabetes Mellitus Type 2: Current symptoms/problems include none. Home blood sugar records: fasting range: 117 test daily prn  Any episodes of hypoglycemia? no  Eye exam current (within one year): no  Tobacco history: She  reports that she has never smoked. She has never used smokeless tobacco.   Daily Aspirin? NO, patient choice    Hypertension:  Home blood pressure monitoring: Yes - runs OK. She is not adherent to a low sodium diet. Patient denies chest pain and shortness of breath. Antihypertensive medication side effects: no medication side effects noted. Use of agents associated with hypertension: none. Hyperlipidemia:  No new myalgias or GI upset on no med, patient has refused treatment. .       Lab Results   Component Value Date    LABA1C 6.9 10/14/2019    LABA1C 6.4 07/08/2019    LABA1C 6.8 03/19/2019     Lab Results   Component Value Date    LABMICR YES 07/01/2019    CREATININE 1.2 10/14/2019     Lab Results   Component Value Date    ALT 12 07/01/2019    AST 17 07/01/2019     Lab Results   Component Value Date    HDL 43 03/23/2018    LDLCALC 147 (H) 03/23/2018      Patient's biggest issue is her ankle that was damage in the past.  She still has a lot of pain.  She stopped aspirin and Celebrex due to a duodenal ulcer. She states she never had the ulcer until aspirin was started. She was frustrated that she was unable to no longer takes Celebrex so she actually stopped the aspirin as well. She realizes now the Celebrex helped her ankle more than she realized and wishes to try to do something. I have advised that we try topical NSAID for 3 weeks. I still wonder about the possibility of GI bleeding although less than oral.  If in 3 weeks the topical NSAID is not helping, she will contact us and we can entertain restarting Celebrex at 100 mg daily rather than 200 mg daily as she was using it. 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 Diagnoses and all orders for this visit:    Duodenal ulcer    DM (diabetes mellitus), type 2 with peripheral vascular complications (HCC)    Primary osteoarthritis involving multiple joints    Benign essential HTN    Mixed hyperlipidemia    Other orders  -     losartan (COZAAR) 100 MG tablet; Take 1 tablet by mouth daily  -     hydroCHLOROthiazide (HYDRODIURIL) 25 MG tablet; Take 1 tablet by mouth every morning  -     diclofenac sodium (VOLTAREN) 1 % GEL; Apply 4 g topically 4 times daily      No further bleeding. Other discussion which could affect ulcer as above. Blood pressure acceptable. She only tests her sugar periodically, no symptoms of high or low sugar. Follow-up labs June follow-up office visit 6 months. Lipids will be monitored on schedule.   Details of this discussion including any medical advice provided: see above      I affirm this is a Patient Initiated Episode with an Established Patient who has not had a related appointment within my department in the past 7 days or

## 2020-04-28 NOTE — TELEPHONE ENCOUNTER
Please notify the patient and see if she now wishes to try Celebrex 100 mg daily, being aware of the risk of taking this medicine with a history of ulcer

## 2020-08-31 RX ORDER — PIOGLITAZONEHYDROCHLORIDE 45 MG/1
TABLET ORAL
Qty: 90 TABLET | Refills: 3 | Status: SHIPPED | OUTPATIENT
Start: 2020-08-31 | End: 2021-04-20 | Stop reason: SDUPTHER

## 2020-08-31 RX ORDER — GLIPIZIDE 10 MG/1
TABLET ORAL
Qty: 180 TABLET | Refills: 2 | Status: SHIPPED | OUTPATIENT
Start: 2020-08-31 | End: 2021-04-20 | Stop reason: SDUPTHER

## 2020-08-31 NOTE — TELEPHONE ENCOUNTER
1.  A. fib rate is well controlled, continue the same dose of metoprolol.  2.  Continue long-term anticoagulation with Eliquis 2.5 mg twice daily.   Is seen every 6 months and has a Nov appt fever/Malaise.

## 2020-11-03 ENCOUNTER — OFFICE VISIT (OUTPATIENT)
Dept: FAMILY MEDICINE CLINIC | Age: 80
End: 2020-11-03
Payer: MEDICARE

## 2020-11-03 VITALS
SYSTOLIC BLOOD PRESSURE: 154 MMHG | DIASTOLIC BLOOD PRESSURE: 72 MMHG | HEART RATE: 73 BPM | TEMPERATURE: 97.6 F | OXYGEN SATURATION: 99 %

## 2020-11-03 PROBLEM — I10 BENIGN ESSENTIAL HTN: Status: RESOLVED | Noted: 2020-11-03 | Resolved: 2020-11-03

## 2020-11-03 PROBLEM — E66.01 MORBIDLY OBESE (HCC): Status: ACTIVE | Noted: 2020-11-03

## 2020-11-03 LAB
A/G RATIO: 1.5 (ref 1.1–2.2)
ALBUMIN SERPL-MCNC: 4.6 G/DL (ref 3.4–5)
ALP BLD-CCNC: 80 U/L (ref 40–129)
ALT SERPL-CCNC: 13 U/L (ref 10–40)
ANION GAP SERPL CALCULATED.3IONS-SCNC: 13 MMOL/L (ref 3–16)
AST SERPL-CCNC: 15 U/L (ref 15–37)
BILIRUB SERPL-MCNC: 0.3 MG/DL (ref 0–1)
BUN BLDV-MCNC: 29 MG/DL (ref 7–20)
CALCIUM SERPL-MCNC: 9.7 MG/DL (ref 8.3–10.6)
CHLORIDE BLD-SCNC: 103 MMOL/L (ref 99–110)
CHOLESTEROL, TOTAL: 260 MG/DL (ref 0–199)
CO2: 26 MMOL/L (ref 21–32)
CREAT SERPL-MCNC: 1.2 MG/DL (ref 0.6–1.2)
CREATININE URINE: 37.3 MG/DL (ref 28–259)
GFR AFRICAN AMERICAN: 52
GFR NON-AFRICAN AMERICAN: 43
GLOBULIN: 3 G/DL
GLUCOSE BLD-MCNC: 97 MG/DL (ref 70–99)
HDLC SERPL-MCNC: 52 MG/DL (ref 40–60)
LDL CHOLESTEROL CALCULATED: 177 MG/DL
MICROALBUMIN UR-MCNC: <1.2 MG/DL
MICROALBUMIN/CREAT UR-RTO: NORMAL MG/G (ref 0–30)
POTASSIUM SERPL-SCNC: 5 MMOL/L (ref 3.5–5.1)
SODIUM BLD-SCNC: 142 MMOL/L (ref 136–145)
TOTAL PROTEIN: 7.6 G/DL (ref 6.4–8.2)
TRIGL SERPL-MCNC: 156 MG/DL (ref 0–150)
VLDLC SERPL CALC-MCNC: 31 MG/DL

## 2020-11-03 PROCEDURE — 4040F PNEUMOC VAC/ADMIN/RCVD: CPT | Performed by: FAMILY MEDICINE

## 2020-11-03 PROCEDURE — G8484 FLU IMMUNIZE NO ADMIN: HCPCS | Performed by: FAMILY MEDICINE

## 2020-11-03 PROCEDURE — 1090F PRES/ABSN URINE INCON ASSESS: CPT | Performed by: FAMILY MEDICINE

## 2020-11-03 PROCEDURE — G8427 DOCREV CUR MEDS BY ELIG CLIN: HCPCS | Performed by: FAMILY MEDICINE

## 2020-11-03 PROCEDURE — 99214 OFFICE O/P EST MOD 30 MIN: CPT | Performed by: FAMILY MEDICINE

## 2020-11-03 PROCEDURE — 36415 COLL VENOUS BLD VENIPUNCTURE: CPT | Performed by: FAMILY MEDICINE

## 2020-11-03 PROCEDURE — G8400 PT W/DXA NO RESULTS DOC: HCPCS | Performed by: FAMILY MEDICINE

## 2020-11-03 PROCEDURE — G8417 CALC BMI ABV UP PARAM F/U: HCPCS | Performed by: FAMILY MEDICINE

## 2020-11-03 PROCEDURE — 1036F TOBACCO NON-USER: CPT | Performed by: FAMILY MEDICINE

## 2020-11-03 PROCEDURE — 1123F ACP DISCUSS/DSCN MKR DOCD: CPT | Performed by: FAMILY MEDICINE

## 2020-11-03 RX ORDER — OMEPRAZOLE 20 MG/1
20 CAPSULE, DELAYED RELEASE ORAL
Qty: 90 CAPSULE | Refills: 5 | Status: SHIPPED | OUTPATIENT
Start: 2020-11-03 | End: 2021-04-20 | Stop reason: SDUPTHER

## 2020-11-03 NOTE — PROGRESS NOTES
Chief Complaint   Patient presents with    Hyperlipidemia    Hypertension    Diabetes    Other     multiple medical concerns     pts right ankle has still been bothering her. She does she a foot doctor for it and they stated they could do a cortisone injection but really no other treatment for it. Pt has not tried the cortisone injection yet. Pt also recently went off all of her acid reflux medications stated that her symptoms have been okay off the medications. Pt was prescribed a gel last visit for her ankle pain but she did not try it. Discussed the potential need for a medication to prevent another ulcer from developing since she is on the celebrex. HPI:  Radha Shepherd is a 79 y.o. (: 1940) here today for  Treatment Adherence:   Medication compliance:  compliant most of the time has been having issues with remembering to take her evening doses of medications  Diet compliance:  noncompliant: eats what she wants  Weight trend: unsure pt refused to get weight  Current exercise: no regular exercise, just walking at work  Barriers: R ankle has very bad arthritis    Diabetes Mellitus Type 2: Current symptoms/problems include none. Home blood sugar records: patient does not test  Any episodes of hypoglycemia? unsure  Eye exam current (within one year): no  Tobacco history: She  reports that she has never smoked. She has never used smokeless tobacco.   Daily Aspirin? No: pt is on celebrex and when she started on aspirin with it she developed a bleeding ulcer    Hypertension:  Home blood pressure monitoring: Yes - pretty regularly. She is not adherent to a low sodium diet. Patient denies chest pain, shortness of breath, headache, lightheadedness, blurred vision and palpitations. Antihypertensive medication side effects: no medication side effects noted. Use of agents associated with hypertension: none.      Hyperlipidemia: pt was on a statin years ago but when her  past she stopped taking it due to high price and not necessarily finding it important says if she dies she dies. Lab Results   Component Value Date    LABA1C 6.9 10/14/2019    LABA1C 6.4 07/08/2019    LABA1C 6.8 03/19/2019     Lab Results   Component Value Date    LABMICR YES 07/01/2019    CREATININE 1.2 10/14/2019     Lab Results   Component Value Date    ALT 12 07/01/2019    AST 17 07/01/2019     Lab Results   Component Value Date    HDL 43 03/23/2018    LDLCALC 147 (H) 03/23/2018          Patient's medications, allergies, past medical, surgical, social and family histories were reviewed and updated asappropriate on 11/3/2020 at 8:05 AM.    ROS:  Review of Systems    All other systems reviewed and are negative except as noted above on 11/3/2020 at 8:05 AM. Additional review of systems may be scanned into the media section ofthis medical record. Any responses requiring further intervention were pursued. Hemoglobin A1C (%)   Date Value   10/14/2019 6.9     Microscopic Examination (no units)   Date Value   07/01/2019 YES     LDL Calculated (mg/dL)   Date Value   03/23/2018 147 (H)     Past Medical History:   Diagnosis Date    Diabetes mellitus ([address]     Hyperlipidemia     Hypertension     Neuropathy     OA (osteoarthritis) 5/7/2013    Osteoarthritis     Type II or unspecified type diabetes mellitus without mention of complication, not stated as uncontrolled     Venous insufficiency      Family History   Problem Relation Age of Onset    Heart Disease Mother     Heart Disease Father      Social History     Socioeconomic History    Marital status:       Spouse name: Not on file    Number of children: Not on file    Years of education: Not on file    Highest education level: Not on file   Occupational History    Not on file   Social Needs    Financial resource strain: Not on file    Food insecurity     Worry: Not on file     Inability: Not on file    Transportation needs     Medical: Not on file     Non-medical: Not on file   Tobacco Use    Smoking status: Never Smoker    Smokeless tobacco: Never Used   Substance and Sexual Activity    Alcohol use: No     Alcohol/week: 0.0 standard drinks    Drug use: No    Sexual activity: Not Currently   Lifestyle    Physical activity     Days per week: Not on file     Minutes per session: Not on file    Stress: Not on file   Relationships    Social connections     Talks on phone: Not on file     Gets together: Not on file     Attends Druze service: Not on file     Active member of club or organization: Not on file     Attends meetings of clubs or organizations: Not on file     Relationship status: Not on file    Intimate partner violence     Fear of current or ex partner: Not on file     Emotionally abused: Not on file     Physically abused: Not on file     Forced sexual activity: Not on file   Other Topics Concern    Not on file   Social History Narrative    Not on file       Prior to Visit Medications    Medication Sig Taking?  Authorizing Provider   glipiZIDE (GLUCOTROL) 10 MG tablet TAKE ONE TABLET BY MOUTH TWICE A DAY  Chris Malcolm MD   pioglitazone (ACTOS) 45 MG tablet TAKE ONE TABLET BY MOUTH DAILY  Chris Malcolm MD   metFORMIN (GLUCOPHAGE) 500 MG tablet TAKE TWO TABLETS BY MOUTH TWICE A DAY  Chris Malcolm MD   losartan (COZAAR) 100 MG tablet Take 1 tablet by mouth daily  Chris Malcolm MD   hydroCHLOROthiazide (HYDRODIURIL) 25 MG tablet Take 1 tablet by mouth every morning  Chris Malcolm MD   celecoxib (CELEBREX) 100 MG capsule Take 1 capsule by mouth daily  Chris Malcolm MD   metoprolol tartrate (LOPRESSOR) 50 MG tablet TAKE ONE AND ONE-HALF (1 & 1/2) TABLET BY MOUTH TWO TIMES A DAY  Chris Malcolm MD   amLODIPine (NORVASC) 5 MG tablet TAKE ONE TABLET BY MOUTH EVERY 12 HOURS  Chris Malcolm MD   metoprolol tartrate (LOPRESSOR) 50 MG tablet Take 75 mg by mouth daily Historical Provider, MD   Probiotic Product (PROBIOTIC DAILY PO) Take by mouth  Historical Provider, MD   metoprolol tartrate (LOPRESSOR) 25 MG tablet Take 75 mg by mouth 2 times daily  Historical Provider, MD   vitamin B-12 (CYANOCOBALAMIN) 500 MCG tablet Take 500 mcg by mouth daily. Historical Provider, MD     Allergies   Allergen Reactions    Other Other (See Comments)     PURACOL AG dressing - burning       OBJECTIVE:  Estimated body mass index is 37 kg/m² as calculated from the following:    Height as of 7/8/19: 5' 1.5\" (1.562 m). Weight as of 4/28/20: 199 lb (90.3 kg). Vitals:    11/03/20 0807   BP: (!) 154/72   Site: Left Upper Arm   Position: Sitting   Pulse: 73   Temp: 97.6 °F (36.4 °C)   TempSrc: Oral   SpO2: 99%     BP Readings from Last 2 Encounters:   04/28/20 (!) 147/64   10/14/19 138/62     Wt Readings from Last 3 Encounters:   04/28/20 199 lb (90.3 kg)   07/05/19 200 lb (90.7 kg)   07/01/19 200 lb (90.7 kg)       Physical Exam  Vitals signs and nursing note reviewed. Constitutional:       General: She is not in acute distress. Appearance: She is well-developed. She is not diaphoretic. HENT:      Head: Normocephalic and atraumatic. Right Ear: External ear normal.      Left Ear: External ear normal.      Nose: Nose normal.   Eyes:      General: Lids are normal. No scleral icterus. Right eye: No discharge. Left eye: No discharge. Pupils: Pupils are equal, round, and reactive to light. Neck:      Thyroid: No thyromegaly. Vascular: No JVD. Cardiovascular:      Rate and Rhythm: Normal rate and regular rhythm. Heart sounds: Normal heart sounds. Pulmonary:      Effort: Pulmonary effort is normal. No respiratory distress. Breath sounds: Normal breath sounds. Abdominal:      Palpations: Abdomen is soft. There is no hepatomegaly or splenomegaly. Tenderness: There is no abdominal tenderness.    Musculoskeletal:      Right lower leg: Edema (3+) present. Left lower leg: Edema (3+) present. Skin:     General: Skin is warm and dry. Coloration: Skin is not pale. Findings: No erythema or rash. Comments: Turgor normal   Psychiatric:         Behavior: Behavior normal.         Thought Content: Thought content normal.         Judgment: Judgment normal.              ASSESSMENT PLAN      Diagnosis Orders   1. Type 2 diabetes mellitus without complication, unspecified whether long term insulin use (HCC)  Microalbumin / Creatinine Urine Ratio    Hemoglobin A1C   2. Benign essential HTN  Lipid Panel    COMPREHENSIVE METABOLIC PANEL   3. Morbidly obese (HCC)     4. Duodenal ulcer     5. DM (diabetes mellitus), type 2 with peripheral vascular complications ([facility] [address]     6. Primary osteoarthritis involving multiple joints     7. Mixed hyperlipidemia     8. Uncontrolled hypertension     No symptoms of elevated sugar low sugar readings. Update labs. Blood pressure has been somewhat high at home and at work. She had 3 different metoprolol dosings in her med record and she is supposed to call us back so we can see how she currently is taking the medicine and then we will make an adjustment. She does not take aspirin any longer since she had a duodenal ulcer, she went back on Celebrex at 100 mg a day she tried the topical over-the-counter anti-inflammatory without benefit. I did suggest that we start omeprazole 20 mg daily to minimize risk of ulcer recurrence. No signs of limb ischemia. Continue lipid monitoring as needed. Follow-up 6 months    Patient should call the office immediately with new or ongoing signs or symptoms or worsening, or proceed to the emergency room. No changes in past medical history, past surgical history, social history, or family history were noted during the patient encounter unless specifically listed above.   All updates of past medical history, past surgical history, social history, or family history were reviewed personally by me during the office visit. All problems listed in the assessment are stable unless noted otherwise. Medication profile reviewed personally by me during the visit. Medication side effects and possible impairments from medications were discussed as applicable. This document was prepared by a combination of typing and transcription through a voice recognition software. Scribe attestation: Jesus Pitts RN, am scribing for and in the presence of Sivakumar Johnson MD. Electronically signed by Jayden Gonzalez RN on 11/3/2020 at 8:05 AM      Provider attestation:     I, Dr. Dung Alvarez, personally performed the services described in this documentation, as scribed by the above signed scribe in my presence, and it is both accurate and complete. I agree with the ROS and Past Histories independently gathered by the clinical support staff and the remaining scribed note accurately describes my personal service to the patient.       11/3/2020    9:53 AM

## 2020-11-04 LAB
ESTIMATED AVERAGE GLUCOSE: 148.5 MG/DL
HBA1C MFR BLD: 6.8 %

## 2021-01-30 DIAGNOSIS — I10 ESSENTIAL HYPERTENSION: ICD-10-CM

## 2021-02-01 RX ORDER — AMLODIPINE BESYLATE 5 MG/1
TABLET ORAL
Qty: 180 TABLET | Refills: 2 | Status: SHIPPED | OUTPATIENT
Start: 2021-02-01 | End: 2021-04-20 | Stop reason: SDUPTHER

## 2021-02-01 NOTE — TELEPHONE ENCOUNTER
Last office visit was 11/3/20  Future Appointments   Date Time Provider Melony Pool   5/4/2021  7:40 AM Shahram Galdamez MD [place] [facility]

## 2021-02-04 ENCOUNTER — TELEPHONE (OUTPATIENT)
Dept: FAMILY MEDICINE CLINIC | Age: 81
End: 2021-02-04

## 2021-02-04 NOTE — TELEPHONE ENCOUNTER
Pt was wanting to see if she could get a script for her handicap placard and just give her a call and she will pick it up.

## 2021-04-05 RX ORDER — METOPROLOL TARTRATE 50 MG/1
TABLET, FILM COATED ORAL
Qty: 270 TABLET | Refills: 3 | Status: SHIPPED | OUTPATIENT
Start: 2021-04-05 | End: 2021-04-22 | Stop reason: SDUPTHER

## 2021-04-05 NOTE — TELEPHONE ENCOUNTER
Last OV 11/3/20  Future Appointments   Date Time Provider Melony Pool   5/4/2021  7:40 AM MD [facility]

## 2021-04-13 ENCOUNTER — VIRTUAL VISIT (OUTPATIENT)
Dept: FAMILY MEDICINE CLINIC | Age: 81
End: 2021-04-13
Payer: MEDICARE

## 2021-04-13 VITALS — HEIGHT: 61 IN | BODY MASS INDEX: 37.57 KG/M2 | WEIGHT: 199 LBS | TEMPERATURE: 97.6 F

## 2021-04-13 DIAGNOSIS — Z00.00 ROUTINE GENERAL MEDICAL EXAMINATION AT A HEALTH CARE FACILITY: Primary | ICD-10-CM

## 2021-04-13 PROCEDURE — 4040F PNEUMOC VAC/ADMIN/RCVD: CPT | Performed by: FAMILY MEDICINE

## 2021-04-13 PROCEDURE — 1123F ACP DISCUSS/DSCN MKR DOCD: CPT | Performed by: FAMILY MEDICINE

## 2021-04-13 PROCEDURE — G0438 PPPS, INITIAL VISIT: HCPCS | Performed by: FAMILY MEDICINE

## 2021-04-13 ASSESSMENT — PATIENT HEALTH QUESTIONNAIRE - PHQ9
SUM OF ALL RESPONSES TO PHQ QUESTIONS 1-9: 0
2. FEELING DOWN, DEPRESSED OR HOPELESS: 0

## 2021-04-13 ASSESSMENT — LIFESTYLE VARIABLES: HOW OFTEN DO YOU HAVE A DRINK CONTAINING ALCOHOL: 0

## 2021-04-13 NOTE — PROGRESS NOTES
Medicare Annual Wellness Visit  Name: Socorro Alford Date: 2021   MRN: <[id number]> Sex: Female   Age: 79 y.o. Ethnicity: Non-/Non    : 1940 Race: White      Della Minor is here for [facility]    Screenings for behavioral, psychosocial and functional/safety risks, and cognitive dysfunction are all negative except as indicated below. These results, as well as other patient data from the [address] form, are documented in Flowsheets linked to this Encounter. Allergies   Allergen Reactions    Other Other (See Comments)     PURACOL AG dressing - burning       Prior to Visit Medications    Medication Sig Taking? Authorizing Provider   metoprolol tartrate (LOPRESSOR) 50 MG tablet TAKE ONE AND ONE-HALF (1 & 1/2) TABLET BY MOUTH BY MOUTH TWO TIMES A DAY  Stefani Flores MD   amLODIPine ([address]) 5 MG tablet TAKE ONE TABLET BY MOUTH EVERY 12 HOURS  Stefani Flores MD   hydroCHLOROthiazide (HYDRODIURIL) 25 MG tablet TAKE ONE TABLET BY MOUTH EVERY MORNING  Stefani Flores MD   omeprazole (PRILOSEC) 20 MG delayed release capsule Take 1 capsule by mouth every morning (before breakfast)  Stefani Flores MD   glipiZIDE (GLUCOTROL) 10 MG tablet TAKE ONE TABLET BY MOUTH TWICE A DAY  Stefani Flores MD   pioglitazone (ACTOS) 45 MG tablet TAKE ONE TABLET BY MOUTH DAILY  Stefain Flores MD   metFORMIN (GLUCOPHAGE) 500 MG tablet TAKE TWO TABLETS BY MOUTH TWICE A DAY  Stefani Flores MD   losartan (COZAAR) 100 MG tablet Take 1 tablet by mouth daily  Stefani Flores MD   celecoxib (CELEBREX) 100 MG capsule Take 1 capsule by mouth daily  Patient taking differently: Take 100 mg by mouth daily Taking as needed  Stefani Flores MD   Probiotic Product (PROBIOTIC DAILY PO) Take by mouth  Historical Provider, MD   vitamin B-12 (CYANOCOBALAMIN) 500 MCG tablet Take 500 mcg by mouth daily.   Historical Provider, MD       Past Medical History:   Diagnosis Date    Diabetes mellitus ([facility] [address]     Hyperlipidemia     Hypertension     Neuropathy     OA (osteoarthritis) 2013    Osteoarthritis     Type II or unspecified type diabetes mellitus without mention of complication, not stated as uncontrolled     Venous insufficiency        Past Surgical History:   Procedure Laterality Date    CARPAL TUNNEL RELEASE       SECTION      KNEE SURGERY Bilateral     TOTAL KNEE ARTHROPLASTY  11    Bilateral Total Knee Replacements    UPPER GASTROINTESTINAL ENDOSCOPY N/A 2019    EGD BIOPSY performed by Yasemin Whitehead DO at [facility]       Family History   Problem Relation Age of Onset    Heart Disease Mother     Heart Disease Father        CareTeam (Including outside providers/suppliers regularly involved in providing care):   Patient Care Team:  Sloane Hackett MD as PCP - General  Sloane Hackett MD as PCP - [facility] Empaneled Provider    Wt Readings from Last 3 Encounters:   21 199 lb (90.3 kg)   20 199 lb (90.3 kg)   19 200 lb (90.7 kg)     Vitals:    21 1400   Temp: 97.6 °F (36.4 °C)   Weight: 199 lb (90.3 kg)   Height: 5' 1\" (1.549 m)     Body mass index is 37.6 kg/m². Patient reports vitals. Based upon direct observation of the patient, evaluation of cognition reveals recent and remote memory intact. Patient's complete Health Risk Assessment and screening values have been reviewed and are found in Flowsheets. The following problems were reviewed today and where indicated follow up appointments were made and/or referrals ordered.     Positive Risk Factor Screenings with Interventions:           Health Habits/Nutrition:  Health Habits/Nutrition  Do you exercise for at least 20 minutes 2-3 times per week?: (!) No  Have you lost any weight without trying in the past 3 months?: No  Do you eat only one meal per day?: No  Have you seen the dentist within the past year?: N/A - wear dentures  Body mass index: (!) 37.6  Health Habits/Nutrition Interventions:  · Inadequate physical activity:  patient is not ready to increase his/her physical activity level at this time    Hearing/Vision:  No exam data present  Hearing/Vision  Do you or your family notice any trouble with your hearing that hasn't been managed with hearing aids?: (!) Yes  Do you have difficulty driving, watching TV, or doing any of your daily activities because of your eyesight?: No  Have you had an eye exam within the past year?: Appointment is scheduled  Hearing/Vision Interventions:  · Hearing concerns:  patient declines any further evaluation/treatment for hearing issues      Personalized Preventive Plan   Current Health Maintenance Status    There is no immunization history on file for this patient. Health Maintenance   Topic Date Due    COVID-19 Vaccine (1) Never done    Pneumococcal 65+ years Vaccine (1 of 1 - PPSV23) Never done    Annual Wellness Visit (AWV)  Never done    Flu vaccine (Season Ended) 10/19/2021 (Originally 9/1/2021)    DTaP/Tdap/Td vaccine (1 - Tdap) 10/23/2023 (Originally 1/27/1959)    Shingles Vaccine (1 of 2) 10/20/2031 (Originally 1/27/1989)    Potassium monitoring  11/03/2021    Creatinine monitoring  11/03/2021    DEXA (modify frequency per FRAX score)  Addressed    Hepatitis A vaccine  Aged Out    Hib vaccine  Aged Out    Meningococcal (ACWY) vaccine  Aged Out     Recommendations for [employer] Due: see orders and patient instructions/AVS.  . Recommended screening schedule for the next 5-10 years is provided to the patient in written form: see Patient Instructions/AVS.    Irsrael BLAND LPN, 8/93/2008, performed the documented evaluation under the direct supervision of the attending physician. Della Minor, was evaluated through a synchronous (real-time) audio-video encounter.  The patient (or guardian if applicable) is aware that this is a billable service. Verbal consent to proceed has been obtained within the past 12 months. The visit was conducted pursuant to the emergency declaration under the [address] [place] [address] authority and the [employer] General Act. Patient identification was verified, and a caregiver was present when appropriate. The patient was located in a state where the provider was credentialed to provide care. Total time spent for this encounter: Not billed by time   Location [address] [place] [address] Dr Boudreaux-Isrrael Woodall LPN on 0/50/5422 at 3:40 PM    An electronic signature was used to authenticate this note. This encounter was performed under Oscar chan MDs, direct supervision, 4/13/2021.

## 2021-04-13 NOTE — PATIENT INSTRUCTIONS
Personalized Preventive Plan for Della Minor - 4/13/2021  Medicare offers a range of preventive health benefits. Some of the tests and screenings are paid in full while other may be subject to a deductible, co-insurance, and/or copay. Some of these benefits include a comprehensive review of your medical history including lifestyle, illnesses that may run in your family, and various assessments and screenings as appropriate. After reviewing your medical record and screening and assessments performed today your provider may have ordered immunizations, labs, imaging, and/or referrals for you. A list of these orders (if applicable) as well as your Preventive Care list are included within your After Visit Summary for your review. Other Preventive Recommendations:    · A preventive eye exam performed by an eye specialist is recommended every 1-2 years to screen for glaucoma; cataracts, macular degeneration, and other eye disorders. · A preventive dental visit is recommended every 6 months. · Try to get at least 150 minutes of exercise per week or 10,000 steps per day on a pedometer . · Order or download the FREE \"Exercise & Physical Activity: Your Everyday Guide\" from The "[employer] Data on Aging. Call 7-626.358.1320 or search The "[employer] Data on Aging online. · You need 1904-9125 mg of calcium and 3559-9035 IU of vitamin D per day. It is possible to meet your calcium requirement with diet alone, but a vitamin D supplement is usually necessary to meet this goal.  · When exposed to the sun, use a sunscreen that protects against both UVA and UVB radiation with an SPF of 30 or greater. Reapply every 2 to 3 hours or after sweating, drying off with a towel, or swimming. · Always wear a seat belt when traveling in a car. Always wear a helmet when riding a bicycle or motorcycle.     Keep Your Memory [place]       Many factors can affect your ability to remembera hectic lifestyle, aging, stress, chronic disease, and certain medicines. But, there are steps you can take to sharpen your mind and help preserve your memory. Challenge Your Brain   Regularly challenging your mind may help keeps it in top shape. Good mental exercises include:   Crossword puzzlesUse a dictionary if you need it; you will learn more that way. Brainteasers Try some! Crafts, such as wood working and sewing   Hobbies, such as gardening and building model airplanes   SocializingVisit old friends or join groups to meet new ones. Reading   Learning a new language   Taking a class, whether it be art history or carlos chi   TravelingExperience the food, history, and culture of your destination   Learning to use a computer   Going to museums, the theater, or thought-provoking movies   Changing things in your daily life, such as reversing your pattern in the grocery store or brushing your teeth using your nondominant hand   Use Memory Aids   There is no need to remember every detail on your own. These memory aids can help:   Calendars and day planners   Electronic organizers to store all sorts of helpful informationThese devices can \"beep\" to remind you of appointments. A book of days to record birthdays, anniversaries, and other occasions that occur on the same date every year   Detailed \"to-do\" lists and strategically placed sticky notes   Quick \"study\" sessionsBefore a gathering, review who will be there so their names will be fresh in your mind. Establish routinesFor example, keep your keys, wallet, and umbrella in the same place all the time or take medicine with your 8:00 AM glass of juice   Live a Healthy Life   Many actions that will keep your body strong will do the same for your mind. For example:   Talk to Your Doctor About Herbs and Supplements    Malnutrition and vitamin deficiencies can impair your mental function. For example, vitamin B12 deficiency can cause a range of symptoms, including confusion.  But, what if your nutritional needs are being met? Can herbs and supplements still offer a benefit? Researchers have investigated a range of natural remedies, such as ginkgo , ginseng , and the supplement phosphatidylserine (PS). So far, though, the evidence is inconsistent as to whether these products can improve memory or thinking. If you are interested in taking herbs and supplements, talk to your doctor first because they may interact with other medicines that you are taking. Exercise Regularly    Among the many benefits of regular exercise are increased blood flow to the brain and decreased risk of certain diseases that can interfere with memory function. One study found that even moderate exercise has a beneficial effect. Examples of \"moderate\" exercise include:   Playing 18 holes of golf once a week, without a cart   Playing tennis twice a week   Walking one mile per day   Manage Stress    It can be tough to remember what is important when your mind is cluttered. Make time for relaxation. Choose activities that calm you down, and make it routine. Manage Chronic Conditions    Side effects of high blood pressure , diabetes, and heart disease can interfere with mental function. Many of the lifestyle steps discussed here can help manage these conditions. Strive to eat a healthy diet, exercise regularly, get stress under control, and follow your doctor's advice for your condition. Minimize Medications    Talk to your doctor about the medicines that you take. Some may be unnecessary. Also, healthy lifestyle habits may lower the need for certain drugs.      Last Reviewed: April 2010 Yury Masterson MD   Updated: 4/13/2010   ·

## 2021-04-20 DIAGNOSIS — I10 ESSENTIAL HYPERTENSION: ICD-10-CM

## 2021-04-20 DIAGNOSIS — K26.9 DUODENAL ULCER: ICD-10-CM

## 2021-04-20 DIAGNOSIS — E11.8 TYPE 2 DIABETES MELLITUS WITH COMPLICATION (HCC): ICD-10-CM

## 2021-04-20 RX ORDER — GLIPIZIDE 10 MG/1
10 TABLET ORAL
Qty: 180 TABLET | Refills: 3 | Status: SHIPPED | OUTPATIENT
Start: 2021-04-20 | End: 2022-01-26

## 2021-04-20 RX ORDER — AMLODIPINE BESYLATE 5 MG/1
5 TABLET ORAL DAILY
Qty: 180 TABLET | Refills: 3 | Status: SHIPPED | OUTPATIENT
Start: 2021-04-20 | End: 2022-04-20

## 2021-04-20 RX ORDER — LOSARTAN POTASSIUM 100 MG/1
100 TABLET ORAL DAILY
Qty: 90 TABLET | Refills: 3 | Status: SHIPPED | OUTPATIENT
Start: 2021-04-20 | End: 2021-06-10

## 2021-04-20 RX ORDER — OMEPRAZOLE 20 MG/1
20 CAPSULE, DELAYED RELEASE ORAL
Qty: 90 CAPSULE | Refills: 5 | Status: SHIPPED | OUTPATIENT
Start: 2021-04-20 | End: 2022-04-18

## 2021-04-20 RX ORDER — PIOGLITAZONEHYDROCHLORIDE 45 MG/1
45 TABLET ORAL DAILY
Qty: 90 TABLET | Refills: 3 | Status: SHIPPED | OUTPATIENT
Start: 2021-04-20 | End: 2022-01-26

## 2021-04-22 RX ORDER — METOPROLOL TARTRATE 50 MG/1
TABLET, FILM COATED ORAL
Qty: 270 TABLET | Refills: 3 | Status: SHIPPED | OUTPATIENT
Start: 2021-04-22 | End: 2022-01-26

## 2021-05-04 ENCOUNTER — OFFICE VISIT (OUTPATIENT)
Dept: FAMILY MEDICINE CLINIC | Age: 81
End: 2021-05-04
Payer: MEDICARE

## 2021-05-04 VITALS
HEIGHT: 61 IN | SYSTOLIC BLOOD PRESSURE: 138 MMHG | HEART RATE: 72 BPM | DIASTOLIC BLOOD PRESSURE: 64 MMHG | OXYGEN SATURATION: 96 % | BODY MASS INDEX: 37.6 KG/M2

## 2021-05-04 DIAGNOSIS — E78.2 MIXED HYPERLIPIDEMIA: ICD-10-CM

## 2021-05-04 DIAGNOSIS — M15.9 PRIMARY OSTEOARTHRITIS INVOLVING MULTIPLE JOINTS: ICD-10-CM

## 2021-05-04 DIAGNOSIS — E11.9 TYPE 2 DIABETES MELLITUS WITHOUT COMPLICATION, UNSPECIFIED WHETHER LONG TERM INSULIN USE (HCC): Primary | ICD-10-CM

## 2021-05-04 DIAGNOSIS — E11.51 DM (DIABETES MELLITUS), TYPE 2 WITH PERIPHERAL VASCULAR COMPLICATIONS (HCC): ICD-10-CM

## 2021-05-04 DIAGNOSIS — E66.01 MORBIDLY OBESE (HCC): ICD-10-CM

## 2021-05-04 DIAGNOSIS — I87.2 VENOUS INSUFFICIENCY: ICD-10-CM

## 2021-05-04 PROCEDURE — 36415 COLL VENOUS BLD VENIPUNCTURE: CPT | Performed by: FAMILY MEDICINE

## 2021-05-04 PROCEDURE — 4040F PNEUMOC VAC/ADMIN/RCVD: CPT | Performed by: FAMILY MEDICINE

## 2021-05-04 PROCEDURE — G8400 PT W/DXA NO RESULTS DOC: HCPCS | Performed by: FAMILY MEDICINE

## 2021-05-04 PROCEDURE — 99214 OFFICE O/P EST MOD 30 MIN: CPT | Performed by: FAMILY MEDICINE

## 2021-05-04 PROCEDURE — 1090F PRES/ABSN URINE INCON ASSESS: CPT | Performed by: FAMILY MEDICINE

## 2021-05-04 PROCEDURE — 1123F ACP DISCUSS/DSCN MKR DOCD: CPT | Performed by: FAMILY MEDICINE

## 2021-05-04 PROCEDURE — 1036F TOBACCO NON-USER: CPT | Performed by: FAMILY MEDICINE

## 2021-05-04 PROCEDURE — G8427 DOCREV CUR MEDS BY ELIG CLIN: HCPCS | Performed by: FAMILY MEDICINE

## 2021-05-04 PROCEDURE — G8417 CALC BMI ABV UP PARAM F/U: HCPCS | Performed by: FAMILY MEDICINE

## 2021-05-04 NOTE — PROGRESS NOTES
Chief Complaint   Patient presents with    Diabetes     does not check BS at home. she does have hypoglycemia sometimes    Hyperlipidemia    Hypertension     varies but is usually good. no chest pain or SOB    Arthritis    Other     patient has multiple medical complaints       Wt Readings from Last 3 Encounters:   21 199 lb (90.3 kg)   20 199 lb (90.3 kg)   19 200 lb (90.7 kg)     BP Readings from Last 3 Encounters:   21 138/64   20 (!) 154/72   20 (!) 147/64      Lab Results   Component Value Date    LABA1C 6.8 2020    LABA1C 6.9 10/14/2019    LABA1C 6.4 2019       HPI:  [place] Dinh is a 79 y.o. (: 1940) here today for    Diabetes: patient does not check her sugars at home but she does note that she has felt like her sugars have bottomed out a couple times. She felt that last night her sugar bottomed out and she ate a reeses cup to help it. Will get updated A1C today. Hypertension: patient checks her blood pressure at home. She states that her blood pressure typically runs in under 140/90 occasionally will get a reading of 150/80s. Denies any shortness of breath of chest pain. GERD: patient states that she has not felt like she has had any issues with her acid reflux or with any ulcer symptoms. She did not pursue the foot injections. She also stopped taking the celebrex felt it was not benefiting her. Discussed with patient the covid vaccine. She is not sure if she wants the vaccine at this time. [x] Patient has completed an advance directive  [] Patient has NOT completed an advanced directive  [x] Patient has a documented healthcare surrogate  [] Patient does NOT have a documented healthcare surrogate  [] Discussed the importance of establishing and updating an advanced directive. Patient has questions at this time and those were answered. [x] Discussed the importance of establishing and updating an advanced directive.   Patient does NOT have questions at this time. Discussed with: [x] Patient            [] Family             [] Other caregiver    Patient's medications, allergies, past medical, surgical, social and family histories were reviewed and updated asappropriate on 5/4/2021 at 8:35 AM.    ROS:  Review of Systems    All other systems reviewed and are negative except as noted above on 5/4/2021 at 8:35 AM. Additional review of systems may be scanned into the media section ofthis medical record. Any responses requiring further intervention were pursued. Past Medical History:   Diagnosis Date    Diabetes mellitus ([address]     Hyperlipidemia     Hypertension     Neuropathy     OA (osteoarthritis) 5/7/2013    Osteoarthritis     Type II or unspecified type diabetes mellitus without mention of complication, not stated as uncontrolled     Venous insufficiency      Family History   Problem Relation Age of Onset    Heart Disease Mother     Heart Disease Father      Social History     Socioeconomic History    Marital status:       Spouse name: Not on file    Number of children: Not on file    Years of education: Not on file    Highest education level: Not on file   Occupational History    Not on file   Social Needs    Financial resource strain: Not on file    Food insecurity     Worry: Not on file     Inability: Not on file    Transportation needs     Medical: Not on file     Non-medical: Not on file   Tobacco Use    Smoking status: Never Smoker    Smokeless tobacco: Never Used   Substance and Sexual Activity    Alcohol use: No     Alcohol/week: 0.0 standard drinks    Drug use: No    Sexual activity: Not Currently   Lifestyle    Physical activity     Days per week: Not on file     Minutes per session: Not on file    Stress: Not on file   Relationships    Social connections     Talks on phone: Not on file     Gets together: Not on file     Attends [employer] service: Not on file     Active member of club or organization: Not on file     Attends meetings of clubs or organizations: Not on file     Relationship status: Not on file    Intimate partner violence     Fear of current or ex partner: Not on file     Emotionally abused: Not on file     Physically abused: Not on file     Forced sexual activity: Not on file   Other Topics Concern    Not on file   Social History Narrative    Not on file     Prior to Visit Medications    Medication Sig Taking? Authorizing Provider   metoprolol tartrate (LOPRESSOR) 50 MG tablet TAKE ONE AND ONE-HALF (1 & 1/2) TABLET BY MOUTH BY MOUTH TWO TIMES A DAY Yes Stefani Flores MD   glipiZIDE (GLUCOTROL) 10 MG tablet Take 1 tablet by mouth 2 times daily (before meals) Yes Stefani Flores MD   omeprazole (PRILOSEC) 20 MG delayed release capsule Take 1 capsule by mouth every morning (before breakfast) Yes Stefani Flores MD   amLODIPine (NORVASC) 5 MG tablet Take 1 tablet by mouth daily Yes Stefani Flores MD   losartan (COZAAR) 100 MG tablet Take 1 tablet by mouth daily Yes Stefani Flores MD   metFORMIN (GLUCOPHAGE) 500 MG tablet Take 1 tablet by mouth 2 times daily Yes Stefani Flores MD   pioglitazone (ACTOS) 45 MG tablet Take 1 tablet by mouth daily Yes Stefani Flores MD   hydroCHLOROthiazide (HYDRODIURIL) 25 MG tablet TAKE ONE TABLET BY MOUTH EVERY MORNING Yes Stefani Flores MD   celecoxib (CELEBREX) 100 MG capsule Take 1 capsule by mouth daily  Patient taking differently: Take 100 mg by mouth daily Taking as needed Yes Stefani Flores MD   vitamin B-12 (CYANOCOBALAMIN) 500 MCG tablet Take 500 mcg by mouth daily. Yes Historical Provider, MD     Allergies   Allergen Reactions    Other Other (See Comments)     PURACOL AG dressing - burning       OBJECTIVE:  Estimated body mass index is 37.6 kg/m² as calculated from the following:    Height as of this encounter: 5' 1\" (1.549 m).     Weight as of hyperlipidemia     6. Primary osteoarthritis involving multiple joints     Patient does not check her sugar, thought her sugar went too low last night. Update her A1c. She is not interested in statins. Did not get any ankle injection. She continues on the omeprazole. States her blood pressure at home typically is good. Did not allow us to weigh her. Lower extremity swelling is at her baseline. We will discuss in the future whether to even check cholesterol since she does not wish any treatment for it. She has no acute limb ischemia episodes. Follow-up 6 months. 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 She states she did have Covid in the past but has no lingering symptoms. Patient should call the office immediately with new or ongoing signs or symptoms or worsening, or proceed to the emergency room. No changes in past medical history, past surgical history, social history, or family history were noted during the patient encounter unless specifically listed above. All updates of past medical history, past surgical history, social history, or family history were reviewed personally by me during the office visit. All problems listed in the assessment are stable unless noted otherwise. Medication profile reviewed personally by me during the visit. Medication side effects and possible impairments from medications were discussed as applicable. This document was prepared by a combination of typing and transcription through a voice recognition software. Scribe attestation: Anastasia Celaya RN, am scribing for and in the presence of Laura Stein MD. Electronically signed by Nay Griffin RN on 5/4/2021 at 8:35 AM      Provider attestation:     I, Dr. Bill Quinonez, personally performed the services described in this documentation, as scribed by the above signed scribe in my presence, and it is both accurate and complete.  I agree

## 2021-05-04 NOTE — PATIENT INSTRUCTIONS
Patient should call the office immediately with new or ongoing signs or symptoms or worsening, or proceed to the emergency room. If you are on medications which could impair your senses, you are at risk of weakness, falls, dizziness, or drowsiness. You should be careful during activities which could place you at risk of harm, such as climbing, using stairs, operating machinery, or driving vehicles. If you feel you cannot safely do these activities, you should request others to help you, or avoid the activities altogether. If you are drowsy for any other reason, you should use the same precautions as listed above. Web Address for Advance Directive:    [email or web address]     Scheduling a Covid Vaccine:    Website: [email or web address] [email or web address] [place].gov. [place]    Call: [phone] ([id number])    The medication list included in this document is our record of what you are currently taking, including any changes that were made at today's visit. If you find any differences when compared to your medications at home, or have any questions that were not answered at your visit, please contact the office.

## 2021-05-05 LAB
ESTIMATED AVERAGE GLUCOSE: 162.8 MG/DL
HBA1C MFR BLD: 7.3 %

## 2021-06-10 RX ORDER — LOSARTAN POTASSIUM 100 MG/1
TABLET ORAL
Qty: 90 TABLET | Refills: 2 | Status: SHIPPED | OUTPATIENT
Start: 2021-06-10 | End: 2022-02-14

## 2021-06-30 RX ORDER — HYDROCHLOROTHIAZIDE 25 MG/1
25 TABLET ORAL DAILY
Qty: 90 TABLET | Refills: 3 | Status: SHIPPED | OUTPATIENT
Start: 2021-06-30 | End: 2022-07-07

## 2021-08-25 ENCOUNTER — APPOINTMENT (OUTPATIENT)
Dept: GENERAL RADIOLOGY | Age: 81
End: 2021-08-25
Payer: MEDICARE

## 2021-08-25 ENCOUNTER — HOSPITAL ENCOUNTER (OUTPATIENT)
Dept: GENERAL RADIOLOGY | Age: 81
Discharge: HOME | End: 2021-08-25
Payer: MEDICARE

## 2021-08-25 ENCOUNTER — HOSPITAL ENCOUNTER (OUTPATIENT)
Age: 81
Discharge: HOME | End: 2021-08-25
Payer: MEDICARE

## 2021-08-25 DIAGNOSIS — S99.921A INJURY OF RIGHT GREAT TOE, INITIAL ENCOUNTER: ICD-10-CM

## 2021-08-25 DIAGNOSIS — S99.921A INJURY OF RIGHT GREAT TOE, INITIAL ENCOUNTER: Primary | ICD-10-CM

## 2021-08-25 PROCEDURE — 73660 X-RAY EXAM OF TOE(S): CPT

## 2021-11-12 ENCOUNTER — OFFICE VISIT (OUTPATIENT)
Dept: FAMILY MEDICINE CLINIC | Age: 81
End: 2021-11-12
Payer: MEDICARE

## 2021-11-12 VITALS
BODY MASS INDEX: 37.6 KG/M2 | OXYGEN SATURATION: 98 % | HEIGHT: 61 IN | SYSTOLIC BLOOD PRESSURE: 138 MMHG | DIASTOLIC BLOOD PRESSURE: 84 MMHG | HEART RATE: 86 BPM

## 2021-11-12 DIAGNOSIS — I10 ELEVATED BLOOD PRESSURE READING IN OFFICE WITH DIAGNOSIS OF HYPERTENSION: ICD-10-CM

## 2021-11-12 DIAGNOSIS — E11.51 DM (DIABETES MELLITUS), TYPE 2 WITH PERIPHERAL VASCULAR COMPLICATIONS (HCC): ICD-10-CM

## 2021-11-12 DIAGNOSIS — E11.9 TYPE 2 DIABETES MELLITUS WITHOUT COMPLICATION, UNSPECIFIED WHETHER LONG TERM INSULIN USE (HCC): Primary | ICD-10-CM

## 2021-11-12 DIAGNOSIS — E78.2 MIXED HYPERLIPIDEMIA: ICD-10-CM

## 2021-11-12 DIAGNOSIS — I10 BENIGN ESSENTIAL HYPERTENSION: ICD-10-CM

## 2021-11-12 DIAGNOSIS — M15.9 PRIMARY OSTEOARTHRITIS INVOLVING MULTIPLE JOINTS: ICD-10-CM

## 2021-11-12 DIAGNOSIS — I10 ESSENTIAL HYPERTENSION: ICD-10-CM

## 2021-11-12 LAB
A/G RATIO: 1.5 (ref 1.1–2.2)
ALBUMIN SERPL-MCNC: 4.5 G/DL (ref 3.4–5)
ALP BLD-CCNC: 69 U/L (ref 40–129)
ALT SERPL-CCNC: 10 U/L (ref 10–40)
ANION GAP SERPL CALCULATED.3IONS-SCNC: 17 MMOL/L (ref 3–16)
AST SERPL-CCNC: 13 U/L (ref 15–37)
BILIRUB SERPL-MCNC: <0.2 MG/DL (ref 0–1)
BUN BLDV-MCNC: 27 MG/DL (ref 7–20)
CALCIUM SERPL-MCNC: 9.6 MG/DL (ref 8.3–10.6)
CHLORIDE BLD-SCNC: 100 MMOL/L (ref 99–110)
CHOLESTEROL, TOTAL: 254 MG/DL (ref 0–199)
CO2: 23 MMOL/L (ref 21–32)
CREAT SERPL-MCNC: 1.1 MG/DL (ref 0.6–1.2)
CREATININE URINE: 67.3 MG/DL (ref 28–259)
GFR AFRICAN AMERICAN: 57
GFR NON-AFRICAN AMERICAN: 47
GLUCOSE BLD-MCNC: 119 MG/DL (ref 70–99)
HDLC SERPL-MCNC: 51 MG/DL (ref 40–60)
LDL CHOLESTEROL CALCULATED: 173 MG/DL
MICROALBUMIN UR-MCNC: 2.7 MG/DL
MICROALBUMIN/CREAT UR-RTO: 40.1 MG/G (ref 0–30)
POTASSIUM SERPL-SCNC: 4.7 MMOL/L (ref 3.5–5.1)
SODIUM BLD-SCNC: 140 MMOL/L (ref 136–145)
TOTAL PROTEIN: 7.6 G/DL (ref 6.4–8.2)
TRIGL SERPL-MCNC: 151 MG/DL (ref 0–150)
VLDLC SERPL CALC-MCNC: 30 MG/DL

## 2021-11-12 PROCEDURE — G8400 PT W/DXA NO RESULTS DOC: HCPCS | Performed by: FAMILY MEDICINE

## 2021-11-12 PROCEDURE — G8427 DOCREV CUR MEDS BY ELIG CLIN: HCPCS | Performed by: FAMILY MEDICINE

## 2021-11-12 PROCEDURE — G8484 FLU IMMUNIZE NO ADMIN: HCPCS | Performed by: FAMILY MEDICINE

## 2021-11-12 PROCEDURE — 99214 OFFICE O/P EST MOD 30 MIN: CPT | Performed by: FAMILY MEDICINE

## 2021-11-12 PROCEDURE — 36415 COLL VENOUS BLD VENIPUNCTURE: CPT | Performed by: FAMILY MEDICINE

## 2021-11-12 PROCEDURE — 4040F PNEUMOC VAC/ADMIN/RCVD: CPT | Performed by: FAMILY MEDICINE

## 2021-11-12 PROCEDURE — 3051F HG A1C>EQUAL 7.0%<8.0%: CPT | Performed by: FAMILY MEDICINE

## 2021-11-12 PROCEDURE — 1123F ACP DISCUSS/DSCN MKR DOCD: CPT | Performed by: FAMILY MEDICINE

## 2021-11-12 PROCEDURE — 1036F TOBACCO NON-USER: CPT | Performed by: FAMILY MEDICINE

## 2021-11-12 PROCEDURE — G8417 CALC BMI ABV UP PARAM F/U: HCPCS | Performed by: FAMILY MEDICINE

## 2021-11-12 PROCEDURE — 1090F PRES/ABSN URINE INCON ASSESS: CPT | Performed by: FAMILY MEDICINE

## 2021-11-12 NOTE — PROGRESS NOTES
Chief Complaint   Patient presents with    Diabetes    Hypertension    Hyperlipidemia    Other     pt has multiple medical conditions. Internal Administration   First Dose      Second Dose           Last COVID Lab No results found for: SARS-COV-2, SARS-COV-2 RNA, SARS-COV-2, SARS-COV-2, SARS-COV-2 BY PCR, SARS-COV-2, SARS-COV-2, SARS-COV-2          Wt Readings from Last 3 Encounters:   21 199 lb (90.3 kg)   20 199 lb (90.3 kg)   19 200 lb (90.7 kg)     BP Readings from Last 3 Encounters:   21 138/84   21 138/64   20 (!) 154/72      Lab Results   Component Value Date    LABA1C 7.3 2021    LABA1C 6.8 2020    LABA1C 6.9 10/14/2019       HPI:  Damion Zimmerman is a 79 y.o. (: 1940) here today for    Patients blood pressure is up today in the office. But she states that she monitors her blood pressures at home and they are almost always in the 130s/80s. She states that her sugars have been running okay. Does have occasional low readings but she knows its due to going too long without eating. [] Patient has completed an advance directive  [x] Patient has NOT completed an advanced directive  [] Patient has a documented healthcare surrogate  [x] Patient does NOT have a documented healthcare surrogate  [] Discussed the importance of establishing and updating an advanced directive. Patient has questions at this time and those were answered. [x] Discussed the importance of establishing and updating an advanced directive. Patient does NOT have questions at this time.     Discussed with: [x] Patient            [] Family             [] Other caregiver    Patient's medications, allergies, past medical, surgical, social and family histories were reviewed and updated asappropriate on 2021 at 8:07 AM.    ROS:  Review of Systems    All other systems reviewed and are negative except as noted above on 2021 at 8:07 AM. Additional review of systems [facility]. Any responses requiring further intervention were pursued. Past Medical History:   Diagnosis Date    Diabetes mellitus ([address]     Hyperlipidemia     Hypertension     Neuropathy     OA (osteoarthritis) 5/7/2013    Osteoarthritis     Type II or unspecified type diabetes mellitus without mention of complication, not stated as uncontrolled     Venous insufficiency      Family History   Problem Relation Age of Onset    Heart Disease Mother     Heart Disease Father      Social History     Socioeconomic History    Marital status:      Spouse name: Not on file    Number of children: Not on file    Years of education: Not on file    Highest education level: Not on file   Occupational History    Not on file   Tobacco Use    Smoking status: Never Smoker    Smokeless tobacco: Never Used   Vaping Use    Vaping Use: Never used   Substance and Sexual Activity    Alcohol use: No     Alcohol/week: 0.0 standard drinks    Drug use: No    Sexual activity: Not Currently   Other Topics Concern    Not on file   Social History Narrative    Not on file     Social Determinants of Health     Financial Resource Strain:     Difficulty of Paying Living Expenses: Not on file   Food Insecurity:     Worried About Running Out of Food in the Last Year: Not on file    Destin of Food in the Last Year: Not on file   Transportation Needs:     Lack of Transportation (Medical): Not on file    Lack of Transportation (Non-Medical):  Not on file   Physical Activity:     Days of Exercise per Week: Not on file    Minutes of Exercise per Session: Not on file   Stress:     Feeling of Stress : Not on file   Social Connections:     Frequency of Communication with Friends and Family: Not on file    Frequency of Social Gatherings with Friends and Family: Not on file    Attends Confucianism Services: Not on file    Active Member of Clubs or Organizations: Not on file    Attends Club or Organization Meetings: Not on file    Marital Status: Not on file   Intimate Partner Violence:     Fear of Current or Ex-Partner: Not on file    Emotionally Abused: Not on file    Physically Abused: Not on file    Sexually Abused: Not on file   Housing Stability:     Unable to Pay for Housing in the Last Year: Not on file    Number of Yuri in the Last Year: Not on file    Unstable Housing in the Last Year: Not on file     Prior to Visit Medications    Medication Sig Taking? Authorizing Provider   hydroCHLOROthiazide (HYDRODIURIL) 25 MG tablet Take 1 tablet by mouth daily Yes Sundeep Boston MD   losartan (COZAAR) 100 MG tablet TAKE ONE TABLET BY MOUTH DAILY Yes Sundeep Boston MD   metoprolol tartrate (LOPRESSOR) 50 MG tablet TAKE ONE AND ONE-HALF (1 & 1/2) TABLET BY MOUTH BY MOUTH TWO TIMES A DAY Yes Sundeep Boston MD   glipiZIDE (GLUCOTROL) 10 MG tablet Take 1 tablet by mouth 2 times daily (before meals) Yes Sundeep Boston MD   omeprazole (PRILOSEC) 20 MG delayed release capsule Take 1 capsule by mouth every morning (before breakfast) Yes Sundeep Boston MD   amLODIPine (NORVASC) 5 MG tablet Take 1 tablet by mouth daily Yes Sundeep Boston MD   metFORMIN (GLUCOPHAGE) 500 MG tablet Take 1 tablet by mouth 2 times daily Yes Sundeep Boston MD   pioglitazone (ACTOS) 45 MG tablet Take 1 tablet by mouth daily Yes Sundeep Boston MD   vitamin B-12 (CYANOCOBALAMIN) 500 MCG tablet Take 500 mcg by mouth daily. Yes Historical Provider, MD     Allergies   Allergen Reactions    Other Other (See Comments)     PURACOL AG dressing - burning       OBJECTIVE:  Estimated body mass index is 37.6 kg/m² as calculated from the following:    Height as of this encounter: 5' 1\" (1.549 m). Weight as of 4/13/21: 199 lb (90.3 kg).   Vitals:    11/12/21 0746   BP: 138/84   Site: Right Upper Arm   Position: Sitting osteoarthritis involving multiple joints     5. DM (diabetes mellitus), type 2 with peripheral vascular complications ([facility] [address]     6. Benign essential hypertension     7. Elevated blood pressure reading in office with diagnosis of hypertension     No symptoms of elevated sugar but she does not monitor. Blood pressure at work has been acceptable. Continue lipid monitoring as needed. Arthritis stable. She does not take any vaccines. Follow-up 6 months. Patient should call the office immediately with new or ongoing signs or symptoms or worsening, or proceed to the emergency room. No changes in past medical history, past surgical history, social history, or family history were noted during the patient encounter unless specifically listed above. All updates of past medical history, past surgical history, social history, or family history were reviewed personally by me during the office visit. All problems listed in the assessment are stable unless noted otherwise. Medication profile reviewed personally by me during the visit. Medication side effects and possible impairments from medications were discussed as applicable. This document was prepared by a combination of typing and transcription through a voice recognition software. Scribe attestation: Renetta Mcdowell RN, am scribing for and in the presence of Makayla Vargas MD. Electronically signed by Sandrita Raygoza RN on 11/12/2021 at 8:07 AM      Provider attestation:     I, Dr. Barb Rothman, personally performed the services described in this documentation, as scribed by the above signed scribe in my presence, and it is both accurate and complete. I agree with the ROS and Past Histories independently gathered by the clinical support staff and the remaining scribed note accurately describes my personal service to the patient.       11/12/2021    8:24 AM

## 2021-11-12 NOTE — PATIENT INSTRUCTIONS

## 2021-11-13 LAB
ESTIMATED AVERAGE GLUCOSE: 154.2 MG/DL
HBA1C MFR BLD: 7 %

## 2022-01-26 RX ORDER — METOPROLOL TARTRATE 50 MG/1
TABLET, FILM COATED ORAL
Qty: 270 TABLET | Refills: 3 | Status: SHIPPED | OUTPATIENT
Start: 2022-01-26

## 2022-01-26 RX ORDER — GLIPIZIDE 10 MG/1
TABLET ORAL
Qty: 180 TABLET | Refills: 3 | Status: SHIPPED | OUTPATIENT
Start: 2022-01-26

## 2022-01-26 RX ORDER — PIOGLITAZONEHYDROCHLORIDE 45 MG/1
45 TABLET ORAL DAILY
Qty: 90 TABLET | Refills: 3 | Status: SHIPPED | OUTPATIENT
Start: 2022-01-26

## 2022-02-14 RX ORDER — LOSARTAN POTASSIUM 100 MG/1
TABLET ORAL
Qty: 90 TABLET | Refills: 3 | Status: SHIPPED | OUTPATIENT
Start: 2022-02-14

## 2022-04-16 DIAGNOSIS — K26.9 DUODENAL ULCER: ICD-10-CM

## 2022-04-18 RX ORDER — OMEPRAZOLE 20 MG/1
CAPSULE, DELAYED RELEASE ORAL
Qty: 90 CAPSULE | Refills: 3 | Status: SHIPPED | OUTPATIENT
Start: 2022-04-18

## 2022-04-19 DIAGNOSIS — E11.8 TYPE 2 DIABETES MELLITUS WITH COMPLICATION (HCC): ICD-10-CM

## 2022-04-19 DIAGNOSIS — I10 ESSENTIAL HYPERTENSION: ICD-10-CM

## 2022-04-20 RX ORDER — AMLODIPINE BESYLATE 5 MG/1
5 TABLET ORAL DAILY
Qty: 90 TABLET | Refills: 3 | Status: SHIPPED | OUTPATIENT
Start: 2022-04-20

## 2022-05-10 ENCOUNTER — OFFICE VISIT (OUTPATIENT)
Dept: FAMILY MEDICINE CLINIC | Age: 82
End: 2022-05-10
Payer: MEDICARE

## 2022-05-10 VITALS — SYSTOLIC BLOOD PRESSURE: 138 MMHG | DIASTOLIC BLOOD PRESSURE: 88 MMHG | HEART RATE: 76 BPM | OXYGEN SATURATION: 95 %

## 2022-05-10 DIAGNOSIS — I10 ELEVATED BLOOD PRESSURE READING IN OFFICE WITH DIAGNOSIS OF HYPERTENSION: ICD-10-CM

## 2022-05-10 DIAGNOSIS — M15.9 PRIMARY OSTEOARTHRITIS INVOLVING MULTIPLE JOINTS: ICD-10-CM

## 2022-05-10 DIAGNOSIS — E66.01 MORBIDLY OBESE (HCC): ICD-10-CM

## 2022-05-10 DIAGNOSIS — I87.2 VENOUS INSUFFICIENCY: ICD-10-CM

## 2022-05-10 DIAGNOSIS — I10 BENIGN ESSENTIAL HYPERTENSION: Primary | ICD-10-CM

## 2022-05-10 DIAGNOSIS — E11.51 DM (DIABETES MELLITUS), TYPE 2 WITH PERIPHERAL VASCULAR COMPLICATIONS (HCC): ICD-10-CM

## 2022-05-10 DIAGNOSIS — E78.2 MIXED HYPERLIPIDEMIA: ICD-10-CM

## 2022-05-10 LAB
FOLATE: 19.24 NG/ML (ref 4.78–24.2)
VITAMIN B-12: 1086 PG/ML (ref 211–911)
VITAMIN D 25-HYDROXY: 19.8 NG/ML

## 2022-05-10 PROCEDURE — 4040F PNEUMOC VAC/ADMIN/RCVD: CPT | Performed by: FAMILY MEDICINE

## 2022-05-10 PROCEDURE — G8400 PT W/DXA NO RESULTS DOC: HCPCS | Performed by: FAMILY MEDICINE

## 2022-05-10 PROCEDURE — 99214 OFFICE O/P EST MOD 30 MIN: CPT | Performed by: FAMILY MEDICINE

## 2022-05-10 PROCEDURE — G8417 CALC BMI ABV UP PARAM F/U: HCPCS | Performed by: FAMILY MEDICINE

## 2022-05-10 PROCEDURE — G8427 DOCREV CUR MEDS BY ELIG CLIN: HCPCS | Performed by: FAMILY MEDICINE

## 2022-05-10 PROCEDURE — 36415 COLL VENOUS BLD VENIPUNCTURE: CPT | Performed by: FAMILY MEDICINE

## 2022-05-10 PROCEDURE — 1123F ACP DISCUSS/DSCN MKR DOCD: CPT | Performed by: FAMILY MEDICINE

## 2022-05-10 PROCEDURE — 1036F TOBACCO NON-USER: CPT | Performed by: FAMILY MEDICINE

## 2022-05-10 PROCEDURE — 1090F PRES/ABSN URINE INCON ASSESS: CPT | Performed by: FAMILY MEDICINE

## 2022-05-10 SDOH — ECONOMIC STABILITY: FOOD INSECURITY: WITHIN THE PAST 12 MONTHS, THE FOOD YOU BOUGHT JUST DIDN'T LAST AND YOU DIDN'T HAVE MONEY TO GET MORE.: NEVER TRUE

## 2022-05-10 SDOH — ECONOMIC STABILITY: FOOD INSECURITY: WITHIN THE PAST 12 MONTHS, YOU WORRIED THAT YOUR FOOD WOULD RUN OUT BEFORE YOU GOT MONEY TO BUY MORE.: NEVER TRUE

## 2022-05-10 ASSESSMENT — PATIENT HEALTH QUESTIONNAIRE - PHQ9
1. LITTLE INTEREST OR PLEASURE IN DOING THINGS: 0
SUM OF ALL RESPONSES TO PHQ QUESTIONS 1-9: 0
SUM OF ALL RESPONSES TO PHQ QUESTIONS 1-9: 0
2. FEELING DOWN, DEPRESSED OR HOPELESS: 0
SUM OF ALL RESPONSES TO PHQ QUESTIONS 1-9: 0
SUM OF ALL RESPONSES TO PHQ QUESTIONS 1-9: 0
SUM OF ALL RESPONSES TO PHQ9 QUESTIONS 1 & 2: 0

## 2022-05-10 ASSESSMENT — SOCIAL DETERMINANTS OF HEALTH (SDOH): HOW HARD IS IT FOR YOU TO PAY FOR THE VERY BASICS LIKE FOOD, HOUSING, MEDICAL CARE, AND HEATING?: NOT HARD AT ALL

## 2022-05-10 NOTE — PROGRESS NOTES
Chief Complaint   Patient presents with    Hypertension     avg 140/80-90    Hyperlipidemia    Diabetes     dont check        Internal Administration   First Dose      Second Dose           Last COVID Lab No results found for: SARS-COV-2, SARS-COV-2 RNA, SARS-COV-2, SARS-COV-2, SARS-COV-2 BY PCR, SARS-COV-2, SARS-COV-2, SARS-COV-2          Wt Readings from Last 3 Encounters:   21 199 lb (90.3 kg)   20 199 lb (90.3 kg)   19 200 lb (90.7 kg)     BP Readings from Last 3 Encounters:   05/10/22 (!) 155/59   21 138/84   21 138/64      Lab Results   Component Value Date    LABA1C 7.0 2021    LABA1C 7.3 2021    LABA1C 6.8 2020       HPI:  Viky Bustamante is a 79 y.o. (: 1940) here today for    Patient states that her blood pressures run in the 130-140/80-90. She does not check her blood sugars at home. She states she continues to have pain in her ankles due to the arthritis. She manages okay but states it seems to be getting worse but does nt want to do anything about it at this time. [] Patient has completed an advance directive  [x] Patient has NOT completed an advanced directive  [] Patient has a documented healthcare surrogate  [x] Patient does NOT have a documented healthcare surrogate  [] Discussed the importance of establishing and updating an advanced directive. Patient has questions at this time and those were answered. [x] Discussed the importance of establishing and updating an advanced directive. Patient does NOT have questions at this time.     Discussed with: [x] Patient            [] Family             [] Other caregiver    Patient's medications, allergies, past medical, surgical, social and family histories were reviewed and updated asappropriate on 5/10/2022 at 7:55 AM.    ROS:  Review of Systems    All other systems reviewed and are negative except as noted above on 5/10/2022 at 7:55 AM. Additional review of systems may be scanned into the media section ofthis medical record. Any responses requiring further intervention were pursued. Past Medical History:   Diagnosis Date    Diabetes mellitus ([facility] [address]     Hyperlipidemia     Hypertension     Neuropathy     OA (osteoarthritis) 5/7/2013    Osteoarthritis     Type II or unspecified type diabetes mellitus without mention of complication, not stated as uncontrolled     Venous insufficiency      Family History   Problem Relation Age of Onset    Heart Disease Mother     Heart Disease Father      Social History     Socioeconomic History    Marital status:      Spouse name: Not on file    Number of children: Not on file    Years of education: Not on file    Highest education level: Not on file   Occupational History    Not on file   Tobacco Use    Smoking status: Never Smoker    Smokeless tobacco: Never Used   Vaping Use    Vaping Use: Never used   Substance and Sexual Activity    Alcohol use: No     Alcohol/week: 0.0 standard drinks    Drug use: No    Sexual activity: Not Currently   Other Topics Concern    Not on file   Social History Narrative    Not on file     Social Determinants of Health     Financial Resource Strain: Low Risk     Difficulty of Paying Living Expenses: Not hard at all   Food Insecurity: No Food Insecurity    Worried About [employer] [employer] in the Last Year: Never true    [facility] in the Last Year: Never true   Transportation Needs:     Lack of Transportation (Medical): Not on file    Lack of Transportation (Non-Medical):  Not on file   Physical Activity:     Days of Exercise per Week: Not on file    Minutes of Exercise per Session: Not on file   Stress:     Feeling of Stress : Not on file   Social Connections:     Frequency of Communication with Friends and Family: Not on file    Frequency of Social Gatherings with Friends and Family: Not on file    Attends Christianity Services: Not on file    Active Member of Clubs or Organizations: Not on file    Attends Club or Organization Meetings: Not on file    Marital Status: Not on file   Intimate Partner Violence:     Fear of Current or Ex-Partner: Not on file    Emotionally Abused: Not on file    Physically Abused: Not on file    Sexually Abused: Not on file   Housing Stability:     Unable to Pay for Housing in the Last Year: Not on file    Number of Yuri in the Last Year: Not on file    Unstable Housing in the Last Year: Not on file     Prior to Visit Medications    Medication Sig Taking? Authorizing Provider   metFORMIN (GLUCOPHAGE) 500 MG tablet TAKE 1 TABLET BY MOUTH  TWICE DAILY Yes Rin Sharma MD   amLODIPine (NORVASC) 5 MG tablet TAKE 1 TABLET BY MOUTH  DAILY Yes Rin Sharma MD   omeprazole (PRILOSEC) 20 MG delayed release capsule TAKE 1 CAPSULE BY MOUTH IN  THE MORNING BEFORE  BREAKFAST Yes Rin Sharma MD   losartan (COZAAR) 100 MG tablet TAKE 1 TABLET BY MOUTH  DAILY Yes Rin Sharma MD   glipiZIDE (GLUCOTROL) 10 MG tablet TAKE 1 TABLET BY MOUTH  TWICE DAILY BEFORE A MEAL Yes Rin Sharma MD   metoprolol tartrate (LOPRESSOR) 50 MG tablet TAKE 1 AND 1/2 TABLETS BY  MOUTH TWICE DAILY Yes Rin Sharma MD   pioglitazone (ACTOS) 45 MG tablet TAKE 1 TABLET BY MOUTH  DAILY Yes Rin Sharma MD   hydroCHLOROthiazide (HYDRODIURIL) 25 MG tablet Take 1 tablet by mouth daily Yes Rin Sharma MD   vitamin B-12 (CYANOCOBALAMIN) 500 MCG tablet Take 500 mcg by mouth daily. Yes Historical Provider, MD     Allergies   Allergen Reactions    Other Other (See Comments)     PURACOL AG dressing - burning       OBJECTIVE:  Estimated body mass index is 37.6 kg/m² as calculated from the following:    Height as of 11/12/21: 5' 1\" (1.549 m). Weight as of 4/13/21: 199 lb (90.3 kg).   Vitals:    05/10/22 0748 05/10/22 0749 05/10/22 0756   BP: (!) 163/63 (!) 155/59 138/88   Pulse: 76     SpO2: 95% Physical Exam  Vitals and nursing note reviewed. Constitutional:       General: She is not in acute distress. Appearance: She is well-developed. She is not diaphoretic. HENT:      Head: Normocephalic and atraumatic. Right Ear: External ear normal.      Left Ear: External ear normal.      Nose: Nose normal.   Eyes:      General: Lids are normal. No scleral icterus. Right eye: No discharge. Left eye: No discharge. Pupils: Pupils are equal, round, and reactive to light. Neck:      Thyroid: No thyromegaly. Vascular: No JVD. Cardiovascular:      Rate and Rhythm: Normal rate and regular rhythm. Heart sounds: Normal heart sounds. Pulmonary:      Effort: Pulmonary effort is normal. No respiratory distress. Breath sounds: Normal breath sounds. Abdominal:      Palpations: Abdomen is soft. There is no hepatomegaly or splenomegaly. Tenderness: There is no abdominal tenderness. Musculoskeletal:         General: Deformity (Previous fractured ankle) present. Right lower leg: Edema present. Left lower leg: Edema present. Skin:     General: Skin is warm and dry. Coloration: Skin is not pale. Findings: No erythema or rash. Comments: Turgor normal   Neurological:      Mental Status: She is oriented to person, place, and time. Psychiatric:         Mood and Affect: Mood normal.         Behavior: Behavior normal.         Thought Content: Thought content normal.         Judgment: Judgment normal.              ASSESSMENT PLAN      Diagnosis Orders   1. Benign essential hypertension     2. DM (diabetes mellitus), type 2 with peripheral vascular complications (HCC)  Hemoglobin A1C    Vitamin B12 & Folate   3. Venous insufficiency     4. Morbidly obese (HCC)  Vitamin D 25 Hydroxy   5. Body mass index (BMI) 37.0-37.9, adult   Vitamin D 25 Hydroxy   6. Primary osteoarthritis involving multiple joints     7. Mixed hyperlipidemia     8.  Elevated blood pressure reading in office with diagnosis of hypertension     Blood pressure at home tends to run better, patient has an element of whitecoat hypertension will not make any changes. Blood sugar readings by glycosylated hemoglobin or home fingerstick blood sugars are acceptable and no changes in diabetic medication as listed in the medication list are necessary. Her swelling is still significant but stable. 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 Arthritis is bothersome but again she works through it. Lipids will be monitored based upon levels requiring treatment and other cardiac risks. Medications for hyperlipidemia and hypertriglyceridemia as listed on the medication list will be changed as necessary to reach control parameters. Follow-up 6 months    Patient should call the office immediately with new or ongoing signs or symptoms or worsening, or proceed to the emergency room. No changes in past medical history, past surgical history, social history, or family history were noted during the patient encounter unless specifically listed above. All updates of past medical history, past surgical history, social history, or family history were reviewed personally by me during the office visit. All problems listed in the assessment are stable unless noted otherwise. Medication profile reviewed personally by me during the visit. Medication side effects and possible impairments from medications were discussed as applicable. This document was prepared by a combination of typing and transcription through a voice recognition software. I, Dr. Marino Moore, personally performed the services described in this documentation, as scribed by the above signed scribe in my presence, and it is both accurate and complete.  I agree with the ROS and Past Histories independently gathered by the clinical support staff and the remaining scribed note accurately describes my personal service to the patient. 5/10/2022    8:12 AM                Scribe attestation: Lalo Pillai RN, am scribing for and in the presence of Isabell Espinoza MD. Electronically signed by Belkis Rodriguez RN on 5/10/2022 at 7:55 AM      Provider attestation:     I, Dr. Maryan Onofre, personally performed the services described in this documentation, as scribed by the above signed scribe in my presence, and it is both accurate and complete. I agree with the ROS and Past Histories independently gathered by the clinical support staff and the remaining scribed note accurately describes my personal service to the patient.       5/10/2022    8:12 AM

## 2022-05-11 LAB
ESTIMATED AVERAGE GLUCOSE: 157.1 MG/DL
HBA1C MFR BLD: 7.1 %

## 2022-07-07 RX ORDER — HYDROCHLOROTHIAZIDE 25 MG/1
25 TABLET ORAL DAILY
Qty: 90 TABLET | Refills: 0 | Status: SHIPPED | OUTPATIENT
Start: 2022-07-07 | End: 2022-09-26

## 2022-09-26 RX ORDER — HYDROCHLOROTHIAZIDE 25 MG/1
25 TABLET ORAL DAILY
Qty: 90 TABLET | Refills: 0 | Status: SHIPPED | OUTPATIENT
Start: 2022-09-26

## 2022-12-14 RX ORDER — HYDROCHLOROTHIAZIDE 25 MG/1
25 TABLET ORAL DAILY
Qty: 90 TABLET | Refills: 3 | Status: SHIPPED | OUTPATIENT
Start: 2022-12-14

## 2022-12-25 RX ORDER — PIOGLITAZONEHYDROCHLORIDE 45 MG/1
45 TABLET ORAL DAILY
Qty: 90 TABLET | Refills: 3 | OUTPATIENT
Start: 2022-12-25

## 2022-12-25 RX ORDER — LOSARTAN POTASSIUM 100 MG/1
TABLET ORAL
Qty: 90 TABLET | Refills: 3 | OUTPATIENT
Start: 2022-12-25

## 2022-12-25 RX ORDER — GLIPIZIDE 10 MG/1
TABLET ORAL
Qty: 180 TABLET | Refills: 3 | OUTPATIENT
Start: 2022-12-25

## 2022-12-25 RX ORDER — METOPROLOL TARTRATE 50 MG/1
TABLET, FILM COATED ORAL
Qty: 270 TABLET | Refills: 3 | OUTPATIENT
Start: 2022-12-25

## 2023-03-17 DIAGNOSIS — I10 ESSENTIAL HYPERTENSION: ICD-10-CM

## 2023-03-17 DIAGNOSIS — E11.8 TYPE 2 DIABETES MELLITUS WITH COMPLICATION (HCC): ICD-10-CM

## 2023-03-17 DIAGNOSIS — K26.9 DUODENAL ULCER: ICD-10-CM

## 2023-03-20 RX ORDER — AMLODIPINE BESYLATE 5 MG/1
5 TABLET ORAL DAILY
Qty: 90 TABLET | Refills: 0 | Status: SHIPPED | OUTPATIENT
Start: 2023-03-20

## 2023-03-20 RX ORDER — OMEPRAZOLE 20 MG/1
CAPSULE, DELAYED RELEASE ORAL
Qty: 90 CAPSULE | Refills: 0 | Status: SHIPPED | OUTPATIENT
Start: 2023-03-20

## 2023-03-20 NOTE — TELEPHONE ENCOUNTER
Last appt 5/10/2022, no appt scheduled  LMOR to CB and scheduled next avail OV w/PCP for further refills  Routing to [place] due to PCP out of office

## 2023-05-01 ENCOUNTER — OFFICE VISIT (OUTPATIENT)
Dept: FAMILY MEDICINE CLINIC | Age: 83
End: 2023-05-01

## 2023-05-01 VITALS — SYSTOLIC BLOOD PRESSURE: 138 MMHG | HEART RATE: 66 BPM | DIASTOLIC BLOOD PRESSURE: 70 MMHG | OXYGEN SATURATION: 94 %

## 2023-05-01 DIAGNOSIS — M15.9 PRIMARY OSTEOARTHRITIS INVOLVING MULTIPLE JOINTS: ICD-10-CM

## 2023-05-01 DIAGNOSIS — L85.8 CUTANEOUS HORN: ICD-10-CM

## 2023-05-01 DIAGNOSIS — I10 BENIGN ESSENTIAL HYPERTENSION: ICD-10-CM

## 2023-05-01 DIAGNOSIS — E78.2 MIXED HYPERLIPIDEMIA: Primary | ICD-10-CM

## 2023-05-01 DIAGNOSIS — E11.51 DM (DIABETES MELLITUS), TYPE 2 WITH PERIPHERAL VASCULAR COMPLICATIONS (HCC): ICD-10-CM

## 2023-05-01 DIAGNOSIS — K26.9 DUODENAL ULCER: ICD-10-CM

## 2023-05-01 DIAGNOSIS — I87.2 VENOUS INSUFFICIENCY: ICD-10-CM

## 2023-05-01 RX ORDER — MELOXICAM 7.5 MG/1
7.5 TABLET ORAL DAILY
Qty: 90 TABLET | Refills: 0 | Status: SHIPPED | OUTPATIENT
Start: 2023-05-01

## 2023-05-01 RX ORDER — OMEPRAZOLE 40 MG/1
CAPSULE, DELAYED RELEASE ORAL
Qty: 90 CAPSULE | Refills: 0 | Status: SHIPPED | OUTPATIENT
Start: 2023-05-01

## 2023-05-01 SDOH — ECONOMIC STABILITY: FOOD INSECURITY: WITHIN THE PAST 12 MONTHS, THE FOOD YOU BOUGHT JUST DIDN'T LAST AND YOU DIDN'T HAVE MONEY TO GET MORE.: NEVER TRUE

## 2023-05-01 SDOH — ECONOMIC STABILITY: FOOD INSECURITY: WITHIN THE PAST 12 MONTHS, YOU WORRIED THAT YOUR FOOD WOULD RUN OUT BEFORE YOU GOT MONEY TO BUY MORE.: NEVER TRUE

## 2023-05-01 SDOH — ECONOMIC STABILITY: HOUSING INSECURITY
IN THE LAST 12 MONTHS, WAS THERE A TIME WHEN YOU DID NOT HAVE A STEADY PLACE TO SLEEP OR SLEPT IN A SHELTER (INCLUDING NOW)?: NO

## 2023-05-01 SDOH — ECONOMIC STABILITY: INCOME INSECURITY: HOW HARD IS IT FOR YOU TO PAY FOR THE VERY BASICS LIKE FOOD, HOUSING, MEDICAL CARE, AND HEATING?: NOT HARD AT ALL

## 2023-05-01 ASSESSMENT — PATIENT HEALTH QUESTIONNAIRE - PHQ9
2. FEELING DOWN, DEPRESSED OR HOPELESS: 0
SUM OF ALL RESPONSES TO PHQ QUESTIONS 1-9: 0
1. LITTLE INTEREST OR PLEASURE IN DOING THINGS: 0
SUM OF ALL RESPONSES TO PHQ9 QUESTIONS 1 & 2: 0
SUM OF ALL RESPONSES TO PHQ QUESTIONS 1-9: 0

## 2023-05-01 NOTE — PROGRESS NOTES
Chief Complaint   Patient presents with    Hypertension    Diabetes        Internal Administration   First Dose      Second Dose           Last COVID Lab No results found for: SARS-COV-2, SARS-COV-2 RNA, SARS-COV-2, SARS-COV-2, SARS-COV-2 BY PCR, SARS-COV-2, SARS-COV-2, SARS-COV-2          Wt Readings from Last 3 Encounters:   21 199 lb (90.3 kg)   20 199 lb (90.3 kg)   19 200 lb (90.7 kg)     BP Readings from Last 3 Encounters:   23 138/70   05/10/22 138/88   21 138/84      Lab Results   Component Value Date    LABA1C 7.1 05/10/2022    LABA1C 7.0 2021    LABA1C 7.3 2021       HPI:  Savannah Hunter is a 79 y.o. 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 She states that is is painful. Discussed how sometimes these have cancer at the bottom of them. Will refer to general surgery. She states that she has been having a lot of bilateral hand and ankle pain. She is only taking tylenol to help her pain. She states she would really like to be on an arthritic medication again to help her pain. In the past when she got the bleeding ulcer she was on the arthritic medication and aspirin. Will start her on mobic 7.5mg daily and will increase her omeprazole to 40mg to try and protect her stomach. [] Patient has completed an advance directive  [x] Patient has NOT completed an advanced directive  [] Patient has a documented healthcare surrogate  [x] Patient does NOT have a documented healthcare surrogate  [] Discussed the importance of establishing and updating an advanced directive. Patient has questions at this time and those were answered. [x] Discussed the importance of establishing and updating an advanced directive. Patient does NOT have questions at this time.     Discussed with: [x] Patient            [] Family             [] Other caregiver    Patient's medications, allergies, past medical, surgical, social and family histories were

## 2023-05-02 RX ORDER — PIOGLITAZONEHYDROCHLORIDE 45 MG/1
45 TABLET ORAL DAILY
Qty: 90 TABLET | Refills: 3 | Status: SHIPPED | OUTPATIENT
Start: 2023-05-02

## 2023-05-10 ENCOUNTER — NURSE ONLY (OUTPATIENT)
Dept: FAMILY MEDICINE CLINIC | Age: 83
End: 2023-05-10
Payer: MEDICARE

## 2023-05-10 DIAGNOSIS — E11.51 DM (DIABETES MELLITUS), TYPE 2 WITH PERIPHERAL VASCULAR COMPLICATIONS (HCC): ICD-10-CM

## 2023-05-10 DIAGNOSIS — I10 BENIGN ESSENTIAL HYPERTENSION: ICD-10-CM

## 2023-05-10 DIAGNOSIS — E78.2 MIXED HYPERLIPIDEMIA: ICD-10-CM

## 2023-05-10 LAB
ALBUMIN SERPL-MCNC: 4.6 G/DL (ref 3.4–5)
ALBUMIN/GLOB SERPL: 1.4 {RATIO} (ref 1.1–2.2)
ALP SERPL-CCNC: 65 U/L (ref 40–129)
ALT SERPL-CCNC: 11 U/L (ref 10–40)
ANION GAP SERPL CALCULATED.3IONS-SCNC: 13 MMOL/L (ref 3–16)
AST SERPL-CCNC: 13 U/L (ref 15–37)
BASOPHILS # BLD: 0 K/UL (ref 0–0.2)
BASOPHILS NFR BLD: 0.6 %
BILIRUB SERPL-MCNC: <0.2 MG/DL (ref 0–1)
BUN SERPL-MCNC: 31 MG/DL (ref 7–20)
CALCIUM SERPL-MCNC: 9.3 MG/DL (ref 8.3–10.6)
CHLORIDE SERPL-SCNC: 104 MMOL/L (ref 99–110)
CHOLEST SERPL-MCNC: 267 MG/DL (ref 0–199)
CO2 SERPL-SCNC: 26 MMOL/L (ref 21–32)
CREAT SERPL-MCNC: 1.2 MG/DL (ref 0.6–1.2)
DEPRECATED RDW RBC AUTO: 16.7 % (ref 12.4–15.4)
EOSINOPHIL # BLD: 0.3 K/UL (ref 0–0.6)
EOSINOPHIL NFR BLD: 4.6 %
GFR SERPLBLD CREATININE-BSD FMLA CKD-EPI: 45 ML/MIN/{1.73_M2}
GLUCOSE SERPL-MCNC: 115 MG/DL (ref 70–99)
HCT VFR BLD AUTO: 34.5 % (ref 36–48)
HDLC SERPL-MCNC: 51 MG/DL (ref 40–60)
HGB BLD-MCNC: 10.8 G/DL (ref 12–16)
LDLC SERPL CALC-MCNC: 180 MG/DL
LYMPHOCYTES # BLD: 1.8 K/UL (ref 1–5.1)
LYMPHOCYTES NFR BLD: 28.8 %
MCH RBC QN AUTO: 26.7 PG (ref 26–34)
MCHC RBC AUTO-ENTMCNC: 31.4 G/DL (ref 31–36)
MCV RBC AUTO: 85 FL (ref 80–100)
MONOCYTES # BLD: 0.8 K/UL (ref 0–1.3)
MONOCYTES NFR BLD: 12.2 %
NEUTROPHILS # BLD: 3.3 K/UL (ref 1.7–7.7)
NEUTROPHILS NFR BLD: 53.8 %
PLATELET # BLD AUTO: 330 K/UL (ref 135–450)
PMV BLD AUTO: 8.1 FL (ref 5–10.5)
POTASSIUM SERPL-SCNC: 5.1 MMOL/L (ref 3.5–5.1)
PROT SERPL-MCNC: 7.9 G/DL (ref 6.4–8.2)
RBC # BLD AUTO: 4.06 M/UL (ref 4–5.2)
SODIUM SERPL-SCNC: 143 MMOL/L (ref 136–145)
TRIGL SERPL-MCNC: 178 MG/DL (ref 0–150)
VLDLC SERPL CALC-MCNC: 36 MG/DL
WBC # BLD AUTO: 6.2 K/UL (ref 4–11)

## 2023-05-10 PROCEDURE — 36415 COLL VENOUS BLD VENIPUNCTURE: CPT | Performed by: FAMILY MEDICINE

## 2023-05-11 LAB
EST. AVERAGE GLUCOSE BLD GHB EST-MCNC: 162.8 MG/DL
HBA1C MFR BLD: 7.3 %

## 2023-05-31 ENCOUNTER — TELEPHONE (OUTPATIENT)
Dept: FAMILY MEDICINE CLINIC | Age: 83
End: 2023-05-31

## 2023-05-31 NOTE — TELEPHONE ENCOUNTER
Called to schedule patients Medicare AW appointment patient stated that she would only be able to do it on a Monday morning even if only over the phone. It did not look like Dr. Melba Whitman had any Monday mornings available patient asked if this could be done with her visit on Monday 7/10 if not she is not sure if and  when she will be able to do this appointment. I let her know that I would but a note letting everyone know.

## 2023-06-19 LAB
GDT REPLACEMENT: NORMAL
Lab: NORMAL
PATHOLOGY DIAGNOSIS: NORMAL
PATHOLOGY REPORT: NORMAL
SIGNATURE: NORMAL
SPECIMEN: NORMAL

## 2023-06-23 ENCOUNTER — TELEPHONE (OUTPATIENT)
Dept: SURGERY | Age: 83
End: 2023-06-23

## 2023-06-25 DIAGNOSIS — K26.9 DUODENAL ULCER: ICD-10-CM

## 2023-06-26 RX ORDER — OMEPRAZOLE 40 MG/1
CAPSULE, DELAYED RELEASE ORAL
Qty: 90 CAPSULE | Refills: 0 | Status: SHIPPED | OUTPATIENT
Start: 2023-06-26

## 2023-07-02 DIAGNOSIS — M15.9 PRIMARY OSTEOARTHRITIS INVOLVING MULTIPLE JOINTS: ICD-10-CM

## 2023-07-03 RX ORDER — MELOXICAM 7.5 MG/1
7.5 TABLET ORAL DAILY
Qty: 90 TABLET | Refills: 0 | Status: SHIPPED | OUTPATIENT
Start: 2023-07-03

## 2023-07-03 NOTE — TELEPHONE ENCOUNTER
Della Minor is requesting refill(s)   Last OV 5/1/23 (pertaining to medication)  LR 5/1/23 (per medication requested)  Next office visit scheduled or attempted Yes   If no, reason:  11/1/23

## 2023-08-21 ENCOUNTER — TELEMEDICINE (OUTPATIENT)
Dept: FAMILY MEDICINE CLINIC | Age: 83
End: 2023-08-21

## 2023-08-21 DIAGNOSIS — Z00.00 MEDICARE ANNUAL WELLNESS VISIT, SUBSEQUENT: Primary | ICD-10-CM

## 2023-08-21 ASSESSMENT — PATIENT HEALTH QUESTIONNAIRE - PHQ9
SUM OF ALL RESPONSES TO PHQ QUESTIONS 1-9: 0
SUM OF ALL RESPONSES TO PHQ QUESTIONS 1-9: 0
1. LITTLE INTEREST OR PLEASURE IN DOING THINGS: 0
2. FEELING DOWN, DEPRESSED OR HOPELESS: 0
SUM OF ALL RESPONSES TO PHQ QUESTIONS 1-9: 0
SUM OF ALL RESPONSES TO PHQ9 QUESTIONS 1 & 2: 0
SUM OF ALL RESPONSES TO PHQ QUESTIONS 1-9: 0

## 2023-08-21 ASSESSMENT — LIFESTYLE VARIABLES
HOW MANY STANDARD DRINKS CONTAINING ALCOHOL DO YOU HAVE ON A TYPICAL DAY: PATIENT DOES NOT DRINK
HOW OFTEN DO YOU HAVE A DRINK CONTAINING ALCOHOL: NEVER

## 2023-08-21 NOTE — PATIENT INSTRUCTIONS
Personalized Preventive Plan for Della Minor - 8/21/2023  Medicare offers a range of preventive health benefits. Some of the tests and screenings are paid in full while other may be subject to a deductible, co-insurance, and/or copay. Some of these benefits include a comprehensive review of your medical history including lifestyle, illnesses that may run in your family, and various assessments and screenings as appropriate. After reviewing your medical record and screening and assessments performed today your provider may have ordered immunizations, labs, imaging, and/or referrals for you. A list of these orders (if applicable) as well as your Preventive Care list are included within your After Visit Summary for your review. Other Preventive Recommendations:    A preventive eye exam performed by an eye specialist is recommended every 1-2 years to screen for glaucoma; cataracts, macular degeneration, and other eye disorders. A preventive dental visit is recommended every 6 months. Try to get at least 150 minutes of exercise per week or 10,000 steps per day on a pedometer . Order or download the FREE \"Exercise & Physical Activity: Your Everyday Guide\" from The [employer]. Call 6-467.347.4209 or search The [employer] online. You need 8422-2895 mg of calcium and 9487-2825 IU of vitamin D per day. It is possible to meet your calcium requirement with diet alone, but a vitamin D supplement is usually necessary to meet this goal.  When exposed to the sun, use a sunscreen that protects against both UVA and UVB radiation with an SPF of 30 or greater. Reapply every 2 to 3 hours or after sweating, drying off with a towel, or swimming. Always wear a seat belt when traveling in a car. Always wear a helmet when riding a bicycle or motorcycle.

## 2023-08-21 NOTE — PROGRESS NOTES
Comments)     PURACOL AG dressing - burning     Prior to Visit Medications    Medication Sig Taking? Authorizing Provider   meloxicam (MOBIC) 7.5 MG tablet TAKE 1 TABLET BY MOUTH DAILY  SHORTY Schulte - CNP   omeprazole (PRILOSEC) 40 MG delayed release capsule TAKE 1 CAPSULE BY MOUTH IN THE  MORNING BEFORE BREAKFAST  Isis Forbes MD   metFORMIN (GLUCOPHAGE) 500 MG tablet TAKE 1 TABLET BY MOUTH TWICE  DAILY  Isis Forbes MD   amLODIPine (NORVASC) 5 MG tablet TAKE 1 TABLET BY MOUTH DAILY  Isis Forbes MD   pioglitazone (ACTOS) 45 MG tablet TAKE 1 TABLET BY MOUTH  DAILY  Isis Forbes MD   hydroCHLOROthiazide (HYDRODIURIL) 25 MG tablet TAKE 1 TABLET BY MOUTH  DAILY  Isis Forbes MD   losartan (COZAAR) 100 MG tablet TAKE 1 TABLET BY MOUTH  DAILY  Isis Forbes MD   glipiZIDE (GLUCOTROL) 10 MG tablet TAKE 1 TABLET BY MOUTH  TWICE DAILY BEFORE A MEAL  Isis Forbes MD   metoprolol tartrate (LOPRESSOR) 50 MG tablet TAKE 1 AND 1/2 TABLETS BY  MOUTH TWICE DAILY  Isis Forbes MD   vitamin B-12 (CYANOCOBALAMIN) 500 MCG tablet Take 1 tablet by mouth daily  Historical Provider, MD Fitzpatrick (Including outside providers/suppliers regularly involved in providing care):   Patient Care Team:  Isis Forbes MD as PCP - General  Isis Forbes MD as PCP - Empaneled Provider     Reviewed and updated this visit:  Tobacco  Allergies  Meds  Med Hx  Surg Hx  Soc Hx  Fam Hx      Sera Kilpatrick LPN, [id number], performed the documented evaluation under the direct supervision of the attending physician. Della Minor, was evaluated through a synchronous (real-time) telephone encounter. The patient (or guardian if applicable) is aware that this is a billable service, which includes applicable co-pays. This Virtual Visit was conducted with patient's (and/or legal guardian's) consent.

## 2023-08-31 RX ORDER — LOSARTAN POTASSIUM 100 MG/1
TABLET ORAL
Qty: 90 TABLET | Refills: 3 | Status: SHIPPED | OUTPATIENT
Start: 2023-08-31

## 2023-08-31 NOTE — TELEPHONE ENCOUNTER
Refill Request     CONFIRM preferrred pharmacy with the patient. If Mail Order Rx - Pend for 90 day refill. Last Seen: Last Seen Department: 8/21/2023  Last Seen by PCP: 8/21/2023    Last Written: 2-    If no future appointment scheduled, route STAFF MESSAGE with patient name to the [facility] for scheduling. Next Appointment:   Future Appointments   Date Time Provider [address] [place] [address]   11/1/2023  7:00 AM [facility] [place] [facility]       Message sent to  to schedule appt with patient?   N/A      Requested Prescriptions     Pending Prescriptions Disp Refills    losartan (COZAAR) 100 MG tablet [Pharmacy Med Name: Losartan Potassium 100 MG Oral Tablet] 90 tablet 3     Sig: TAKE 1 TABLET BY MOUTH  DAILY

## 2023-09-18 DIAGNOSIS — K26.9 DUODENAL ULCER: ICD-10-CM

## 2023-09-19 RX ORDER — OMEPRAZOLE 40 MG/1
CAPSULE, DELAYED RELEASE ORAL
Qty: 90 CAPSULE | Refills: 3 | Status: SHIPPED | OUTPATIENT
Start: 2023-09-19

## 2023-09-19 NOTE — TELEPHONE ENCOUNTER
Refill Request     CONFIRM preferrred pharmacy with the patient. If Mail Order Rx - Pend for 90 day refill. Last Seen: Last Seen Department: 8/21/2023  Last Seen by PCP: 8/21/2023    Last Written: 6-    If no future appointment scheduled, route STAFF MESSAGE with patient name to the [facility] for scheduling. Next Appointment:   Future Appointments   Date Time Provider [address] [place] [address]   11/8/2023  7:00 AM Cara Brito MD [facility]  [place] [facility]       Message sent to  to schedule appt with patient?   N/A      Requested Prescriptions     Pending Prescriptions Disp Refills    omeprazole (PRILOSEC) 40 MG delayed release capsule [Pharmacy Med Name: Omeprazole 40 MG Oral Capsule Delayed Release] 90 capsule 3     Sig: TAKE 1 CAPSULE BY MOUTH IN THE  MORNING BEFORE BREAKFAST

## 2023-11-01 DIAGNOSIS — M15.9 PRIMARY OSTEOARTHRITIS INVOLVING MULTIPLE JOINTS: ICD-10-CM

## 2023-11-01 RX ORDER — MELOXICAM 7.5 MG/1
7.5 TABLET ORAL DAILY
Qty: 90 TABLET | Refills: 3 | Status: SHIPPED | OUTPATIENT
Start: 2023-11-01

## 2023-11-01 NOTE — TELEPHONE ENCOUNTER
Refill Request     CONFIRM preferrred pharmacy with the patient. If Mail Order Rx - Pend for 90 day refill. Last Seen: Last Seen Department: 8/21/2023  Last Seen by PCP: Visit date not found    Last Written: 7/3/23    If no future appointment scheduled, route STAFF MESSAGE with patient name to the [facility] for scheduling. Next Appointment:   Future Appointments   Date Time Provider [address] [place] [address]   11/8/2023  7:00 AM Aneudy Mandujano MD [facility] [address]       Message sent to [address] [place] [address] to schedule appt with patient?   N/A      Requested Prescriptions     Pending Prescriptions Disp Refills    meloxicam (MOBIC) 7.5 MG tablet [Pharmacy Med Name: Meloxicam 7.5 MG Oral Tablet] 90 tablet 3     Sig: TAKE 1 TABLET BY MOUTH DAILY

## 2023-11-08 ENCOUNTER — OFFICE VISIT (OUTPATIENT)
Dept: FAMILY MEDICINE CLINIC | Age: 83
End: 2023-11-08

## 2023-11-08 VITALS — OXYGEN SATURATION: 96 % | HEART RATE: 86 BPM | SYSTOLIC BLOOD PRESSURE: 136 MMHG | DIASTOLIC BLOOD PRESSURE: 77 MMHG

## 2023-11-08 DIAGNOSIS — M15.9 PRIMARY OSTEOARTHRITIS INVOLVING MULTIPLE JOINTS: ICD-10-CM

## 2023-11-08 DIAGNOSIS — I10 BENIGN ESSENTIAL HYPERTENSION: ICD-10-CM

## 2023-11-08 DIAGNOSIS — E11.9 TYPE 2 DIABETES MELLITUS WITHOUT COMPLICATION, UNSPECIFIED WHETHER LONG TERM INSULIN USE (HCC): Primary | ICD-10-CM

## 2023-11-08 DIAGNOSIS — I87.2 VENOUS INSUFFICIENCY: ICD-10-CM

## 2023-11-08 LAB
ALBUMIN SERPL-MCNC: 4.4 G/DL (ref 3.4–5)
ALBUMIN/GLOB SERPL: 1.6 {RATIO} (ref 1.1–2.2)
ALP SERPL-CCNC: 60 U/L (ref 40–129)
ALT SERPL-CCNC: 10 U/L (ref 10–40)
ANION GAP SERPL CALCULATED.3IONS-SCNC: 12 MMOL/L (ref 3–16)
AST SERPL-CCNC: 13 U/L (ref 15–37)
BASOPHILS # BLD: 0.1 K/UL (ref 0–0.2)
BASOPHILS NFR BLD: 1.3 %
BILIRUB SERPL-MCNC: <0.2 MG/DL (ref 0–1)
BUN SERPL-MCNC: 33 MG/DL (ref 7–20)
CALCIUM SERPL-MCNC: 8.8 MG/DL (ref 8.3–10.6)
CHLORIDE SERPL-SCNC: 107 MMOL/L (ref 99–110)
CO2 SERPL-SCNC: 22 MMOL/L (ref 21–32)
CREAT SERPL-MCNC: 1.3 MG/DL (ref 0.6–1.2)
DEPRECATED RDW RBC AUTO: 15.1 % (ref 12.4–15.4)
EOSINOPHIL # BLD: 0.3 K/UL (ref 0–0.6)
EOSINOPHIL NFR BLD: 4 %
GFR SERPLBLD CREATININE-BSD FMLA CKD-EPI: 40 ML/MIN/{1.73_M2}
GLUCOSE SERPL-MCNC: 88 MG/DL (ref 70–99)
HCT VFR BLD AUTO: 32.2 % (ref 36–48)
HGB BLD-MCNC: 10.7 G/DL (ref 12–16)
LYMPHOCYTES # BLD: 2 K/UL (ref 1–5.1)
LYMPHOCYTES NFR BLD: 30.5 %
MCH RBC QN AUTO: 28.3 PG (ref 26–34)
MCHC RBC AUTO-ENTMCNC: 33.4 G/DL (ref 31–36)
MCV RBC AUTO: 84.7 FL (ref 80–100)
MONOCYTES # BLD: 0.8 K/UL (ref 0–1.3)
MONOCYTES NFR BLD: 11.6 %
NEUTROPHILS # BLD: 3.4 K/UL (ref 1.7–7.7)
NEUTROPHILS NFR BLD: 52.6 %
PLATELET # BLD AUTO: 335 K/UL (ref 135–450)
PMV BLD AUTO: 8.1 FL (ref 5–10.5)
POTASSIUM SERPL-SCNC: 4.2 MMOL/L (ref 3.5–5.1)
PROT SERPL-MCNC: 7.2 G/DL (ref 6.4–8.2)
RBC # BLD AUTO: 3.8 M/UL (ref 4–5.2)
SODIUM SERPL-SCNC: 141 MMOL/L (ref 136–145)
WBC # BLD AUTO: 6.5 K/UL (ref 4–11)

## 2023-11-08 RX ORDER — MELOXICAM 15 MG/1
15 TABLET ORAL DAILY
Qty: 90 TABLET | Refills: 0 | Status: SHIPPED | OUTPATIENT
Start: 2023-11-08

## 2023-11-08 NOTE — PROGRESS NOTES
No discharge. Left eye: No discharge. Pupils: Pupils are equal, round, and reactive to light. Neck:      Thyroid: No thyromegaly. Vascular: No JVD. Cardiovascular:      Rate and Rhythm: Normal rate and regular rhythm. Heart sounds: Normal heart sounds. Pulmonary:      Effort: Pulmonary effort is normal. No respiratory distress. Breath sounds: Normal breath sounds. Abdominal:      Palpations: Abdomen is soft. There is no hepatomegaly or splenomegaly. Tenderness: There is no abdominal tenderness. Musculoskeletal:      Right lower leg: Edema (3+) present. Left lower leg: Edema (3+) present. Skin:     General: Skin is warm and dry. Coloration: Skin is not pale. Findings: No erythema or rash. Comments: Turgor normal   Neurological:      Mental Status: She is oriented to person, place, and time. Psychiatric:         Mood and Affect: Mood normal.         Behavior: Behavior normal.         Thought Content: Thought content normal.         Judgment: Judgment normal.              ASSESSMENT PLAN      Diagnosis Orders   1. Type 2 diabetes mellitus without complication, unspecified whether long term insulin use (HCC)  Comprehensive Metabolic Panel    CBC with Auto Differential    Hemoglobin A1C      2. Primary osteoarthritis involving multiple joints  meloxicam (MOBIC) 15 MG tablet      3. Venous insufficiency        4. Benign essential hypertension        Blood sugar readings by glycosylated hemoglobin or home fingerstick blood sugars are acceptable and no changes in diabetic medication as listed in the medication list are necessary. Current blood pressure readings in the office or at home are acceptable and current antihypertensive medications as listed in the medication list require no change. Leg swelling at baseline. She still has significant pain in her joints especially after periods of inactivity. We will cautiously increase meloxicam to 15 mg daily.

## 2023-11-08 NOTE — PATIENT INSTRUCTIONS

## 2023-11-09 LAB
EST. AVERAGE GLUCOSE BLD GHB EST-MCNC: 162.8 MG/DL
HBA1C MFR BLD: 7.3 %

## 2023-11-20 RX ORDER — HYDROCHLOROTHIAZIDE 25 MG/1
25 TABLET ORAL DAILY
Qty: 90 TABLET | Refills: 3 | Status: SHIPPED | OUTPATIENT
Start: 2023-11-20

## 2023-11-20 NOTE — TELEPHONE ENCOUNTER
Refill Request     CONFIRM preferrred pharmacy with the patient. If Mail Order Rx - Pend for 90 day refill. Last Seen: Last Seen Department: 11/8/2023  Last Seen by PCP: 11/8/2023    Last Written: 12/14/22    If no future appointment scheduled, route STAFF MESSAGE with patient name to the [facility] for scheduling. Next Appointment:   Future Appointments   Date Time Provider [address] [place] [address]   5/14/2024  7:00 AM Will Weldon [facility]       Message sent to [address] [place] [address] to schedule appt with patient?   NO      Requested Prescriptions     Pending Prescriptions Disp Refills    hydroCHLOROthiazide (HYDRODIURIL) 25 MG tablet [Pharmacy Med Name: hydroCHLOROthiazide 25 MG Oral Tablet] 90 tablet 3     Sig: TAKE 1 TABLET BY MOUTH DAILY

## 2024-01-18 RX ORDER — GLIPIZIDE 10 MG/1
TABLET ORAL
Qty: 180 TABLET | Refills: 3 | Status: SHIPPED | OUTPATIENT
Start: 2024-01-18

## 2024-01-18 NOTE — TELEPHONE ENCOUNTER
Refill Request     CONFIRM preferrred pharmacy with the patient.    If Mail Order Rx - Pend for 90 day refill.      Last Seen: Last Seen Department: 11/8/2023  Last Seen by PCP: 11/8/2023    Last Written: 11/8/23    If no future appointment scheduled, route STAFF MESSAGE with patient name to the  Pool for scheduling.      Next Appointment:   Future Appointments   Date Time Provider Department Center   5/14/2024  7:00 AM Eladio Osei MD [facility] [place] [facility]       Message sent to  to schedule appt with patient?  N/A      Requested Prescriptions     Pending Prescriptions Disp Refills    glipiZIDE (GLUCOTROL) 10 MG tablet [Pharmacy Med Name: glipiZIDE 10 MG Oral Tablet] 180 tablet 3     Sig: TAKE 1 TABLET BY MOUTH  TWICE DAILY BEFORE A MEAL

## 2024-03-04 RX ORDER — METOPROLOL TARTRATE 50 MG/1
TABLET, FILM COATED ORAL
Qty: 270 TABLET | Refills: 3 | Status: SHIPPED | OUTPATIENT
Start: 2024-03-04

## 2024-03-07 RX ORDER — PIOGLITAZONEHYDROCHLORIDE 45 MG/1
45 TABLET ORAL DAILY
Qty: 90 TABLET | Refills: 0 | Status: SHIPPED | OUTPATIENT
Start: 2024-03-07

## 2024-03-07 NOTE — TELEPHONE ENCOUNTER
Refill Request     CONFIRM preferrred pharmacy with the patient.    If Mail Order Rx - Pend for 90 day refill.      Last Seen: Last Seen Department: 11/8/2023  Last Seen by PCP: 11/8/2023    Last Written: 5-2-2023    If no future appointment scheduled, route STAFF MESSAGE with patient name to the  Pool for scheduling.      Next Appointment:   Future Appointments   Date Time Provider Department Center   5/14/2024  7:00 AM El[facility]io [facility] [place] [facility]       Message sent to  to schedule appt with patient?  N/A      Requested Prescriptions     Pending Prescriptions Disp Refills    pioglitazone (ACTOS) 45 MG tablet [Pharmacy Med Name: Pioglitazone HCl 45 MG Oral Tablet] 90 tablet 3     Sig: TAKE 1 TABLET BY MOUTH DAILY

## 2024-03-20 DIAGNOSIS — M15.9 PRIMARY OSTEOARTHRITIS INVOLVING MULTIPLE JOINTS: ICD-10-CM

## 2024-03-20 RX ORDER — MELOXICAM 15 MG/1
15 TABLET ORAL DAILY
Qty: 90 TABLET | Refills: 3 | Status: SHIPPED | OUTPATIENT
Start: 2024-03-20

## 2024-05-08 DIAGNOSIS — I10 ESSENTIAL HYPERTENSION: ICD-10-CM

## 2024-05-08 DIAGNOSIS — E11.8 TYPE 2 DIABETES MELLITUS WITH COMPLICATION (HCC): ICD-10-CM

## 2024-05-09 RX ORDER — AMLODIPINE BESYLATE 5 MG/1
5 TABLET ORAL DAILY
Qty: 90 TABLET | Refills: 3 | Status: SHIPPED | OUTPATIENT
Start: 2024-05-09

## 2024-05-09 RX ORDER — PIOGLITAZONEHYDROCHLORIDE 45 MG/1
45 TABLET ORAL DAILY
Qty: 90 TABLET | Refills: 3 | Status: SHIPPED | OUTPATIENT
Start: 2024-05-09

## 2024-05-09 NOTE — TELEPHONE ENCOUNTER
Refill Request     CONFIRM preferrred pharmacy with the patient.    If Mail Order Rx - Pend for 90 day refill.      Last Seen: Last Seen Department: 11/8/2023  Last Seen by PCP: Visit date not found    Last Written: 3-7-2024    If no future appointment scheduled, route STAFF MESSAGE with patient name to the  Pool for scheduling.      Next Appointment:   Future Appointments   Date Time Provider Department Center   5/14/2024  7:00 AM Eladio Osei MD [facility] [place] [facility]       Message sent to  to schedule appt with patient?  N/A      Requested Prescriptions     Pending Prescriptions Disp Refills    pioglitazone (ACTOS) 45 MG tablet [Pharmacy Med Name: Pioglitazone HCl 45 MG Oral Tablet] 90 tablet 3     Sig: TAKE 1 TABLET BY MOUTH DAILY

## 2024-05-09 NOTE — TELEPHONE ENCOUNTER
Refill Request     CONFIRM preferrred pharmacy with the patient.    If Mail Order Rx - Pend for 90 day refill.      Last Seen: Last Seen Department: 11/8/2023  Last Seen by PCP: 11/8/2023    Last Written: 6-    If no future appointment scheduled, route STAFF MESSAGE with patient name to the  Pool for scheduling.      Next Appointment:   Future Appointments   Date Time Provider Department Center   5/14/2024  7:00 AM Eladio Osei MD [facility] [place] [facility]       Message sent to  to schedule appt with patient?  N/A      Requested Prescriptions     Pending Prescriptions Disp Refills    amLODIPine (NORVASC) 5 MG tablet [Pharmacy Med Name: amLODIPine Besylate 5 MG Oral Tablet] 90 tablet 3     Sig: TAKE 1 TABLET BY MOUTH DAILY    metFORMIN (GLUCOPHAGE) 500 MG tablet [Pharmacy Med Name: metFORMIN HCl 500 MG Oral Tablet] 180 tablet 3     Sig: TAKE 1 TABLET BY MOUTH TWICE  DAILY

## 2024-06-24 ENCOUNTER — OFFICE VISIT (OUTPATIENT)
Dept: FAMILY MEDICINE CLINIC | Age: 84
End: 2024-06-24
Payer: MEDICARE

## 2024-06-24 VITALS — HEART RATE: 72 BPM | SYSTOLIC BLOOD PRESSURE: 131 MMHG | DIASTOLIC BLOOD PRESSURE: 67 MMHG | OXYGEN SATURATION: 91 %

## 2024-06-24 DIAGNOSIS — I10 BENIGN ESSENTIAL HYPERTENSION: Primary | ICD-10-CM

## 2024-06-24 DIAGNOSIS — M15.9 PRIMARY OSTEOARTHRITIS INVOLVING MULTIPLE JOINTS: ICD-10-CM

## 2024-06-24 DIAGNOSIS — E55.9 VITAMIN D DEFICIENCY: ICD-10-CM

## 2024-06-24 DIAGNOSIS — I87.2 VENOUS INSUFFICIENCY: ICD-10-CM

## 2024-06-24 DIAGNOSIS — E11.51 DM (DIABETES MELLITUS), TYPE 2 WITH PERIPHERAL VASCULAR COMPLICATIONS (HCC): ICD-10-CM

## 2024-06-24 PROCEDURE — G2211 COMPLEX E/M VISIT ADD ON: HCPCS | Performed by: FAMILY MEDICINE

## 2024-06-24 PROCEDURE — 99214 OFFICE O/P EST MOD 30 MIN: CPT | Performed by: FAMILY MEDICINE

## 2024-06-24 PROCEDURE — G8421 BMI NOT CALCULATED: HCPCS | Performed by: FAMILY MEDICINE

## 2024-06-24 PROCEDURE — 3078F DIAST BP <80 MM HG: CPT | Performed by: FAMILY MEDICINE

## 2024-06-24 PROCEDURE — G8427 DOCREV CUR MEDS BY ELIG CLIN: HCPCS | Performed by: FAMILY MEDICINE

## 2024-06-24 PROCEDURE — 1036F TOBACCO NON-USER: CPT | Performed by: FAMILY MEDICINE

## 2024-06-24 PROCEDURE — 1090F PRES/ABSN URINE INCON ASSESS: CPT | Performed by: FAMILY MEDICINE

## 2024-06-24 PROCEDURE — G8400 PT W/DXA NO RESULTS DOC: HCPCS | Performed by: FAMILY MEDICINE

## 2024-06-24 PROCEDURE — 1123F ACP DISCUSS/DSCN MKR DOCD: CPT | Performed by: FAMILY MEDICINE

## 2024-06-24 PROCEDURE — 3075F SYST BP GE 130 - 139MM HG: CPT | Performed by: FAMILY MEDICINE

## 2024-06-24 SDOH — ECONOMIC STABILITY: FOOD INSECURITY: WITHIN THE PAST 12 MONTHS, THE FOOD YOU BOUGHT JUST DIDN'T LAST AND YOU DIDN'T HAVE MONEY TO GET MORE.: NEVER TRUE

## 2024-06-24 SDOH — ECONOMIC STABILITY: FOOD INSECURITY: WITHIN THE PAST 12 MONTHS, YOU WORRIED THAT YOUR FOOD WOULD RUN OUT BEFORE YOU GOT MONEY TO BUY MORE.: NEVER TRUE

## 2024-06-24 SDOH — ECONOMIC STABILITY: INCOME INSECURITY: HOW HARD IS IT FOR YOU TO PAY FOR THE VERY BASICS LIKE FOOD, HOUSING, MEDICAL CARE, AND HEATING?: NOT HARD AT ALL

## 2024-06-24 ASSESSMENT — PATIENT HEALTH QUESTIONNAIRE - PHQ9
2. FEELING DOWN, DEPRESSED OR HOPELESS: NOT AT ALL
SUM OF ALL RESPONSES TO PHQ QUESTIONS 1-9: 0
1. LITTLE INTEREST OR PLEASURE IN DOING THINGS: NOT AT ALL
SUM OF ALL RESPONSES TO PHQ9 QUESTIONS 1 & 2: 0
SUM OF ALL RESPONSES TO PHQ QUESTIONS 1-9: 0

## 2024-06-24 NOTE — PROGRESS NOTES
Chief Complaint   Patient presents with    Hypertension    Diabetes        Internal Administration   First Dose      Second Dose           Last COVID Lab POC Glucose (mg/dl)   Date Value   2019 127 (H)             Wt Readings from Last 3 Encounters:   21 90.3 kg (199 lb)   20 90.3 kg (199 lb)   19 90.7 kg (200 lb)     BP Readings from Last 3 Encounters:   24 131/67   23 136/77   23 138/70      Lab Results   Component Value Date    LABA1C 7.3 2023    LABA1C 7.3 05/10/2023    LABA1C 7.1 05/10/2022       HPI:  Della Minor is a 84 y.o. (: 1940) here today for    She states that overall she has been doing well. She has noticed that she has been slowing down a bit. 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 Discussed her continued ankle pain and she didn't feel meloxicam helped. Discussed she could be referred to orthopedics to look at injections and she brought up pain management but isnt sure she wants to do either of these. She is [facility]    [x] Patient has completed an advance directive  [] Patient has NOT completed an advanced directive  [x] Patient has a documented healthcare surrogate  [] Patient does NOT have a documented healthcare surrogate  [] Discussed the importance of establishing and updating an advanced directive.  Patient has questions at this time and those were answered.  [x] Discussed the importance of establishing and updating an advanced directive.  Patient does NOT have questions at this time.    Discussed with: [x] Patient            [] Family             [] Other caregiver    Patient's medications, allergies, past medical, surgical, social and family histories were reviewed and updated asappropriate on 2024 at 3:59 PM.    ROS:  Review of Systems    All other systems reviewed and are negative except as noted above on 2024 at

## 2024-06-28 ENCOUNTER — NURSE ONLY (OUTPATIENT)
Dept: FAMILY MEDICINE CLINIC | Age: 84
End: 2024-06-28
Payer: MEDICARE

## 2024-06-28 DIAGNOSIS — E11.51 DM (DIABETES MELLITUS), TYPE 2 WITH PERIPHERAL VASCULAR COMPLICATIONS (HCC): ICD-10-CM

## 2024-06-28 DIAGNOSIS — I10 BENIGN ESSENTIAL HYPERTENSION: ICD-10-CM

## 2024-06-28 DIAGNOSIS — E55.9 VITAMIN D DEFICIENCY: ICD-10-CM

## 2024-06-28 LAB
25(OH)D3 SERPL-MCNC: 29.7 NG/ML
ALBUMIN SERPL-MCNC: 4.1 G/DL (ref 3.4–5)
ALBUMIN/GLOB SERPL: 1.2 {RATIO} (ref 1.1–2.2)
ALP SERPL-CCNC: 67 U/L (ref 40–129)
ALT SERPL-CCNC: 8 U/L (ref 10–40)
ANION GAP SERPL CALCULATED.3IONS-SCNC: 15 MMOL/L (ref 3–16)
AST SERPL-CCNC: 12 U/L (ref 15–37)
BASOPHILS # BLD: 0.1 K/UL (ref 0–0.2)
BASOPHILS NFR BLD: 1 %
BILIRUB SERPL-MCNC: <0.2 MG/DL (ref 0–1)
BUN SERPL-MCNC: 29 MG/DL (ref 7–20)
CALCIUM SERPL-MCNC: 9.2 MG/DL (ref 8.3–10.6)
CHLORIDE SERPL-SCNC: 103 MMOL/L (ref 99–110)
CHOLEST SERPL-MCNC: 260 MG/DL (ref 0–199)
CO2 SERPL-SCNC: 21 MMOL/L (ref 21–32)
CREAT SERPL-MCNC: 1.2 MG/DL (ref 0.6–1.2)
CREAT UR-MCNC: 53.2 MG/DL (ref 28–259)
DEPRECATED RDW RBC AUTO: 15.3 % (ref 12.4–15.4)
EOSINOPHIL # BLD: 0.2 K/UL (ref 0–0.6)
EOSINOPHIL NFR BLD: 3.7 %
FOLATE SERPL-MCNC: 13.38 NG/ML (ref 4.78–24.2)
GFR SERPLBLD CREATININE-BSD FMLA CKD-EPI: 44 ML/MIN/{1.73_M2}
GLUCOSE SERPL-MCNC: 109 MG/DL (ref 70–99)
HCT VFR BLD AUTO: 30.6 % (ref 36–48)
HDLC SERPL-MCNC: 47 MG/DL (ref 40–60)
HGB BLD-MCNC: 10.4 G/DL (ref 12–16)
LDLC SERPL CALC-MCNC: 175 MG/DL
LYMPHOCYTES # BLD: 1.8 K/UL (ref 1–5.1)
LYMPHOCYTES NFR BLD: 29.2 %
MCH RBC QN AUTO: 28.3 PG (ref 26–34)
MCHC RBC AUTO-ENTMCNC: 33.9 G/DL (ref 31–36)
MCV RBC AUTO: 83.5 FL (ref 80–100)
MICROALBUMIN UR DL<=1MG/L-MCNC: <1.2 MG/DL
MICROALBUMIN/CREAT UR: NORMAL MG/G (ref 0–30)
MONOCYTES # BLD: 0.8 K/UL (ref 0–1.3)
MONOCYTES NFR BLD: 12.5 %
NEUTROPHILS # BLD: 3.3 K/UL (ref 1.7–7.7)
NEUTROPHILS NFR BLD: 53.6 %
PLATELET # BLD AUTO: 310 K/UL (ref 135–450)
PMV BLD AUTO: 8.1 FL (ref 5–10.5)
POTASSIUM SERPL-SCNC: 4.7 MMOL/L (ref 3.5–5.1)
PROT SERPL-MCNC: 7.5 G/DL (ref 6.4–8.2)
RBC # BLD AUTO: 3.67 M/UL (ref 4–5.2)
SODIUM SERPL-SCNC: 139 MMOL/L (ref 136–145)
TRIGL SERPL-MCNC: 191 MG/DL (ref 0–150)
VIT B12 SERPL-MCNC: 343 PG/ML (ref 211–911)
VLDLC SERPL CALC-MCNC: 38 MG/DL
WBC # BLD AUTO: 6.2 K/UL (ref 4–11)

## 2024-06-28 PROCEDURE — 36415 COLL VENOUS BLD VENIPUNCTURE: CPT | Performed by: FAMILY MEDICINE

## 2024-06-29 LAB
EST. AVERAGE GLUCOSE BLD GHB EST-MCNC: 168.6 MG/DL
HBA1C MFR BLD: 7.5 %

## 2024-07-11 RX ORDER — LOSARTAN POTASSIUM 100 MG/1
TABLET ORAL
Qty: 90 TABLET | Refills: 3 | Status: SHIPPED | OUTPATIENT
Start: 2024-07-11

## 2024-07-11 NOTE — TELEPHONE ENCOUNTER
Refill Request     CONFIRM preferrred pharmacy with the patient.    If Mail Order Rx - Pend for 90 day refill.      Last Seen: Last Seen Department: 6/24/2024  Last Seen by PCP: 6/24/2024    Last Written: 8/31/23    If no future appointment scheduled, route STAFF MESSAGE with patient name to the  Pool for scheduling.      Next Appointment:   No future appointments.    Message sent to  to schedule appt with patient?  N/A      Requested Prescriptions     Pending Prescriptions Disp Refills    losartan (COZAAR) 100 MG tablet [Pharmacy Med Name: Losartan Potassium 100 MG Oral Tablet] 90 tablet 3     Sig: TAKE 1 TABLET BY MOUTH  DAILY

## 2024-07-16 ENCOUNTER — APPOINTMENT (OUTPATIENT)
Dept: GENERAL RADIOLOGY | Age: 84
End: 2024-07-16
Payer: MEDICARE

## 2024-07-16 ENCOUNTER — HOSPITAL ENCOUNTER (EMERGENCY)
Age: 84
Discharge: ANOTHER HOSPITAL | End: 2024-07-16
Attending: EMERGENCY MEDICINE
Payer: MEDICARE

## 2024-07-16 ENCOUNTER — HOSPITAL ENCOUNTER (INPATIENT)
Age: 84
LOS: 3 days | Discharge: HOME | DRG: 378 | End: 2024-07-19
Attending: INTERNAL MEDICINE | Admitting: INTERNAL MEDICINE
Payer: MEDICARE

## 2024-07-16 VITALS
OXYGEN SATURATION: 98 % | DIASTOLIC BLOOD PRESSURE: 74 MMHG | BODY MASS INDEX: 37.33 KG/M2 | TEMPERATURE: 97.5 F | SYSTOLIC BLOOD PRESSURE: 118 MMHG | HEART RATE: 89 BPM | RESPIRATION RATE: 20 BRPM | HEIGHT: 61 IN | WEIGHT: 197.7 LBS

## 2024-07-16 DIAGNOSIS — N17.9 AKI (ACUTE KIDNEY INJURY) (HCC): ICD-10-CM

## 2024-07-16 DIAGNOSIS — D64.9 ANEMIA, UNSPECIFIED TYPE: ICD-10-CM

## 2024-07-16 DIAGNOSIS — R79.89 ELEVATED TROPONIN: Primary | ICD-10-CM

## 2024-07-16 DIAGNOSIS — K64.9 HEMORRHOIDS, UNSPECIFIED HEMORRHOID TYPE: ICD-10-CM

## 2024-07-16 DIAGNOSIS — K92.2 GASTROINTESTINAL HEMORRHAGE, UNSPECIFIED GASTROINTESTINAL HEMORRHAGE TYPE: Primary | ICD-10-CM

## 2024-07-16 DIAGNOSIS — K92.1 BLACK STOOL: ICD-10-CM

## 2024-07-16 DIAGNOSIS — I47.10 SVT (SUPRAVENTRICULAR TACHYCARDIA) (HCC): ICD-10-CM

## 2024-07-16 DIAGNOSIS — I21.4 NSTEMI (NON-ST ELEVATED MYOCARDIAL INFARCTION) (HCC): ICD-10-CM

## 2024-07-16 DIAGNOSIS — R00.0 TACHYCARDIA: ICD-10-CM

## 2024-07-16 DIAGNOSIS — I48.91 NEW ONSET ATRIAL FIBRILLATION (HCC): ICD-10-CM

## 2024-07-16 LAB
ABO + RH BLD: NORMAL
ALBUMIN SERPL-MCNC: 4.1 G/DL (ref 3.4–5)
ALBUMIN/GLOB SERPL: 1.1 {RATIO} (ref 1.1–2.2)
ALP SERPL-CCNC: 72 U/L (ref 40–129)
ALT SERPL-CCNC: 11 U/L (ref 10–40)
ANION GAP SERPL CALCULATED.3IONS-SCNC: 18 MMOL/L (ref 3–16)
AST SERPL-CCNC: 13 U/L (ref 15–37)
BACTERIA URNS QL MICRO: ABNORMAL /HPF
BASE EXCESS BLDV CALC-SCNC: -3.3 MMOL/L (ref -3–3)
BASOPHILS # BLD: 0.1 K/UL (ref 0–0.2)
BASOPHILS NFR BLD: 1.2 %
BILIRUB SERPL-MCNC: 0.3 MG/DL (ref 0–1)
BILIRUB UR QL STRIP.AUTO: NEGATIVE
BLD GP AB SCN SERPL QL: NORMAL
BUN SERPL-MCNC: 32 MG/DL (ref 7–20)
CALCIUM SERPL-MCNC: 8.9 MG/DL (ref 8.3–10.6)
CHLORIDE SERPL-SCNC: 99 MMOL/L (ref 99–110)
CLARITY UR: CLEAR
CO2 BLDV-SCNC: 23 MMOL/L
CO2 SERPL-SCNC: 21 MMOL/L (ref 21–32)
COHGB MFR BLDV: 1.7 % (ref 0–1.5)
COLOR UR: YELLOW
CREAT SERPL-MCNC: 2 MG/DL (ref 0.6–1.2)
DEPRECATED RDW RBC AUTO: 14.6 % (ref 12.4–15.4)
EKG DIAGNOSIS: NORMAL
EKG Q-T INTERVAL: 336 MS
EKG QRS DURATION: 76 MS
EKG QTC CALCULATION (BAZETT): 530 MS
EKG R AXIS: 62 DEGREES
EKG T AXIS: -63 DEGREES
EKG VENTRICULAR RATE: 150 BPM
EOSINOPHIL # BLD: 0.2 K/UL (ref 0–0.6)
EOSINOPHIL NFR BLD: 1.7 %
EPI CELLS #/AREA URNS HPF: ABNORMAL /HPF (ref 0–5)
GFR SERPLBLD CREATININE-BSD FMLA CKD-EPI: 24 ML/MIN/{1.73_M2}
GLUCOSE BLD-MCNC: 154 MG/DL (ref 70–99)
GLUCOSE SERPL-MCNC: 199 MG/DL (ref 70–99)
GLUCOSE UR STRIP.AUTO-MCNC: NEGATIVE MG/DL
HCO3 BLDV-SCNC: 21.5 MMOL/L (ref 23–29)
HCT VFR BLD AUTO: 31 % (ref 36–48)
HEMOCCULT SP2 STL QL: NORMAL
HEMOCCULT STL QL: NORMAL
HGB BLD-MCNC: 10 G/DL (ref 12–16)
HGB UR QL STRIP.AUTO: ABNORMAL
INR PPP: 0.95 (ref 0.85–1.15)
KETONES UR STRIP.AUTO-MCNC: NEGATIVE MG/DL
LACTATE BLDV-SCNC: 1.7 MMOL/L (ref 0.4–1.9)
LEUKOCYTE ESTERASE UR QL STRIP.AUTO: ABNORMAL
LYMPHOCYTES # BLD: 1.7 K/UL (ref 1–5.1)
LYMPHOCYTES NFR BLD: 19.2 %
MCH RBC QN AUTO: 26.7 PG (ref 26–34)
MCHC RBC AUTO-ENTMCNC: 32.2 G/DL (ref 31–36)
MCV RBC AUTO: 82.9 FL (ref 80–100)
METHGB MFR BLDV: 0.2 %
MONOCYTES # BLD: 1 K/UL (ref 0–1.3)
MONOCYTES NFR BLD: 11.5 %
MUCOUS THREADS #/AREA URNS LPF: ABNORMAL /LPF
NEUTROPHILS # BLD: 6 K/UL (ref 1.7–7.7)
NEUTROPHILS NFR BLD: 66.4 %
NITRITE UR QL STRIP.AUTO: NEGATIVE
NT-PROBNP SERPL-MCNC: 3495 PG/ML (ref 0–449)
O2 THERAPY: ABNORMAL
PCO2 BLDV: 38 MMHG (ref 40–50)
PERFORMED ON: ABNORMAL
PH BLDV: 7.37 [PH] (ref 7.35–7.45)
PH UR STRIP.AUTO: 5.5 [PH] (ref 5–8)
PLATELET # BLD AUTO: 404 K/UL (ref 135–450)
PMV BLD AUTO: 7.3 FL (ref 5–10.5)
PO2 BLDV: 38.6 MMHG (ref 25–40)
POTASSIUM SERPL-SCNC: 4 MMOL/L (ref 3.5–5.1)
PROT SERPL-MCNC: 7.9 G/DL (ref 6.4–8.2)
PROT UR STRIP.AUTO-MCNC: ABNORMAL MG/DL
PROTHROMBIN TIME: 12.9 SEC (ref 11.9–14.9)
RBC # BLD AUTO: 3.73 M/UL (ref 4–5.2)
RBC #/AREA URNS HPF: ABNORMAL /HPF (ref 0–4)
SAO2 % BLDV: 72 %
SODIUM SERPL-SCNC: 138 MMOL/L (ref 136–145)
SP GR UR STRIP.AUTO: 1.02 (ref 1–1.03)
TROPONIN, HIGH SENSITIVITY: 441 NG/L (ref 0–14)
TROPONIN, HIGH SENSITIVITY: 513 NG/L (ref 0–14)
TROPONIN, HIGH SENSITIVITY: 571 NG/L (ref 0–14)
TROPONIN, HIGH SENSITIVITY: 652 NG/L (ref 0–14)
UA COMPLETE W REFLEX CULTURE PNL UR: ABNORMAL
UA DIPSTICK W REFLEX MICRO PNL UR: YES
URN SPEC COLLECT METH UR: ABNORMAL
UROBILINOGEN UR STRIP-ACNC: 0.2 E.U./DL
WBC # BLD AUTO: 9 K/UL (ref 4–11)
WBC #/AREA URNS HPF: ABNORMAL /HPF (ref 0–5)
YEAST URNS QL MICRO: PRESENT /HPF

## 2024-07-16 PROCEDURE — 96365 THER/PROPH/DIAG IV INF INIT: CPT

## 2024-07-16 PROCEDURE — 2580000003 HC RX 258: Performed by: INTERNAL MEDICINE

## 2024-07-16 PROCEDURE — 85610 PROTHROMBIN TIME: CPT

## 2024-07-16 PROCEDURE — 85025 COMPLETE CBC W/AUTO DIFF WBC: CPT

## 2024-07-16 PROCEDURE — 84484 ASSAY OF TROPONIN QUANT: CPT

## 2024-07-16 PROCEDURE — 93005 ELECTROCARDIOGRAM TRACING: CPT | Performed by: EMERGENCY MEDICINE

## 2024-07-16 PROCEDURE — 6370000000 HC RX 637 (ALT 250 FOR IP): Performed by: EMERGENCY MEDICINE

## 2024-07-16 PROCEDURE — 82270 OCCULT BLOOD FECES: CPT

## 2024-07-16 PROCEDURE — 2060000000 HC ICU INTERMEDIATE R&B

## 2024-07-16 PROCEDURE — 86900 BLOOD TYPING SEROLOGIC ABO: CPT

## 2024-07-16 PROCEDURE — 6360000002 HC RX W HCPCS: Performed by: INTERNAL MEDICINE

## 2024-07-16 PROCEDURE — 86901 BLOOD TYPING SEROLOGIC RH(D): CPT

## 2024-07-16 PROCEDURE — 96376 TX/PRO/DX INJ SAME DRUG ADON: CPT

## 2024-07-16 PROCEDURE — 83605 ASSAY OF LACTIC ACID: CPT

## 2024-07-16 PROCEDURE — 83880 ASSAY OF NATRIURETIC PEPTIDE: CPT

## 2024-07-16 PROCEDURE — 82803 BLOOD GASES ANY COMBINATION: CPT

## 2024-07-16 PROCEDURE — 99291 CRITICAL CARE FIRST HOUR: CPT

## 2024-07-16 PROCEDURE — 86850 RBC ANTIBODY SCREEN: CPT

## 2024-07-16 PROCEDURE — 2580000003 HC RX 258: Performed by: EMERGENCY MEDICINE

## 2024-07-16 PROCEDURE — 96366 THER/PROPH/DIAG IV INF ADDON: CPT

## 2024-07-16 PROCEDURE — 96375 TX/PRO/DX INJ NEW DRUG ADDON: CPT

## 2024-07-16 PROCEDURE — 6370000000 HC RX 637 (ALT 250 FOR IP): Performed by: INTERNAL MEDICINE

## 2024-07-16 PROCEDURE — 81001 URINALYSIS AUTO W/SCOPE: CPT

## 2024-07-16 PROCEDURE — 80053 COMPREHEN METABOLIC PANEL: CPT

## 2024-07-16 PROCEDURE — 99285 EMERGENCY DEPT VISIT HI MDM: CPT

## 2024-07-16 PROCEDURE — 6360000002 HC RX W HCPCS: Performed by: EMERGENCY MEDICINE

## 2024-07-16 PROCEDURE — 2500000003 HC RX 250 WO HCPCS: Performed by: EMERGENCY MEDICINE

## 2024-07-16 PROCEDURE — 71046 X-RAY EXAM CHEST 2 VIEWS: CPT

## 2024-07-16 PROCEDURE — 36415 COLL VENOUS BLD VENIPUNCTURE: CPT

## 2024-07-16 PROCEDURE — 93010 ELECTROCARDIOGRAM REPORT: CPT | Performed by: INTERNAL MEDICINE

## 2024-07-16 RX ORDER — METOPROLOL TARTRATE 1 MG/ML
5 INJECTION, SOLUTION INTRAVENOUS ONCE
Status: COMPLETED | OUTPATIENT
Start: 2024-07-16 | End: 2024-07-16

## 2024-07-16 RX ORDER — AMLODIPINE BESYLATE 5 MG/1
5 TABLET ORAL DAILY
Status: DISCONTINUED | OUTPATIENT
Start: 2024-07-16 | End: 2024-07-19 | Stop reason: HOSPADM

## 2024-07-16 RX ORDER — SODIUM CHLORIDE 9 MG/ML
INJECTION, SOLUTION INTRAVENOUS PRN
Status: DISCONTINUED | OUTPATIENT
Start: 2024-07-16 | End: 2024-07-19 | Stop reason: HOSPADM

## 2024-07-16 RX ORDER — PANTOPRAZOLE SODIUM 40 MG/10ML
40 INJECTION, POWDER, LYOPHILIZED, FOR SOLUTION INTRAVENOUS ONCE
Status: COMPLETED | OUTPATIENT
Start: 2024-07-16 | End: 2024-07-16

## 2024-07-16 RX ORDER — ONDANSETRON 2 MG/ML
4 INJECTION INTRAMUSCULAR; INTRAVENOUS EVERY 6 HOURS PRN
Status: DISCONTINUED | OUTPATIENT
Start: 2024-07-16 | End: 2024-07-16

## 2024-07-16 RX ORDER — DEXTROSE MONOHYDRATE 100 MG/ML
INJECTION, SOLUTION INTRAVENOUS CONTINUOUS PRN
Status: DISCONTINUED | OUTPATIENT
Start: 2024-07-16 | End: 2024-07-19 | Stop reason: HOSPADM

## 2024-07-16 RX ORDER — PROCHLORPERAZINE EDISYLATE 5 MG/ML
10 INJECTION INTRAMUSCULAR; INTRAVENOUS EVERY 6 HOURS PRN
Status: DISCONTINUED | OUTPATIENT
Start: 2024-07-16 | End: 2024-07-19 | Stop reason: HOSPADM

## 2024-07-16 RX ORDER — INSULIN LISPRO 100 [IU]/ML
0-4 INJECTION, SOLUTION INTRAVENOUS; SUBCUTANEOUS EVERY 4 HOURS
Status: DISCONTINUED | OUTPATIENT
Start: 2024-07-16 | End: 2024-07-19 | Stop reason: HOSPADM

## 2024-07-16 RX ORDER — ACETAMINOPHEN 650 MG/1
650 SUPPOSITORY RECTAL EVERY 6 HOURS PRN
Status: DISCONTINUED | OUTPATIENT
Start: 2024-07-16 | End: 2024-07-19 | Stop reason: HOSPADM

## 2024-07-16 RX ORDER — SODIUM CHLORIDE 0.9 % (FLUSH) 0.9 %
5-40 SYRINGE (ML) INJECTION PRN
Status: DISCONTINUED | OUTPATIENT
Start: 2024-07-16 | End: 2024-07-19 | Stop reason: HOSPADM

## 2024-07-16 RX ORDER — ONDANSETRON 4 MG/1
4 TABLET, ORALLY DISINTEGRATING ORAL EVERY 8 HOURS PRN
Status: DISCONTINUED | OUTPATIENT
Start: 2024-07-16 | End: 2024-07-16

## 2024-07-16 RX ORDER — MECOBALAMIN 5000 MCG
10 TABLET,DISINTEGRATING ORAL NIGHTLY
Status: DISCONTINUED | OUTPATIENT
Start: 2024-07-16 | End: 2024-07-19 | Stop reason: HOSPADM

## 2024-07-16 RX ORDER — SODIUM CHLORIDE 0.9 % (FLUSH) 0.9 %
5-40 SYRINGE (ML) INJECTION EVERY 12 HOURS SCHEDULED
Status: DISCONTINUED | OUTPATIENT
Start: 2024-07-16 | End: 2024-07-19 | Stop reason: HOSPADM

## 2024-07-16 RX ORDER — ACETAMINOPHEN 325 MG/1
650 TABLET ORAL EVERY 6 HOURS PRN
Status: DISCONTINUED | OUTPATIENT
Start: 2024-07-16 | End: 2024-07-19 | Stop reason: HOSPADM

## 2024-07-16 RX ORDER — HYDROCHLOROTHIAZIDE 25 MG/1
25 TABLET ORAL DAILY
Status: DISCONTINUED | OUTPATIENT
Start: 2024-07-16 | End: 2024-07-19 | Stop reason: HOSPADM

## 2024-07-16 RX ORDER — METOCLOPRAMIDE HYDROCHLORIDE 5 MG/ML
10 INJECTION INTRAMUSCULAR; INTRAVENOUS ONCE
Status: COMPLETED | OUTPATIENT
Start: 2024-07-16 | End: 2024-07-16

## 2024-07-16 RX ORDER — ASPIRIN 81 MG/1
324 TABLET, CHEWABLE ORAL ONCE
Status: COMPLETED | OUTPATIENT
Start: 2024-07-16 | End: 2024-07-16

## 2024-07-16 RX ORDER — LOSARTAN POTASSIUM 100 MG/1
100 TABLET ORAL DAILY
Status: DISCONTINUED | OUTPATIENT
Start: 2024-07-16 | End: 2024-07-19 | Stop reason: HOSPADM

## 2024-07-16 RX ORDER — GLUCAGON 1 MG/ML
1 KIT INJECTION PRN
Status: DISCONTINUED | OUTPATIENT
Start: 2024-07-16 | End: 2024-07-19 | Stop reason: HOSPADM

## 2024-07-16 RX ORDER — 0.9 % SODIUM CHLORIDE 0.9 %
1000 INTRAVENOUS SOLUTION INTRAVENOUS ONCE
Status: COMPLETED | OUTPATIENT
Start: 2024-07-16 | End: 2024-07-16

## 2024-07-16 RX ADMIN — PANTOPRAZOLE SODIUM 8 MG/HR: 40 INJECTION, POWDER, FOR SOLUTION INTRAVENOUS at 09:42

## 2024-07-16 RX ADMIN — PANTOPRAZOLE SODIUM 40 MG: 40 INJECTION, POWDER, FOR SOLUTION INTRAVENOUS at 07:26

## 2024-07-16 RX ADMIN — METOPROLOL TARTRATE 5 MG: 5 INJECTION INTRAVENOUS at 07:47

## 2024-07-16 RX ADMIN — AMLODIPINE BESYLATE 5 MG: 5 TABLET ORAL at 20:54

## 2024-07-16 RX ADMIN — PANTOPRAZOLE SODIUM 40 MG: 40 INJECTION, POWDER, FOR SOLUTION INTRAVENOUS at 09:34

## 2024-07-16 RX ADMIN — HYDROCHLOROTHIAZIDE 25 MG: 25 TABLET ORAL at 20:54

## 2024-07-16 RX ADMIN — METOCLOPRAMIDE 10 MG: 5 INJECTION, SOLUTION INTRAMUSCULAR; INTRAVENOUS at 07:26

## 2024-07-16 RX ADMIN — ASPIRIN 324 MG: 81 TABLET, CHEWABLE ORAL at 08:10

## 2024-07-16 RX ADMIN — PANTOPRAZOLE SODIUM 8 MG/HR: 40 INJECTION, POWDER, FOR SOLUTION INTRAVENOUS at 19:55

## 2024-07-16 RX ADMIN — SODIUM CHLORIDE 1000 ML: 9 INJECTION, SOLUTION INTRAVENOUS at 07:21

## 2024-07-16 RX ADMIN — METOPROLOL TARTRATE 5 MG: 5 INJECTION INTRAVENOUS at 09:35

## 2024-07-16 RX ADMIN — METOPROLOL TARTRATE 5 MG: 5 INJECTION INTRAVENOUS at 15:38

## 2024-07-16 ASSESSMENT — ENCOUNTER SYMPTOMS
BACK PAIN: 0
NAUSEA: 1
RHINORRHEA: 0
VOMITING: 0
BLOOD IN STOOL: 1
COUGH: 0
DIARRHEA: 0
CHEST TIGHTNESS: 0
SHORTNESS OF BREATH: 0
ABDOMINAL PAIN: 0

## 2024-07-16 ASSESSMENT — PAIN SCALES - GENERAL
PAINLEVEL_OUTOF10: 0
PAINLEVEL_OUTOF10: 0

## 2024-07-16 ASSESSMENT — PAIN - FUNCTIONAL ASSESSMENT: PAIN_FUNCTIONAL_ASSESSMENT: 0-10

## 2024-07-16 NOTE — ED NOTES
1610  [facility] called with room and report numbers:    [facility] room #444  Report # [phone]    Info placed into [facility] for transport.  [facility] at 1715.

## 2024-07-16 NOTE — PROGRESS NOTES
4 Eyes Skin Assessment     NAME:  Della Minor  YOB: 1940  MEDICAL RECORD NUMBER:  [id number]    The patient is being assessed for  Admission    I agree that at least one RN has performed a thorough Head to Toe Skin Assessment on the patient. ALL assessment sites listed below have been assessed.      Areas assessed by both nurses:    Head, Face, Ears, Shoulders, Back, Chest, Arms, Elbows, Hands, Sacrum. Buttock, Coccyx, Ischium, and Legs. Feet and Heels        Does the Patient have a Wound? No noted wound(s)       Alfred Prevention initiated by RN: No  Wound Care Orders initiated by RN: No    Pressure Injury (Stage 3,4, Unstageable, DTI, NWPT, and Complex wounds) if present, place Wound referral order by RN under : No    New Ostomies, if present place, Ostomy referral order under : No     Nurse 1 eSignature: Electronically signed by Castro Cash RN on 7/16/24 at 6:50 PM EDT    **SHARE this note so that the co-signing nurse can place an eSignature**    Nurse 2 eSignature: Electronically signed by Serina Kelly RN on 7/17/24 at 12:01 AM EDT

## 2024-07-16 NOTE — ED PROVIDER NOTES
Spoke with transfer center, is discharge dependent.  They will continue wait for bed at [facility].    3:53 PM EDT  Discussed got patient something to eat and drink.  She still has no chest pain.  Got another dose of Lopressor.  Tolerating well.  Tolerating Protonix well.  No bowel movement here.  Fourth troponin pending, still on the downtrend.     4:47 PM EDT  Remained stable.  Tolerating p.o.  Excepted at [facility].  Has been in the emergency department for 9 hours and 51 minutes.  Is about to leave via ALS transport on Protonix drip.  Family very thankful.  Patient transferred in stable condition        CONSULTS:   Cardiology regarding new onset A-fib  Hospitalist for admission and transfer      Is this patient to be included in the SEP-1 Core Measure due to severe sepsis or septic shock?   No   Exclusion criteria - the patient is NOT to be included for SEP-1 Core Measure due to:  Infection is not suspected      CLINICAL IMPRESSION:     ICD-10-CM    1. Gastrointestinal hemorrhage, unspecified gastrointestinal hemorrhage type  K92.2       2. Black stool  K92.1       3. Tachycardia  R00.0       4. New onset atrial fibrillation ([facility])  I48.91       5. NSTEMI (non-ST elevated myocardial infarction) ([facility])  I21.4       6. KELLIE (acute kidney injury) ([facility])  N17.9       7. Anemia, unspecified type  D64.9       8. Hemorrhoids, unspecified hemorrhoid type  K64.9             DISPOSITION:  I discussed the results, including any incidental findings, with patient and family member patient and daughter and through shared decision making;   Disposition today from the ED will be: Transfer to [facility] to PCU in fair condition.   Questions answered. They are agreeable to plan and express understanding of plan.     Social Determinants : None      CRITICAL CARE:   Total critical care time is 95 minutes, which excludes separately billable procedures and updating family. Time spent is specifically for management of the presenting

## 2024-07-17 ENCOUNTER — ANESTHESIA (OUTPATIENT)
Dept: ENDOSCOPY | Age: 84
End: 2024-07-17
Payer: MEDICARE

## 2024-07-17 ENCOUNTER — ANESTHESIA EVENT (OUTPATIENT)
Dept: ENDOSCOPY | Age: 84
End: 2024-07-17
Payer: MEDICARE

## 2024-07-17 LAB
ANION GAP SERPL CALCULATED.3IONS-SCNC: 10 MMOL/L (ref 3–16)
APTT BLD: 25.1 SEC (ref 22.1–36.4)
BUN SERPL-MCNC: 19 MG/DL (ref 7–20)
CALCIUM SERPL-MCNC: 8.4 MG/DL (ref 8.3–10.6)
CHLORIDE SERPL-SCNC: 103 MMOL/L (ref 99–110)
CO2 SERPL-SCNC: 24 MMOL/L (ref 21–32)
CREAT SERPL-MCNC: 1.1 MG/DL (ref 0.6–1.2)
EKG ATRIAL RATE: 96 BPM
EKG DIAGNOSIS: NORMAL
EKG P AXIS: 54 DEGREES
EKG P-R INTERVAL: 124 MS
EKG Q-T INTERVAL: 352 MS
EKG QRS DURATION: 88 MS
EKG QTC CALCULATION (BAZETT): 444 MS
EKG R AXIS: 48 DEGREES
EKG T AXIS: -1 DEGREES
EKG VENTRICULAR RATE: 96 BPM
EST. AVERAGE GLUCOSE BLD GHB EST-MCNC: 168.6 MG/DL
GFR SERPLBLD CREATININE-BSD FMLA CKD-EPI: 49 ML/MIN/{1.73_M2}
GLUCOSE BLD-MCNC: 122 MG/DL (ref 70–99)
GLUCOSE BLD-MCNC: 122 MG/DL (ref 70–99)
GLUCOSE BLD-MCNC: 124 MG/DL (ref 70–99)
GLUCOSE BLD-MCNC: 132 MG/DL (ref 70–99)
GLUCOSE BLD-MCNC: 147 MG/DL (ref 70–99)
GLUCOSE BLD-MCNC: 165 MG/DL (ref 70–99)
GLUCOSE BLD-MCNC: 170 MG/DL (ref 70–99)
GLUCOSE SERPL-MCNC: 154 MG/DL (ref 70–99)
HBA1C MFR BLD: 7.5 %
HCT VFR BLD AUTO: 28.4 % (ref 36–48)
HCT VFR BLD AUTO: 28.9 % (ref 36–48)
HCT VFR BLD AUTO: 29.1 % (ref 36–48)
HCT VFR BLD AUTO: 30.4 % (ref 36–48)
HGB BLD-MCNC: 9.1 G/DL (ref 12–16)
HGB BLD-MCNC: 9.4 G/DL (ref 12–16)
HGB BLD-MCNC: 9.5 G/DL (ref 12–16)
HGB BLD-MCNC: 9.8 G/DL (ref 12–16)
INR PPP: 1.07 (ref 0.85–1.15)
IRON SATN MFR SERPL: 7 % (ref 15–50)
IRON SERPL-MCNC: 21 UG/DL (ref 37–145)
PERFORMED ON: ABNORMAL
POTASSIUM SERPL-SCNC: 4.1 MMOL/L (ref 3.5–5.1)
PROTHROMBIN TIME: 14.1 SEC (ref 11.9–14.9)
SODIUM SERPL-SCNC: 137 MMOL/L (ref 136–145)
TIBC SERPL-MCNC: 316 UG/DL (ref 260–445)
TROPONIN, HIGH SENSITIVITY: 260 NG/L (ref 0–14)

## 2024-07-17 PROCEDURE — 80048 BASIC METABOLIC PNL TOTAL CA: CPT

## 2024-07-17 PROCEDURE — 3609017100 HC EGD: Performed by: INTERNAL MEDICINE

## 2024-07-17 PROCEDURE — 2500000003 HC RX 250 WO HCPCS

## 2024-07-17 PROCEDURE — 85730 THROMBOPLASTIN TIME PARTIAL: CPT

## 2024-07-17 PROCEDURE — 84484 ASSAY OF TROPONIN QUANT: CPT

## 2024-07-17 PROCEDURE — 7100000011 HC PHASE II RECOVERY - ADDTL 15 MIN: Performed by: INTERNAL MEDICINE

## 2024-07-17 PROCEDURE — 6360000002 HC RX W HCPCS: Performed by: INTERNAL MEDICINE

## 2024-07-17 PROCEDURE — 83550 IRON BINDING TEST: CPT

## 2024-07-17 PROCEDURE — 85610 PROTHROMBIN TIME: CPT

## 2024-07-17 PROCEDURE — 6370000000 HC RX 637 (ALT 250 FOR IP): Performed by: NURSE PRACTITIONER

## 2024-07-17 PROCEDURE — 2580000003 HC RX 258

## 2024-07-17 PROCEDURE — 83036 HEMOGLOBIN GLYCOSYLATED A1C: CPT

## 2024-07-17 PROCEDURE — 2060000000 HC ICU INTERMEDIATE R&B

## 2024-07-17 PROCEDURE — 36415 COLL VENOUS BLD VENIPUNCTURE: CPT

## 2024-07-17 PROCEDURE — 2580000003 HC RX 258: Performed by: INTERNAL MEDICINE

## 2024-07-17 PROCEDURE — 6370000000 HC RX 637 (ALT 250 FOR IP): Performed by: INTERNAL MEDICINE

## 2024-07-17 PROCEDURE — 3700000000 HC ANESTHESIA ATTENDED CARE: Performed by: INTERNAL MEDICINE

## 2024-07-17 PROCEDURE — 3700000001 HC ADD 15 MINUTES (ANESTHESIA): Performed by: INTERNAL MEDICINE

## 2024-07-17 PROCEDURE — 85018 HEMOGLOBIN: CPT

## 2024-07-17 PROCEDURE — 99222 1ST HOSP IP/OBS MODERATE 55: CPT | Performed by: INTERNAL MEDICINE

## 2024-07-17 PROCEDURE — 7100000010 HC PHASE II RECOVERY - FIRST 15 MIN: Performed by: INTERNAL MEDICINE

## 2024-07-17 PROCEDURE — 83540 ASSAY OF IRON: CPT

## 2024-07-17 PROCEDURE — 0DJ08ZZ INSPECTION OF UPPER INTESTINAL TRACT, VIA NATURAL OR ARTIFICIAL OPENING ENDOSCOPIC: ICD-10-PCS | Performed by: INTERNAL MEDICINE

## 2024-07-17 PROCEDURE — 85014 HEMATOCRIT: CPT

## 2024-07-17 PROCEDURE — 93005 ELECTROCARDIOGRAM TRACING: CPT | Performed by: INTERNAL MEDICINE

## 2024-07-17 PROCEDURE — 2709999900 HC NON-CHARGEABLE SUPPLY: Performed by: INTERNAL MEDICINE

## 2024-07-17 PROCEDURE — 93010 ELECTROCARDIOGRAM REPORT: CPT | Performed by: INTERNAL MEDICINE

## 2024-07-17 PROCEDURE — 6360000002 HC RX W HCPCS

## 2024-07-17 RX ORDER — METOCLOPRAMIDE HYDROCHLORIDE 5 MG/ML
10 INJECTION INTRAMUSCULAR; INTRAVENOUS EVERY 6 HOURS
Status: COMPLETED | OUTPATIENT
Start: 2024-07-17 | End: 2024-07-17

## 2024-07-17 RX ORDER — NALOXONE HYDROCHLORIDE 0.4 MG/ML
INJECTION, SOLUTION INTRAMUSCULAR; INTRAVENOUS; SUBCUTANEOUS PRN
Status: DISCONTINUED | OUTPATIENT
Start: 2024-07-17 | End: 2024-07-17 | Stop reason: HOSPADM

## 2024-07-17 RX ORDER — SODIUM CHLORIDE 9 MG/ML
INJECTION, SOLUTION INTRAVENOUS PRN
Status: DISCONTINUED | OUTPATIENT
Start: 2024-07-17 | End: 2024-07-17 | Stop reason: HOSPADM

## 2024-07-17 RX ORDER — SODIUM CHLORIDE, SODIUM LACTATE, POTASSIUM CHLORIDE, CALCIUM CHLORIDE 600; 310; 30; 20 MG/100ML; MG/100ML; MG/100ML; MG/100ML
INJECTION, SOLUTION INTRAVENOUS CONTINUOUS PRN
Status: DISCONTINUED | OUTPATIENT
Start: 2024-07-17 | End: 2024-07-17 | Stop reason: SDUPTHER

## 2024-07-17 RX ORDER — SODIUM CHLORIDE 0.9 % (FLUSH) 0.9 %
5-40 SYRINGE (ML) INJECTION PRN
Status: DISCONTINUED | OUTPATIENT
Start: 2024-07-17 | End: 2024-07-17 | Stop reason: HOSPADM

## 2024-07-17 RX ORDER — ONDANSETRON 2 MG/ML
4 INJECTION INTRAMUSCULAR; INTRAVENOUS
Status: DISCONTINUED | OUTPATIENT
Start: 2024-07-17 | End: 2024-07-17 | Stop reason: HOSPADM

## 2024-07-17 RX ORDER — PROPOFOL 10 MG/ML
INJECTION, EMULSION INTRAVENOUS PRN
Status: DISCONTINUED | OUTPATIENT
Start: 2024-07-17 | End: 2024-07-17 | Stop reason: SDUPTHER

## 2024-07-17 RX ORDER — SODIUM CHLORIDE 0.9 % (FLUSH) 0.9 %
5-40 SYRINGE (ML) INJECTION EVERY 12 HOURS SCHEDULED
Status: DISCONTINUED | OUTPATIENT
Start: 2024-07-17 | End: 2024-07-17 | Stop reason: HOSPADM

## 2024-07-17 RX ORDER — BISACODYL 5 MG/1
20 TABLET, DELAYED RELEASE ORAL ONCE
Status: COMPLETED | OUTPATIENT
Start: 2024-07-17 | End: 2024-07-17

## 2024-07-17 RX ORDER — IPRATROPIUM BROMIDE AND ALBUTEROL SULFATE 2.5; .5 MG/3ML; MG/3ML
1 SOLUTION RESPIRATORY (INHALATION)
Status: DISCONTINUED | OUTPATIENT
Start: 2024-07-17 | End: 2024-07-17 | Stop reason: HOSPADM

## 2024-07-17 RX ORDER — LIDOCAINE HYDROCHLORIDE 20 MG/ML
INJECTION, SOLUTION INFILTRATION; PERINEURAL PRN
Status: DISCONTINUED | OUTPATIENT
Start: 2024-07-17 | End: 2024-07-17 | Stop reason: SDUPTHER

## 2024-07-17 RX ADMIN — METOPROLOL TARTRATE 75 MG: 50 TABLET, FILM COATED ORAL at 21:03

## 2024-07-17 RX ADMIN — METOPROLOL TARTRATE 75 MG: 50 TABLET, FILM COATED ORAL at 11:00

## 2024-07-17 RX ADMIN — LOSARTAN POTASSIUM 100 MG: 100 TABLET, FILM COATED ORAL at 11:00

## 2024-07-17 RX ADMIN — METOCLOPRAMIDE HYDROCHLORIDE 10 MG: 5 INJECTION INTRAMUSCULAR; INTRAVENOUS at 23:44

## 2024-07-17 RX ADMIN — SODIUM CHLORIDE, PRESERVATIVE FREE 10 ML: 5 INJECTION INTRAVENOUS at 21:03

## 2024-07-17 RX ADMIN — METOCLOPRAMIDE HYDROCHLORIDE 10 MG: 5 INJECTION INTRAMUSCULAR; INTRAVENOUS at 18:12

## 2024-07-17 RX ADMIN — PANTOPRAZOLE SODIUM 8 MG/HR: 40 INJECTION, POWDER, FOR SOLUTION INTRAVENOUS at 06:14

## 2024-07-17 RX ADMIN — PROPOFOL 25 MG: 10 INJECTION, EMULSION INTRAVENOUS at 15:15

## 2024-07-17 RX ADMIN — SODIUM CHLORIDE, SODIUM LACTATE, POTASSIUM CHLORIDE, AND CALCIUM CHLORIDE: .6; .31; .03; .02 INJECTION, SOLUTION INTRAVENOUS at 15:07

## 2024-07-17 RX ADMIN — SODIUM CHLORIDE, PRESERVATIVE FREE 10 ML: 5 INJECTION INTRAVENOUS at 23:44

## 2024-07-17 RX ADMIN — BISACODYL 20 MG: 5 TABLET, COATED ORAL at 18:10

## 2024-07-17 RX ADMIN — LIDOCAINE HYDROCHLORIDE 60 MG: 20 INJECTION, SOLUTION INFILTRATION; PERINEURAL at 15:13

## 2024-07-17 RX ADMIN — AMLODIPINE BESYLATE 5 MG: 5 TABLET ORAL at 11:00

## 2024-07-17 RX ADMIN — HYDROCHLOROTHIAZIDE 25 MG: 25 TABLET ORAL at 11:00

## 2024-07-17 RX ADMIN — PROPOFOL 50 MG: 10 INJECTION, EMULSION INTRAVENOUS at 15:13

## 2024-07-17 RX ADMIN — POLYETHYLENE GLYCOL-3350 AND ELECTROLYTES 4000 ML: 236; 6.74; 5.86; 2.97; 22.74 POWDER, FOR SOLUTION ORAL at 18:14

## 2024-07-17 RX ADMIN — ACETAMINOPHEN 650 MG: 325 TABLET ORAL at 11:37

## 2024-07-17 ASSESSMENT — PAIN - FUNCTIONAL ASSESSMENT
PAIN_FUNCTIONAL_ASSESSMENT: 0-10
PAIN_FUNCTIONAL_ASSESSMENT: ACTIVITIES ARE NOT PREVENTED

## 2024-07-17 ASSESSMENT — PAIN SCALES - GENERAL
PAINLEVEL_OUTOF10: 0
PAINLEVEL_OUTOF10: 4
PAINLEVEL_OUTOF10: 5
PAINLEVEL_OUTOF10: 0
PAINLEVEL_OUTOF10: 0

## 2024-07-17 ASSESSMENT — PAIN DESCRIPTION - ORIENTATION: ORIENTATION: POSTERIOR

## 2024-07-17 ASSESSMENT — PAIN DESCRIPTION - LOCATION: LOCATION: NECK

## 2024-07-17 ASSESSMENT — PAIN DESCRIPTION - DESCRIPTORS: DESCRIPTORS: ACHING

## 2024-07-17 NOTE — H&P
Hospital Medicine History & Physical      Date of Admission: 7/16/2024    Date of Service:  Pt seen/examined on 07/16/24     [x]Admitted to Inpatient with expected LOS greater than two midnights due to medical therapy.  []Placed in Observation status.    Chief Admission Complaint:  melena    Presenting Admission History:      84 y.o. female who presented to [facility] with melena.  PMHx significant for DMII, HTN. Patient started having tarry stools today. She has  a prior history of GI bleed from an ulcer about five years ago. She had a similar presentation at that time. She was having normal stools prior to today. She denies abdominal pain. She has a normal appetite. Her BG levels are normally controlled despite being markedly elevated on arrival to the ED. She was also noted to have an up trending troponin. She denies any chest pain or SOB.    Assessment/Plan:      Current Principal Problem:  GI bleed    Acute blood loss anemia 2/2 GI bleed  - GI consulted  - NPO  - continue PPI gtt  - will likely need EGD in AM  - monitor H/H closely    Elevated troponin  - cardiology consulted  - no active chest pain  - possibly 2/2 above  - avoid heparin gtt due to active bleeding  - continue to trend trop  - defer further cardiac testing to cardiology    DMII  - poorly controlled  - holding home oral meds  - continue SSI    HTN  - poorly controlled  - continue norvasc, HCTZ, metoprolol    CXR: I have reviewed the CXR with the following interpretation: no acute acute disease  EKG:  I have reviewed the EKG with the following interpretation: SVT    Physical Exam Performed:      BP (!) 145/73   Pulse 83   Temp 98.3 °F (36.8 °C) (Oral)   Resp 18   Ht 1.549 m (5' 1\")   Wt 89.4 kg (197 lb)   LMP  (LMP Unknown)   SpO2 95%   BMI 37.22 kg/m²     General appearance:  No apparent distress, appears stated age and cooperative.  HEENT:  Pupils equal, round, and reactive to light. Conjunctivae/corneas clear.  Respiratory:     metFORMIN (GLUCOPHAGE) 500 MG tablet TAKE 1 TABLET BY MOUTH TWICE  DAILY 5/9/24   Eladio Osei MD   metoprolol tartrate (LOPRESSOR) 50 MG tablet TAKE 1 AND 1/2 TABLETS BY  MOUTH TWICE DAILY 3/4/24   Eladio Osei MD   glipiZIDE (GLUCOTROL) 10 MG tablet TAKE 1 TABLET BY MOUTH  TWICE DAILY BEFORE A MEAL 1/18/24   Eladio Osei MD   hydroCHLOROthiazide (HYDRODIURIL) 25 MG tablet TAKE 1 TABLET BY MOUTH DAILY 11/20/23   Eladio Osei MD   omeprazole (PRILOSEC) 40 MG delayed release capsule TAKE 1 CAPSULE BY MOUTH IN THE  MORNING BEFORE BREAKFAST 9/19/23   Eladio Osei MD   vitamin B-12 (CYANOCOBALAMIN) 500 MCG tablet Take 1 tablet by mouth daily    Provider, MD Marjorie       Labs: Personally reviewed and interpreted for clinical significance.   Recent Labs     07/16/24  0712   WBC 9.0   HGB 10.0*   HCT 31.0*        Recent Labs     07/16/24  0712      K 4.0   CL 99   CO2 21   BUN 32*   CREATININE 2.0*   CALCIUM 8.9     Recent Labs     07/16/24  0712 07/16/24  0942 07/16/24  1155 07/16/24  1541   PROBNP 3,495*  --   --   --    TROPHS 652* 571* 513* 441*     No results for input(s): \"LABA1C\" in the last 72 hours.  Recent Labs     07/16/24  0712   AST 13*   ALT 11   BILITOT 0.3   ALKPHOS 72     Recent Labs     07/16/24  0712   INR 0.95        Mike Marshall MD

## 2024-07-17 NOTE — BRIEF OP NOTE
Post-Sedation Assessment  Della Minor   7/17/2024  A pre-sedation re-evaluation was performed immediately prior to beginning the procedure.  Procedure: EGD  Preprocedure Dx: Melena  Postprocedure Dx: hiatal hernia, Schatzki's ring  Medications: Procedural sedation per anesthesia  Complications: None  Estimated Blood Loss: none  Specimens: were not obtained    A/P: Etiology of melena is not seen in this exam.  The possibility of a colonoscopy will be d/w the pt.    Kian Cannon MD

## 2024-07-17 NOTE — CONSULTS
[facility]   CONSULTATION  [phone]      Attending Physician: Mike Marshall MD  Reason for Consultation/Chief Complaint: Dark stools    Subjective   History of Present Illness:  Della Minor is a 84 y.o. patient who presented to the hospital with complaints of dark stools, patient was admitted to the hospital overnight, there were concerns about GI bleeding, hemoglobin was 10 g in the 9.5 g.  Patient says she has not noted more bleeding, she just feels very weak and tired today, she denies chest pain or shortness of breath.  In the course of workup, high-sensitivity troponin level was elevated at 513 and then 441 and then 260.  proBNP level was 3495.  Patient had acute kidney injury with BUN and creatinine of 32 and 2.0.  In the course evaluation and on presentation she was tachycardic, EKG showed SVT.  Patient's daughters are at the bedside and will provide/confirm history, patient and the daughters notes no prior cardiac history.    Chronically, she has had hypertension and diabetes    Past Medical History:   has a past medical history of Diabetes mellitus (HCC), Hyperlipidemia, Hypertension, Neuropathy, OA (osteoarthritis), Osteoarthritis, Type II or unspecified type diabetes mellitus without mention of complication, not stated as uncontrolled, and Venous insufficiency.    Surgical History:   has a past surgical history that includes Carpal tunnel release;  section; Total knee arthroplasty (11); knee surgery (Bilateral); and Upper gastrointestinal endoscopy (N/A, 2019).     Social History:   reports that she has never smoked. She has never used smokeless tobacco. She reports that she does not drink alcohol and does not use drugs.     Family History:  family history includes Heart Disease in her father and mother.      Home Medications:  Were reviewed and are listed in nursing record and/or below  Prior to Admission medications    Medication Sig Start Date End Date  Discussed with patient and family that stress testing/ischemia evaluation/cardiac catheterization would not be indicated at this time and risks are likely greater than benefits to pursue that at present time and would reconsider that in the outpatient setting.    Hypertension, continue home medications including beta-blocker    Preoperative cardiac risk assessment, patient can likely proceed with GI evaluation/scope/procedures if needed, would obtain echocardiogram and if LV function is normal, patient can proceed with those procedures at intermediate cardiac risk    No further inpatient cardiac workup or treatment planned, will sign off, please call with questions.       Thank you for allowing us to participate in the care of Della Minor. Please call me with any questions [phone].    Yung Cantrell MD, [facility]   Interventional Cardiologist  [facility]  [phone] [place] Office  [phone] [place] Office  7/17/2024 11:12 AM

## 2024-07-17 NOTE — PROGRESS NOTES
Paged Roxanna Vazquez NP    patient admitted for GIB. Patient requesting something to help her sleep. Thank you, Serina

## 2024-07-17 NOTE — CARE COORDINATION
Case Management Assessment  Initial Evaluation    Date/Time of Evaluation: 7/17/2024 3:28 PM  Assessment Completed by: Aye Llamas RN    If patient is discharged prior to next notation, then this note serves as note for discharge by case management.    Patient Name: Della Minor                   YOB: 1940  Diagnosis: GI bleed [K92.2]                   Date / Time: 7/16/2024  6:06 PM    Patient Admission Status: Inpatient   Readmission Risk (Low < 19, Mod (19-27), High > 27): Readmission Risk Score: 12.8    Current PCP: Eladio Osei MD  PCP verified by CM? (P) Yes    Chart Reviewed: Yes      History Provided by: (P) Child/Family  Patient Orientation: (P) Alert and Oriented    Patient Cognition: (P) Alert    Hospitalization in the last 30 days (Readmission):  No    If yes, Readmission Assessment in CM Navigator will be completed.    Advance Directives:      Code Status: Limited   Patient's Primary Decision Maker is: (P) Legal Next of Kin    Primary Decision Maker: Madelaine Lorenz - Child - [phone]    Secondary Decision Maker: Nena Mathews  Child - [phone]    Discharge Planning:    Patient lives with: (P) Alone Type of Home: (P) House  Primary Care Giver: (P) Self  Patient Support Systems include: (P) Children   Current Financial resources: (P) Medicare  Current community resources: (P) None  Current services prior to admission: (P) None            Current DME:              Type of Home Care services:  (P) None    ADLS  Prior functional level: (P) Independent in ADLs/IADLs  Current functional level: (P) Independent in ADLs/IADLs    PT AM-PAC:   /24  OT AM-PAC:   /24    Family can provide assistance at DC: (P) Yes  Would you like Case Management to discuss the discharge plan with any other family members/significant others, and if so, who? (P) Yes (adult children)  Plans to Return to Present Housing: (P) Yes  Other Identified Issues/Barriers to RETURNING to current housing:

## 2024-07-17 NOTE — PROGRESS NOTES
Hospital Medicine Progress Note      Date of Admission: 7/16/2024  Hospital Day: 2    Chief Admission Complaint:  melena     Subjective:  denies chest pain. Denies any bleeding since yesterday.    Presenting Admission History:       84 y.o. female who presented to [facility] with melena.  PMHx significant for DMII, HTN. Patient started having tarry stools today. She has  a prior history of GI bleed from an ulcer about five years ago. She had a similar presentation at that time. She was having normal stools prior to today. She denies abdominal pain. She has a normal appetite. Her BG levels are normally controlled despite being markedly elevated on arrival to the ED. She was also noted to have an up trending troponin. She denies any chest pain or SOB.     Assessment/Plan:      Current Principal Problem:  GI bleed    Acute blood loss anemia 2/2 GI bleed  - GI consulted  - NPO  - continue PPI gtt  - plan for EGD today  - monitor H/H closely    Elevated troponin  - cardiology consulted  - no active chest pain  - possibly 2/2 above  - avoid heparin gtt due to active bleeding  - trop currently down trending  - echo ordered  - low concern for ACS     DMII  - poorly controlled  - holding home oral meds  - continue SSI     HTN  - poorly controlled  - continue norvasc, HCTZ, metoprolol    Physical Exam Performed:      General appearance:  No apparent distress  Respiratory:  Normal respiratory effort.   Cardiovascular:  Regular rate and rhythm.  Abdomen:  Soft, non-tender, non-distended.  Musculoskeletal:  No edema  Neurologic:  Non-focal  Psychiatric:  Alert and oriented    BP (!) 159/74   Pulse 98   Temp 98.2 °F (36.8 °C) (Oral)   Resp 16   Ht 1.549 m (5' 1\")   Wt 87.3 kg (192 lb 8 oz)   LMP  (LMP Unknown)   SpO2 96%   BMI 36.37 kg/m²     Diet: Diet NPO Exceptions are: Ice Chips  DVT Prophylaxis: []PPx LMWH  []SQ Heparin  [x]IPC/SCDs  []Eliquis  []Xarelto  []Coumadin  []Other -      Code status: Limited  PT/OT

## 2024-07-17 NOTE — ANESTHESIA PRE PROCEDURE
Department of Anesthesiology  Preprocedure Note       Name:  Della Minor   Age:  84 y.o.  :  1940                                          MRN:  [id number]         Date:  2024      Surgeon: Surgeon(s):  Kian Cannon MD    Procedure: Procedure(s):  ESOPHAGOGASTRODUODENOSCOPY DIAGNOSTIC ONLY    Medications prior to admission:   Prior to Admission medications    Medication Sig Start Date End Date Taking? Authorizing Provider   vitamin D (CHOLECALCIFEROL) 125 MCG (5000 UT) CAPS capsule Take 1 capsule by mouth daily   Yes Marjorie Marte MD   losartan (COZAAR) 100 MG tablet TAKE 1 TABLET BY MOUTH DAILY 24   Eladio Osei MD   amLODIPine (NORVASC) 5 MG tablet TAKE 1 TABLET BY MOUTH DAILY 24   Eladio Osei MD   pioglitazone (ACTOS) 45 MG tablet TAKE 1 TABLET BY MOUTH DAILY 24   Eladio Osei MD   metFORMIN (GLUCOPHAGE) 500 MG tablet TAKE 1 TABLET BY MOUTH TWICE  DAILY 24   Eladio Osei MD   metoprolol tartrate (LOPRESSOR) 50 MG tablet TAKE 1 AND 1/2 TABLETS BY  MOUTH TWICE DAILY 3/4/24   Eladio Osei MD   glipiZIDE (GLUCOTROL) 10 MG tablet TAKE 1 TABLET BY MOUTH  TWICE DAILY BEFORE A MEAL 24   Eladio Osei MD   hydroCHLOROthiazide (HYDRODIURIL) 25 MG tablet TAKE 1 TABLET BY MOUTH DAILY 23   Eladio Osei MD   omeprazole (PRILOSEC) 40 MG delayed release capsule TAKE 1 CAPSULE BY MOUTH IN THE  MORNING BEFORE BREAKFAST 23   Eladio Osei MD   vitamin B-12 (CYANOCOBALAMIN) 500 MCG tablet Take 1 tablet by mouth daily    Provider, MD Marjorie       Current medications:    Current Facility-Administered Medications   Medication Dose Route Frequency Provider Last Rate Last Admin   • amLODIPine (NORVASC) tablet 5 mg  5 mg Oral Daily Mike Marshall MD   5 mg at 24   • hydroCHLOROthiazide (HYDRODIURIL) tablet 25 mg  25 mg Oral Daily Mike Marshall MD

## 2024-07-17 NOTE — FLOWSHEET NOTE
07/16/24 2127   Assessment   Charting Type Shift assessment   Psychosocial   Psychosocial (WDL) WDL   Neurological   Neuro (WDL) WDL   Level of Consciousness 0   Orientation Level Oriented to person;Oriented to place;Oriented to time;Oriented to situation   Cognition Follows commands;Appropriate safety awareness;Appropriate judgement   Speech Clear   [place] Coma Scale   Eye Opening 4   Best Verbal Response 5   Best Motor Response 6   [place] Coma Scale Score 15   HEENT (Head, Ears, Eyes, Nose, & Throat)   HEENT (WDL) X   Right Eye Impaired vision   Left Eye Impaired vision   Right Ear Hearing aid   Left Ear Hearing aid   Teeth Dentures upper;Dentures lower   Respiratory   Respiratory (WDL) WDL   Respiratory Pattern Regular   Respiratory Depth Normal   Respiratory Quality/Effort Unlabored   Chest Assessment Chest expansion symmetrical;Trachea midline   L Breath Sounds Clear   R Breath Sounds Clear   Cardiac   Cardiac (WDL) X   Cardiac Regularity Regular   Heart Sounds S1, S2   Cardiac Rhythm Atrial fib   Gastrointestinal   Abdominal (WDL) WDL   Abdomen Inspection Soft;Rotund   RUQ Bowel Sounds Active   LUQ Bowel Sounds Active   RLQ Bowel Sounds Active   LLQ Bowel Sounds Active   Genitourinary   Genitourinary (WDL) WDL   Peripheral Vascular   Peripheral Vascular (WDL) WDL   Skin Integumentary    Skin Integumentary (WDL) WDL   Musculoskeletal   Musculoskeletal (WDL) WDL

## 2024-07-17 NOTE — FLOWSHEET NOTE
Patient's family requested that nursing wait until patient wakes up to get vital signs and give medications.

## 2024-07-17 NOTE — CONSULTS
CONSULTATION  Della Minor is a 84 y.o. female asked to see us in consultation by  Eladio Osei MD & Mike Marshall MD for evaluation of GIB.      Ms. Minor is an 84 year old female with past medical history of DM, HTN, OA and PUD who presents to the ER with melena.  She states she has been sick with upper respiratory infection. Was on antibiotics and mucinex. Began having diarrhea several days ago then turned black over the last 24 hours.  She had multiple bowel movements overnight but she is not sure what color they were, nor are they documented in the chart.  She denies NSAID use and abdominal pain. She does report nausea.  No hematemesis.     She underwent an EGD in 2019 ([facility]) for N/V and epigastric pain which demonstrated a hiatal hernia and duodenal ulcer.  Gastric path was positive for H. Pylori.  She had also been using celebrex at that time.  She was treated for H pylori and stool antigen test confirmed eradication.   She no longer takes NSAIDs.  She is not on AC.    Hgb on admission was 10 which is around baseline. It dropped to 9.1 at midnight and it 9.5 this morning.  KELLIE noted.  Troponins were also mildly elevated. Cardiology was consulted.    Plan:  Continue PPI drip.  NPO.  EGD today if no objection from cardiology. Apparently they rounded on the patient earlier this morning.    Thank you for permitting us to participate in the care of your patient. Please do not hesitate to call with questions or concerns.     1.  The patient indicates understanding of these issues and agrees with the plan.  2.  I reviewed the patient's medical information and medical history.   3.  I have reviewed the past medical, family, and social history sections including the medications and allergies listed in the above medical record.        Electronically signed by: SHORTY Reyes 7/17/2024 7:41 AM

## 2024-07-17 NOTE — FLOWSHEET NOTE
07/17/24 1951   Assessment   Charting Type Shift assessment   Psychosocial   Psychosocial (WDL) WDL   Neurological   Neuro (WDL) WDL   Level of Consciousness 0   Orientation Level Oriented X4   Cognition Follows commands;Appropriate safety awareness;Appropriate judgement;Appropriate for developmental age;Appropriate attention/concentration   Speech Clear   [place] Coma Scale   Eye Opening 4   Best Verbal Response 5   Best Motor Response 6   [place] Coma Scale Score 15   HEENT (Head, Ears, Eyes, Nose, & Throat)   HEENT (WDL) X   Right Eye Impaired vision;Glasses   Left Eye Impaired vision;Glasses   Right Ear Hearing aid;Impaired hearing   Left Ear Hearing aid;Impaired hearing   Teeth Dentures upper;Dentures lower   Respiratory   Respiratory (WDL) WDL   Respiratory Pattern Regular   Respiratory Depth Normal   Respiratory Quality/Effort Unlabored   Chest Assessment Chest expansion symmetrical;Trachea midline   L Breath Sounds Clear   R Breath Sounds Clear   Level of Activity/Mobility 1   Cardiac   Cardiac Regularity Regular   Heart Sounds S1, S2   Cardiac Monitor   Cardiac/Telemetry Monitor On Portable telemetry pack applied   Gastrointestinal   Abdominal (WDL) X   Abdomen Inspection Soft;Rotund;Distended   RUQ Bowel Sounds Active   LUQ Bowel Sounds Active   RLQ Bowel Sounds Active   LLQ Bowel Sounds Active   GI Symptoms Bloating;Distention   Tenderness Soft;Nontender   Genitourinary   Genitourinary (WDL) WDL   Urine Assessment   Urinary Status Voiding   Urinary Incontinence Absent   Peripheral Vascular   Peripheral Vascular (WDL) WDL   Skin Integumentary    Skin Integumentary (WDL) WDL   Musculoskeletal   Musculoskeletal (WDL) WDL

## 2024-07-17 NOTE — PLAN OF CARE
Problem: Chronic Conditions and Co-morbidities  Goal: Patient's chronic conditions and co-morbidity symptoms are monitored and maintained or improved  Outcome: Progressing  Flowsheets (Taken 7/17/2024 1045)  Care Plan - Patient's Chronic Conditions and Co-Morbidity Symptoms are Monitored and Maintained or Improved: Monitor and assess patient's chronic conditions and comorbid symptoms for stability, deterioration, or improvement     Problem: Discharge Planning  Goal: Discharge to home or other facility with appropriate resources  Outcome: Progressing  Flowsheets (Taken 7/17/2024 1045)  Discharge to home or other facility with appropriate resources: Identify barriers to discharge with patient and caregiver     Problem: ABCDS Injury Assessment  Goal: Absence of physical injury  Outcome: Progressing     Problem: Safety - Adult  Goal: Free from fall injury  Outcome: Progressing  Flowsheets (Taken 7/17/2024 1045)  Free From Fall Injury: Instruct family/caregiver on patient safety     Problem: Pain  Goal: Verbalizes/displays adequate comfort level or baseline comfort level  Outcome: Progressing  Flowsheets (Taken 7/17/2024 1640)  Verbalizes/displays adequate comfort level or baseline comfort level: Encourage patient to monitor pain and request assistance

## 2024-07-17 NOTE — H&P
Pre-sedation Assessment    History and Physical / Pre-Sedation Assessment  Patient:  Della Minor   :   1940     Intended Procedure: EGD      HPI: 83yo F with h/o DM and PUD (H pylori related DU, 2019) is admitted with melena. Denies NSAID use.    Past Medical History:   Diagnosis Date    Diabetes mellitus (HCC)     Hyperlipidemia     Hypertension     Neuropathy     OA (osteoarthritis) 2013    Osteoarthritis     Type II or unspecified type diabetes mellitus without mention of complication, not stated as uncontrolled     Venous insufficiency      No current facility-administered medications on file prior to encounter.     Current Outpatient Medications on File Prior to Encounter   Medication Sig Dispense Refill    vitamin D (CHOLECALCIFEROL) 125 MCG (5000 UT) CAPS capsule Take 1 capsule by mouth daily      losartan (COZAAR) 100 MG tablet TAKE 1 TABLET BY MOUTH DAILY 90 tablet 3    amLODIPine (NORVASC) 5 MG tablet TAKE 1 TABLET BY MOUTH DAILY 90 tablet 3    pioglitazone (ACTOS) 45 MG tablet TAKE 1 TABLET BY MOUTH DAILY 90 tablet 3    metFORMIN (GLUCOPHAGE) 500 MG tablet TAKE 1 TABLET BY MOUTH TWICE  DAILY 180 tablet 3    metoprolol tartrate (LOPRESSOR) 50 MG tablet TAKE 1 AND 1/2 TABLETS BY  MOUTH TWICE DAILY 270 tablet 3    glipiZIDE (GLUCOTROL) 10 MG tablet TAKE 1 TABLET BY MOUTH  TWICE DAILY BEFORE A MEAL 180 tablet 3    hydroCHLOROthiazide (HYDRODIURIL) 25 MG tablet TAKE 1 TABLET BY MOUTH DAILY 90 tablet 3    omeprazole (PRILOSEC) 40 MG delayed release capsule TAKE 1 CAPSULE BY MOUTH IN THE  MORNING BEFORE BREAKFAST 90 capsule 3    vitamin B-12 (CYANOCOBALAMIN) 500 MCG tablet Take 1 tablet by mouth daily         Nurses notes reviewed and agreed.  Medications reviewed  Allergies:   Allergies   Allergen Reactions    Other Other (See Comments)     PURACOL AG dressing - burning           Physical Exam:  Vital Signs: BP (!) 130/59   Pulse 74   Temp 99.7 °F (37.6 °C) (Oral)   Resp 18   Ht 1.549 m (5'  1\")   Wt 87.3 kg (192 lb 8 oz)   LMP  (LMP Unknown)   SpO2 93%   BMI 36.37 kg/m²  Body mass index is 36.37 kg/m².  Airway:Normal  Cardiac:Normal  Pulmonary:Normal  Abdomen:Normal  Specific to procedure: Mallampati 3      Pre-Procedure Assessment/Plan:  ASA 3 - Patient with moderate systemic disease with functional limitations    Level of Sedation Plan:Deep sedation    Post Procedure plan: Return to same level of care    I assessed the patient and find that the patient is in satisfactory condition to proceed with the planned procedure and sedation plan.    I have explained the risk, benefits, and alternatives to the procedure. The patient understands and agrees to proceed.  Yes    Kian Cannon MD  3:08 PM 7/17/2024

## 2024-07-17 NOTE — PROGRESS NOTES
Assessment completed and medications given. Patient is A&Ox4 and denies any needs at this time. Call light and personal belongings are within reach. Family at bedside. Bed is in the lowest and locked position.

## 2024-07-18 ENCOUNTER — ANESTHESIA (OUTPATIENT)
Dept: ENDOSCOPY | Age: 84
End: 2024-07-18
Payer: MEDICARE

## 2024-07-18 ENCOUNTER — ANESTHESIA EVENT (OUTPATIENT)
Dept: ENDOSCOPY | Age: 84
End: 2024-07-18
Payer: MEDICARE

## 2024-07-18 LAB
GLUCOSE BLD-MCNC: 124 MG/DL (ref 70–99)
GLUCOSE BLD-MCNC: 128 MG/DL (ref 70–99)
GLUCOSE BLD-MCNC: 129 MG/DL (ref 70–99)
GLUCOSE BLD-MCNC: 234 MG/DL (ref 70–99)
HCT VFR BLD AUTO: 30.3 % (ref 36–48)
HCT VFR BLD AUTO: 30.5 % (ref 36–48)
HCT VFR BLD AUTO: 35 % (ref 36–48)
HGB BLD-MCNC: 11.3 G/DL (ref 12–16)
HGB BLD-MCNC: 9.8 G/DL (ref 12–16)
HGB BLD-MCNC: 9.9 G/DL (ref 12–16)
PERFORMED ON: ABNORMAL

## 2024-07-18 PROCEDURE — 3609019000 HC CAPSULE ENDOSCOPY: Performed by: INTERNAL MEDICINE

## 2024-07-18 PROCEDURE — 3700000000 HC ANESTHESIA ATTENDED CARE: Performed by: INTERNAL MEDICINE

## 2024-07-18 PROCEDURE — 2709999900 HC NON-CHARGEABLE SUPPLY: Performed by: INTERNAL MEDICINE

## 2024-07-18 PROCEDURE — 85018 HEMOGLOBIN: CPT

## 2024-07-18 PROCEDURE — 6370000000 HC RX 637 (ALT 250 FOR IP): Performed by: INTERNAL MEDICINE

## 2024-07-18 PROCEDURE — 3700000001 HC ADD 15 MINUTES (ANESTHESIA): Performed by: INTERNAL MEDICINE

## 2024-07-18 PROCEDURE — 3609027000 HC COLONOSCOPY: Performed by: INTERNAL MEDICINE

## 2024-07-18 PROCEDURE — 7100000011 HC PHASE II RECOVERY - ADDTL 15 MIN: Performed by: INTERNAL MEDICINE

## 2024-07-18 PROCEDURE — 2580000003 HC RX 258: Performed by: INTERNAL MEDICINE

## 2024-07-18 PROCEDURE — 7100000010 HC PHASE II RECOVERY - FIRST 15 MIN: Performed by: INTERNAL MEDICINE

## 2024-07-18 PROCEDURE — 6370000000 HC RX 637 (ALT 250 FOR IP): Performed by: NURSE PRACTITIONER

## 2024-07-18 PROCEDURE — 2500000003 HC RX 250 WO HCPCS: Performed by: NURSE ANESTHETIST, CERTIFIED REGISTERED

## 2024-07-18 PROCEDURE — 85014 HEMATOCRIT: CPT

## 2024-07-18 PROCEDURE — 6360000002 HC RX W HCPCS: Performed by: NURSE ANESTHETIST, CERTIFIED REGISTERED

## 2024-07-18 PROCEDURE — 36415 COLL VENOUS BLD VENIPUNCTURE: CPT

## 2024-07-18 PROCEDURE — 2720000010 HC SURG SUPPLY STERILE: Performed by: INTERNAL MEDICINE

## 2024-07-18 PROCEDURE — 0DJD8ZZ INSPECTION OF LOWER INTESTINAL TRACT, VIA NATURAL OR ARTIFICIAL OPENING ENDOSCOPIC: ICD-10-PCS | Performed by: ANESTHESIOLOGY

## 2024-07-18 PROCEDURE — 2580000003 HC RX 258: Performed by: ANESTHESIOLOGY

## 2024-07-18 PROCEDURE — 2060000000 HC ICU INTERMEDIATE R&B

## 2024-07-18 RX ORDER — PROPOFOL 10 MG/ML
INJECTION, EMULSION INTRAVENOUS PRN
Status: DISCONTINUED | OUTPATIENT
Start: 2024-07-18 | End: 2024-07-18 | Stop reason: SDUPTHER

## 2024-07-18 RX ORDER — LIDOCAINE HYDROCHLORIDE 20 MG/ML
INJECTION, SOLUTION INFILTRATION; PERINEURAL PRN
Status: DISCONTINUED | OUTPATIENT
Start: 2024-07-18 | End: 2024-07-18 | Stop reason: SDUPTHER

## 2024-07-18 RX ORDER — SODIUM CHLORIDE, SODIUM LACTATE, POTASSIUM CHLORIDE, CALCIUM CHLORIDE 600; 310; 30; 20 MG/100ML; MG/100ML; MG/100ML; MG/100ML
INJECTION, SOLUTION INTRAVENOUS CONTINUOUS
Status: DISCONTINUED | OUTPATIENT
Start: 2024-07-18 | End: 2024-07-18

## 2024-07-18 RX ADMIN — PROPOFOL 150 MG: 10 INJECTION, EMULSION INTRAVENOUS at 10:33

## 2024-07-18 RX ADMIN — HYDROCHLOROTHIAZIDE 25 MG: 25 TABLET ORAL at 14:30

## 2024-07-18 RX ADMIN — Medication 10 MG: at 21:19

## 2024-07-18 RX ADMIN — AMLODIPINE BESYLATE 5 MG: 5 TABLET ORAL at 14:15

## 2024-07-18 RX ADMIN — LOSARTAN POTASSIUM 100 MG: 100 TABLET, FILM COATED ORAL at 14:15

## 2024-07-18 RX ADMIN — SODIUM CHLORIDE, PRESERVATIVE FREE 10 ML: 5 INJECTION INTRAVENOUS at 21:19

## 2024-07-18 RX ADMIN — METOPROLOL TARTRATE 75 MG: 50 TABLET, FILM COATED ORAL at 14:15

## 2024-07-18 RX ADMIN — SODIUM CHLORIDE, SODIUM LACTATE, POTASSIUM CHLORIDE, AND CALCIUM CHLORIDE: .6; .31; .03; .02 INJECTION, SOLUTION INTRAVENOUS at 10:26

## 2024-07-18 RX ADMIN — LIDOCAINE HYDROCHLORIDE 100 MG: 20 INJECTION, SOLUTION INFILTRATION; PERINEURAL at 10:33

## 2024-07-18 RX ADMIN — ACETAMINOPHEN 650 MG: 325 TABLET ORAL at 14:15

## 2024-07-18 RX ADMIN — ACETAMINOPHEN 650 MG: 325 TABLET ORAL at 09:03

## 2024-07-18 ASSESSMENT — PAIN SCALES - GENERAL
PAINLEVEL_OUTOF10: 2
PAINLEVEL_OUTOF10: 3
PAINLEVEL_OUTOF10: 0
PAINLEVEL_OUTOF10: 1
PAINLEVEL_OUTOF10: 8
PAINLEVEL_OUTOF10: 2
PAINLEVEL_OUTOF10: 0

## 2024-07-18 ASSESSMENT — PAIN - FUNCTIONAL ASSESSMENT
PAIN_FUNCTIONAL_ASSESSMENT: PREVENTS OR INTERFERES SOME ACTIVE ACTIVITIES AND ADLS
PAIN_FUNCTIONAL_ASSESSMENT: ACTIVITIES ARE NOT PREVENTED

## 2024-07-18 ASSESSMENT — PAIN DESCRIPTION - LOCATION
LOCATION: NECK
LOCATION: NECK

## 2024-07-18 ASSESSMENT — PAIN DESCRIPTION - DESCRIPTORS
DESCRIPTORS: ACHING
DESCRIPTORS: ACHING

## 2024-07-18 ASSESSMENT — PAIN DESCRIPTION - ORIENTATION
ORIENTATION: POSTERIOR
ORIENTATION: POSTERIOR

## 2024-07-18 NOTE — PROGRESS NOTES
Endo staff at bedside to explain capsule endoscopy to patient along with diet modifications for the duration of the study. Patient is agreeable to proceed. Patient swallowed capsule without difficulty. Capsule endoscopy belt and straps were comfortably secured around patient. Diet instructions were reviewed with patient, family, and nurse. (NPO for 2 hours, clear liquids OK 2 hours after start of study, light meal OK 4 hours after start of study.) Endo staff will retrieve belt after completion of study.

## 2024-07-18 NOTE — CARE COORDINATION
Patient underwent EGD yesterday and colonoscopy today for melena. 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

## 2024-07-18 NOTE — PLAN OF CARE
Problem: Chronic Conditions and Co-morbidities  Goal: Patient's chronic conditions and co-morbidity symptoms are monitored and maintained or improved  7/18/2024 0115 by Serina Kelly, RN  Outcome: Progressing     Problem: Discharge Planning  Goal: Discharge to home or other facility with appropriate resources  7/18/2024 0115 by Serina Kelly, RN  Outcome: Progressing     Problem: ABCDS Injury Assessment  Goal: Absence of physical injury  7/18/2024 0115 by Serina Kelly, RN  Outcome: Progressing     Problem: Safety - Adult  Goal: Free from fall injury  7/18/2024 0115 by Serina Kelly, RN  Outcome: Progressing     Problem: Pain  Goal: Verbalizes/displays adequate comfort level or baseline comfort level  7/18/2024 0115 by Serina Kelly, RN  Outcome: Progressing

## 2024-07-18 NOTE — ANESTHESIA POSTPROCEDURE EVALUATION
Department of Anesthesiology  Postprocedure Note    Patient: Della Minor  MRN: [id number]  YOB: 1940  Date of evaluation: 7/18/2024    Procedure Summary       Date: 07/18/24 Room / Location: [place] [address] / [facility]    Anesthesia Start: 1024 Anesthesia Stop: 1050    Procedure: COLONOSCOPY DIAGNOSTIC Diagnosis:       Melena      (Melena [K92.1])    Surgeons: Yesenia Barrios MD Responsible Provider: Aba Morton MD    Anesthesia Type: MAC ASA Status: 3            Anesthesia Type: No value filed.    Emily Phase I: Emily Score: 10    Emily Phase II: Emily Score: 10    Anesthesia Post Evaluation    Patient location during evaluation: PACU  Patient participation: complete - patient participated  Level of consciousness: awake and alert  Pain score: 0  Airway patency: patent  Nausea & Vomiting: no nausea and no vomiting  Cardiovascular status: blood pressure returned to baseline  Respiratory status: acceptable  Hydration status: stable  Pain management: adequate    No notable events documented.

## 2024-07-18 NOTE — PROGRESS NOTES
Transferred to inpatient hospital room 423 via gurney accompanied by transport staff  Pt alert and oriented x4  Calm/appropriate  VSS  Iv site assessed without evidence of infiltration on arrival  Respiration  easy unlabored - auscultated -clear bilaterally anterior and posterior fields  Abd soft nondistended : BS+x4 quadrants : no nausea   Oxygen tank checked for  adequate volume before transport

## 2024-07-18 NOTE — PROGRESS NOTES
Assessment completed and medications given. Patient is A&Ox4 and denies any needs at this time. Call light and personal belongings are within reach. Standard safety measures in place.

## 2024-07-18 NOTE — ANESTHESIA PRE PROCEDURE
Department of Anesthesiology  Preprocedure Note       Name:  Della Minor   Age:  84 y.o.  :  1940                                          MRN:  [id number]         Date:  2024      Surgeon: Surgeon(s):  Delon Alvarez DO    Procedure: Procedure(s):  COLONOSCOPY DIAGNOSTIC    Medications prior to admission:   Prior to Admission medications    Medication Sig Start Date End Date Taking? Authorizing Provider   vitamin D (CHOLECALCIFEROL) 125 MCG (5000 UT) CAPS capsule Take 1 capsule by mouth daily   Yes Marjorie Marte MD   losartan (COZAAR) 100 MG tablet TAKE 1 TABLET BY MOUTH DAILY 24   Eladio Osei MD   amLODIPine (NORVASC) 5 MG tablet TAKE 1 TABLET BY MOUTH DAILY 24   Eladio Osei MD   pioglitazone (ACTOS) 45 MG tablet TAKE 1 TABLET BY MOUTH DAILY 24   Eladio Osei MD   metFORMIN (GLUCOPHAGE) 500 MG tablet TAKE 1 TABLET BY MOUTH TWICE  DAILY 24   Eladio Osei MD   metoprolol tartrate (LOPRESSOR) 50 MG tablet TAKE 1 AND 1/2 TABLETS BY  MOUTH TWICE DAILY 3/4/24   Eladio Osei MD   glipiZIDE (GLUCOTROL) 10 MG tablet TAKE 1 TABLET BY MOUTH  TWICE DAILY BEFORE A MEAL 24   Eladio Osei MD   hydroCHLOROthiazide (HYDRODIURIL) 25 MG tablet TAKE 1 TABLET BY MOUTH DAILY 23   Eladio Osei MD   omeprazole (PRILOSEC) 40 MG delayed release capsule TAKE 1 CAPSULE BY MOUTH IN THE  MORNING BEFORE BREAKFAST 23   Eladio Osei MD   vitamin B-12 (CYANOCOBALAMIN) 500 MCG tablet Take 1 tablet by mouth daily    Provider, MD Marjorie       Current medications:    Current Facility-Administered Medications   Medication Dose Route Frequency Provider Last Rate Last Admin   • perflutren lipid microspheres (DEFINITY) injection 1.5 mL  1.5 mL IntraVENous ONCE PRN Yung Cantrell MD       • amLODIPine (NORVASC) tablet 5 mg  5 mg Oral Daily Mike Marshall MD   5 mg at

## 2024-07-18 NOTE — PROGRESS NOTES
Hospital Medicine Progress Note      Date of Admission: 7/16/2024  Hospital Day: 3    Chief Admission Complaint:  melena     Subjective:  denies chest pain. Developed asymptomatic SVT following colonoscopy for about 30 minutes.    Presenting Admission History:       84 y.o. female who presented to [facility] with melena.  PMHx significant for DMII, HTN. Patient started having tarry stools today. She has  a prior history of GI bleed from an ulcer about five years ago. She had a similar presentation at that time. She was having normal stools prior to today. She denies abdominal pain. She has a normal appetite. Her BG levels are normally controlled despite being markedly elevated on arrival to the ED. She was also noted to have an up trending troponin. She denies any chest pain or SOB.     Assessment/Plan:      Current Principal Problem:  GI bleed    Acute blood loss anemia 2/2 GI bleed  - GI consulted  - NPO  - continue PPI gtt  - s/p EGD and colonoscopy without source of blood loss noted  - pill endoscopy ordered  - monitor H/H closely    Elevated troponin  - cardiology consulted  - no active chest pain  - possibly 2/2 above  - avoid heparin gtt due to active bleeding  - trop currently down trending  - echo ordered  - low concern for ACS     SVT  - intermittent during admission  - continue metoprolol  - will order event monitor on discharge    DMII  - poorly controlled  - holding home oral meds  - continue SSI     HTN  - poorly controlled  - continue norvasc, HCTZ, metoprolol    Physical Exam Performed:      General appearance:  No apparent distress  Respiratory:  Normal respiratory effort.   Cardiovascular:  Regular rate and rhythm.  Abdomen:  Soft, non-tender, non-distended.  Musculoskeletal:  No edema  Neurologic:  Non-focal  Psychiatric:  Alert and oriented    BP (!) 147/77   Pulse 69   Temp 97.6 °F (36.4 °C) (Oral)   Resp 18   Ht 1.549 m (5' 1\")   Wt 86.1 kg (189 lb 14.4 oz)   LMP  (LMP Unknown)

## 2024-07-18 NOTE — PROGRESS NOTES
1415:  CMU Called and patient was sustaining a heart rate 159-160's. Patient has no signs or symptoms and no complaints at this time. Patient has been NPO for scheduled Colonoscopy and GI Camera study until 2 pm. All medications held this morning due to NPO and procedure.   Dr. Mike Marshall notified and he came to assess the patient. Morning medications give to the patient. Will continue to monitor and see effectiveness of metoprolol and if there are any changes in condition or develops signs and symptoms     1430:  Patient is now in the 80's and no signs or symptoms. Dr. Marshall is aware and will continue to monitor

## 2024-07-18 NOTE — PROGRESS NOTES
Anesthesia and endo report received.  Pt responds to verbal stimuli. Daughter at bedside. Glasses given to daughter

## 2024-07-18 NOTE — PLAN OF CARE
Problem: Chronic Conditions and Co-morbidities  Goal: Patient's chronic conditions and co-morbidity symptoms are monitored and maintained or improved  Outcome: Progressing  Flowsheets (Taken 7/18/2024 0900)  Care Plan - Patient's Chronic Conditions and Co-Morbidity Symptoms are Monitored and Maintained or Improved: Monitor and assess patient's chronic conditions and comorbid symptoms for stability, deterioration, or improvement     Problem: Discharge Planning  Goal: Discharge to home or other facility with appropriate resources  Outcome: Progressing  Flowsheets (Taken 7/18/2024 0900)  Discharge to home or other facility with appropriate resources: Identify barriers to discharge with patient and caregiver     Problem: ABCDS Injury Assessment  Goal: Absence of physical injury  Outcome: Progressing     Problem: Safety - Adult  Goal: Free from fall injury  Outcome: Progressing  Flowsheets (Taken 7/18/2024 0900)  Free From Fall Injury: Instruct family/caregiver on patient safety     Problem: Pain  Goal: Verbalizes/displays adequate comfort level or baseline comfort level  Outcome: Progressing  Flowsheets  Taken 7/18/2024 1541  Verbalizes/displays adequate comfort level or baseline comfort level: Encourage patient to monitor pain and request assistance  Taken 7/18/2024 1419  Verbalizes/displays adequate comfort level or baseline comfort level: Encourage patient to monitor pain and request assistance  Taken 7/18/2024 1230  Verbalizes/displays adequate comfort level or baseline comfort level: Encourage patient to monitor pain and request assistance  Taken 7/18/2024 0903  Verbalizes/displays adequate comfort level or baseline comfort level: Encourage patient to monitor pain and request assistance

## 2024-07-18 NOTE — H&P
Gastroenterology Preop Assessment    Patient:   Della Minor   :    1940   Facility:   [facility]  Referring/PCP: Eladio Osei MD  Date:     2024    Subjective:   Procedure: colonoscopy     HPI/Reason for procedure:  Melena with unrevealing EGD    Past Medical History:   Diagnosis Date    Diabetes mellitus (HCC)     Hyperlipidemia     Hypertension     Neuropathy     OA (osteoarthritis) 2013    Osteoarthritis     Type II or unspecified type diabetes mellitus without mention of complication, not stated as uncontrolled     Venous insufficiency      Past Surgical History:   Procedure Laterality Date    CARPAL TUNNEL RELEASE       SECTION      KNEE SURGERY Bilateral     TOTAL KNEE ARTHROPLASTY  10    Bilateral Total Knee Replacements    UPPER GASTROINTESTINAL ENDOSCOPY N/A 2019    EGD BIOPSY performed by Delon Alvarez DO at [facility] ENDOSCOPY    UPPER GASTROINTESTINAL ENDOSCOPY N/A 2024    ESOPHAGOGASTRODUODENOSCOPY DIAGNOSTIC ONLY performed by Kian Cannon MD at [facility] ENDOSCOPY       Social:   Social History     Tobacco Use    Smoking status: Never    Smokeless tobacco: Never   Substance Use Topics    Alcohol use: No     Alcohol/week: 0.0 standard drinks of alcohol     Family:   Family History   Problem Relation Age of Onset    Heart Disease Mother     Heart Disease Father        Scheduled Medications:    amLODIPine  5 mg Oral Daily    hydroCHLOROthiazide  25 mg Oral Daily    losartan  100 mg Oral Daily    metoprolol tartrate  75 mg Oral BID    sodium chloride flush  5-40 mL IntraVENous 2 times per day    insulin lispro  0-4 Units SubCUTAneous Q4H    insulin regular  10 Units SubCUTAneous Once    melatonin  10 mg Oral Nightly       Current Medications:    Prior to Admission medications    Medication Sig Start Date End Date Taking? Authorizing Provider   vitamin D (CHOLECALCIFEROL) 125 MCG (5000 UT) CAPS capsule Take 1 capsule by  IntraVENous, PRN **OR** dextrose bolus 10% 250 mL, 250 mL, IntraVENous, PRN, Mike Marshall MD    glucagon injection 1 mg, 1 mg, SubCUTAneous, PRN, Mike Marshall MD    dextrose 10 % infusion, , IntraVENous, Continuous PRN, Mike Marshall MD    sodium chloride flush 0.9 % injection 5-40 mL, 5-40 mL, IntraVENous, 2 times per day, Mike Marshall MD, 10 mL at 07/17/24 2103    sodium chloride flush 0.9 % injection 5-40 mL, 5-40 mL, IntraVENous, PRN, Mike Marshall MD, 10 mL at 07/17/24 2344    0.9 % sodium chloride infusion, , IntraVENous, PRN, Mike Marshall MD    acetaminophen (TYLENOL) tablet 650 mg, 650 mg, Oral, Q6H PRN, 650 mg at 07/18/24 0903 **OR** acetaminophen (TYLENOL) suppository 650 mg, 650 mg, Rectal, Q6H PRN, Mike Marshall MD    insulin lispro (HUMALOG,ADMELOG) injection vial 0-4 Units, 0-4 Units, SubCUTAneous, Q4H, Mike Marshall MD    insulin regular (HUMULIN R;NOVOLIN R) injection 10 Units, 10 Units, SubCUTAneous, Once, Mike Marshall MD    melatonin disintegrating tablet 10 mg, 10 mg, Oral, Nightly, KnabbMarjoriea S, APRN - CNP    prochlorperazine (COMPAZINE) injection 10 mg, 10 mg, IntraVENous, Q6H PRN, Mike Marshall MD      Infusions:    lactated ringers IV soln 100 mL/hr at 07/18/24 1026    dextrose      sodium chloride       PRN Medications: perflutren lipid microspheres, glucose, dextrose bolus **OR** dextrose bolus, glucagon (rDNA), dextrose, sodium chloride flush, sodium chloride, acetaminophen **OR** acetaminophen, prochlorperazine  Allergies:   Allergies   Allergen Reactions    Other Other (See Comments)     PURACOL AG dressing - burning         Objective:     Physical Exam:   BP (!) 139/54   Pulse 85   Temp 99.1 °F (37.3 °C) (Oral)   Resp 16   Ht 1.549 m (5' 1\")   Wt 86.1 kg (189 lb 14.4 oz)   LMP  (LMP Unknown)   SpO2 93%   BMI 35.88 kg/m²     HEENT: NCAT  Lungs: CTAB  CV: RRR  Abd: soft, ntd  Ext: dpi    Lab and Imaging Review   Labs:  CBC:   Recent Labs     07/16/24  0712

## 2024-07-18 NOTE — ANESTHESIA POSTPROCEDURE EVALUATION
Department of Anesthesiology  Postprocedure Note    Patient: Della Minor  MRN: [id number]  YOB: 1940  Date of evaluation: 7/18/2024    Procedure Summary       Date: 07/17/24 Room / Location: [place] [address] / [facility]    Anesthesia Start: 1507 Anesthesia Stop: 1527    Procedure: ESOPHAGOGASTRODUODENOSCOPY DIAGNOSTIC ONLY Diagnosis:       Upper GI bleed      (Upper GI bleed [K92.2])    Surgeons: Kian Cannon MD Responsible Provider: Aba Morton MD    Anesthesia Type: MAC ASA Status: 3            Anesthesia Type: No value filed.    Emily Phase I: Emily Score: 10    Emily Phase II: Emily Score: 9    Anesthesia Post Evaluation    Patient location during evaluation: PACU  Patient participation: complete - patient participated  Level of consciousness: awake and alert  Pain score: 0  Airway patency: patent  Nausea & Vomiting: no nausea and no vomiting  Cardiovascular status: blood pressure returned to baseline  Respiratory status: acceptable  Hydration status: stable  Pain management: adequate    No notable events documented.

## 2024-07-19 ENCOUNTER — TELEPHONE (OUTPATIENT)
Dept: CARDIAC CATH/INVASIVE PROCEDURES | Age: 84
End: 2024-07-19

## 2024-07-19 ENCOUNTER — ANCILLARY PROCEDURE (OUTPATIENT)
Dept: CARDIOLOGY CLINIC | Age: 84
End: 2024-07-19
Payer: MEDICARE

## 2024-07-19 ENCOUNTER — APPOINTMENT (OUTPATIENT)
Age: 84
DRG: 378 | End: 2024-07-19
Attending: INTERNAL MEDICINE
Payer: MEDICARE

## 2024-07-19 VITALS
HEIGHT: 61 IN | BODY MASS INDEX: 35.68 KG/M2 | TEMPERATURE: 98.5 F | RESPIRATION RATE: 18 BRPM | DIASTOLIC BLOOD PRESSURE: 72 MMHG | SYSTOLIC BLOOD PRESSURE: 146 MMHG | HEART RATE: 76 BPM | OXYGEN SATURATION: 99 % | WEIGHT: 189 LBS

## 2024-07-19 DIAGNOSIS — I47.10 SVT (SUPRAVENTRICULAR TACHYCARDIA): ICD-10-CM

## 2024-07-19 LAB
ANION GAP SERPL CALCULATED.3IONS-SCNC: 11 MMOL/L (ref 3–16)
BUN SERPL-MCNC: 28 MG/DL (ref 7–20)
CALCIUM SERPL-MCNC: 8.6 MG/DL (ref 8.3–10.6)
CHLORIDE SERPL-SCNC: 102 MMOL/L (ref 99–110)
CO2 SERPL-SCNC: 23 MMOL/L (ref 21–32)
CREAT SERPL-MCNC: 1.4 MG/DL (ref 0.6–1.2)
DEPRECATED RDW RBC AUTO: 14.4 % (ref 12.4–15.4)
ECHO AO ASC DIAM: 2.9 CM
ECHO AO ASCENDING AORTA INDEX: 1.58 CM/M2
ECHO AO ROOT DIAM: 2.5 CM
ECHO AO ROOT INDEX: 1.36 CM/M2
ECHO AV AREA PEAK VELOCITY: 1.6 CM2
ECHO AV AREA VTI: 1.7 CM2
ECHO AV AREA/BSA PEAK VELOCITY: 0.9 CM2/M2
ECHO AV AREA/BSA VTI: 0.9 CM2/M2
ECHO AV MEAN GRADIENT: 10 MMHG
ECHO AV MEAN GRADIENT: 10 MMHG
ECHO AV MEAN VELOCITY: 1.5 M/S
ECHO AV PEAK GRADIENT: 19 MMHG
ECHO AV PEAK VELOCITY: 2.2 M/S
ECHO AV VELOCITY RATIO: 0.55
ECHO AV VTI: 45.9 CM
ECHO BSA: 1.92 M2
ECHO EST RA PRESSURE: 3 MMHG
ECHO LA AREA 2C: 26.1 CM2
ECHO LA AREA 4C: 23.7 CM2
ECHO LA DIAMETER INDEX: 2.77 CM/M2
ECHO LA DIAMETER: 5.1 CM
ECHO LA MAJOR AXIS: 5.9 CM
ECHO LA MINOR AXIS: 6.5 CM
ECHO LA TO AORTIC ROOT RATIO: 2.04
ECHO LA VOL BP: 84 ML (ref 22–52)
ECHO LA VOL MOD A2C: 84 ML (ref 22–52)
ECHO LA VOL MOD A4C: 77 ML (ref 22–52)
ECHO LA VOL/BSA BIPLANE: 46 ML/M2 (ref 16–34)
ECHO LA VOLUME INDEX MOD A2C: 46 ML/M2 (ref 16–34)
ECHO LA VOLUME INDEX MOD A4C: 42 ML/M2 (ref 16–34)
ECHO LV E' LATERAL VELOCITY: 4 CM/S
ECHO LV E' SEPTAL VELOCITY: 3 CM/S
ECHO LV EDV 3D: 79 ML
ECHO LV EDV INDEX 3D: 43 ML/M2
ECHO LV EJECTION FRACTION 3D: 60 %
ECHO LV ESV 3D: 31 ML
ECHO LV ESV INDEX 3D: 17 ML/M2
ECHO LV FRACTIONAL SHORTENING: 26 % (ref 28–44)
ECHO LV GLOBAL LONGITUDINAL STRAIN (GLS): -12.6 %
ECHO LV INTERNAL DIMENSION DIASTOLE INDEX: 2.12 CM/M2
ECHO LV INTERNAL DIMENSION DIASTOLIC: 3.9 CM (ref 3.9–5.3)
ECHO LV INTERNAL DIMENSION SYSTOLIC INDEX: 1.58 CM/M2
ECHO LV INTERNAL DIMENSION SYSTOLIC: 2.9 CM
ECHO LV IVSD: 1.2 CM (ref 0.6–0.9)
ECHO LV MASS 2D: 169.4 G (ref 67–162)
ECHO LV MASS 3D INDEX: 66.3 G/M2
ECHO LV MASS 3D: 122 G
ECHO LV MASS INDEX 2D: 92 G/M2 (ref 43–95)
ECHO LV POSTERIOR WALL DIASTOLIC: 1.3 CM (ref 0.6–0.9)
ECHO LV RELATIVE WALL THICKNESS RATIO: 0.67
ECHO LVOT AREA: 2.8 CM2
ECHO LVOT AV VTI INDEX: 0.61
ECHO LVOT DIAM: 1.9 CM
ECHO LVOT MEAN GRADIENT: 3 MMHG
ECHO LVOT PEAK GRADIENT: 6 MMHG
ECHO LVOT PEAK VELOCITY: 1.2 M/S
ECHO LVOT STROKE VOLUME INDEX: 43.4 ML/M2
ECHO LVOT SV: 79.9 ML
ECHO LVOT VTI: 28.2 CM
ECHO MV A VELOCITY: 1.03 M/S
ECHO MV E DECELERATION TIME (DT): 240 MS
ECHO MV E VELOCITY: 1.14 M/S
ECHO MV E/A RATIO: 1.11
ECHO MV E/E' LATERAL: 28.5
ECHO MV E/E' RATIO (AVERAGED): 33.25
ECHO MV E/E' SEPTAL: 38
ECHO RA AREA 4C: 13.6 CM2
ECHO RA END SYSTOLIC VOLUME APICAL 4 CHAMBER INDEX BSA: 14 ML/M2
ECHO RA VOLUME: 26 ML
ECHO RIGHT VENTRICULAR SYSTOLIC PRESSURE (RVSP): 35 MMHG
ECHO RV FREE WALL PEAK S': 15 CM/S
ECHO RV TAPSE: 1.8 CM (ref 1.7–?)
ECHO TV REGURGITANT MAX VELOCITY: 2.84 M/S
ECHO TV REGURGITANT PEAK GRADIENT: 32 MMHG
GFR SERPLBLD CREATININE-BSD FMLA CKD-EPI: 37 ML/MIN/{1.73_M2}
GLUCOSE BLD-MCNC: 132 MG/DL (ref 70–99)
GLUCOSE BLD-MCNC: 150 MG/DL (ref 70–99)
GLUCOSE BLD-MCNC: 252 MG/DL (ref 70–99)
GLUCOSE SERPL-MCNC: 142 MG/DL (ref 70–99)
HCT VFR BLD AUTO: 28.4 % (ref 36–48)
HGB BLD-MCNC: 9.5 G/DL (ref 12–16)
MCH RBC QN AUTO: 27.6 PG (ref 26–34)
MCHC RBC AUTO-ENTMCNC: 33.4 G/DL (ref 31–36)
MCV RBC AUTO: 82.6 FL (ref 80–100)
PERFORMED ON: ABNORMAL
PLATELET # BLD AUTO: 433 K/UL (ref 135–450)
PMV BLD AUTO: 7.2 FL (ref 5–10.5)
POTASSIUM SERPL-SCNC: 4.3 MMOL/L (ref 3.5–5.1)
RBC # BLD AUTO: 3.44 M/UL (ref 4–5.2)
SODIUM SERPL-SCNC: 136 MMOL/L (ref 136–145)
WBC # BLD AUTO: 8.1 K/UL (ref 4–11)

## 2024-07-19 PROCEDURE — 76376 3D RENDER W/INTRP POSTPROCES: CPT | Performed by: STUDENT IN AN ORGANIZED HEALTH CARE EDUCATION/TRAINING PROGRAM

## 2024-07-19 PROCEDURE — 76376 3D RENDER W/INTRP POSTPROCES: CPT

## 2024-07-19 PROCEDURE — 80048 BASIC METABOLIC PNL TOTAL CA: CPT

## 2024-07-19 PROCEDURE — 2580000003 HC RX 258: Performed by: INTERNAL MEDICINE

## 2024-07-19 PROCEDURE — 6370000000 HC RX 637 (ALT 250 FOR IP): Performed by: INTERNAL MEDICINE

## 2024-07-19 PROCEDURE — 93306 TTE W/DOPPLER COMPLETE: CPT | Performed by: STUDENT IN AN ORGANIZED HEALTH CARE EDUCATION/TRAINING PROGRAM

## 2024-07-19 PROCEDURE — 85027 COMPLETE CBC AUTOMATED: CPT

## 2024-07-19 RX ADMIN — INSULIN LISPRO 2 UNITS: 100 INJECTION, SOLUTION INTRAVENOUS; SUBCUTANEOUS at 12:31

## 2024-07-19 RX ADMIN — METOPROLOL TARTRATE 75 MG: 50 TABLET, FILM COATED ORAL at 08:13

## 2024-07-19 RX ADMIN — SODIUM CHLORIDE, PRESERVATIVE FREE 10 ML: 5 INJECTION INTRAVENOUS at 08:15

## 2024-07-19 RX ADMIN — LOSARTAN POTASSIUM 100 MG: 100 TABLET, FILM COATED ORAL at 08:13

## 2024-07-19 RX ADMIN — ACETAMINOPHEN 650 MG: 325 TABLET ORAL at 08:13

## 2024-07-19 RX ADMIN — AMLODIPINE BESYLATE 5 MG: 5 TABLET ORAL at 08:13

## 2024-07-19 RX ADMIN — HYDROCHLOROTHIAZIDE 25 MG: 25 TABLET ORAL at 08:13

## 2024-07-19 ASSESSMENT — PAIN SCALES - GENERAL
PAINLEVEL_OUTOF10: 1
PAINLEVEL_OUTOF10: 3

## 2024-07-19 ASSESSMENT — PAIN - FUNCTIONAL ASSESSMENT: PAIN_FUNCTIONAL_ASSESSMENT: ACTIVITIES ARE NOT PREVENTED

## 2024-07-19 ASSESSMENT — PAIN DESCRIPTION - LOCATION: LOCATION: HIP;NECK

## 2024-07-19 ASSESSMENT — PAIN DESCRIPTION - DESCRIPTORS: DESCRIPTORS: ACHING;SORE

## 2024-07-19 NOTE — TELEPHONE ENCOUNTER
Monitor company [employer]  Length of monitor 14 days  Monitor ordered by Mike Marshall, hosptialist/Dr. Yung Cantrell saw pt in the hospital  Patch ID [id number]  Device number [id number]  Monitor given to  ETHEL Rockwell .   Nurse to apply at the time of discharge.

## 2024-07-19 NOTE — CARE COORDINATION
Patient underwent a colonoscopy yesterday and developed asymptomatic SVT . Colon did not reveal source of bleeding . Capsule endoscopy ordered. 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 Following hospital course for needs.

## 2024-07-19 NOTE — DISCHARGE INSTRUCTIONS
[employer] MONITOR PATCH PATIENT INSTRUCTIONS    Remove Patch in 1 week from application.  Recalibrate and apply next patch.  Call  [employer] CUSTOMER SUPPORT: [phone] and they will assist if needed.    o PIN: [id number]    o Keep the phone with you as much as possible. Be sure to charge the phone overnight and when the battery gets low. If the phone dies completely, be sure to restart the phone and enter the PIN number to confirm the patch is connected to your phone.    o If you are away from the phone for more than 8 hours, please stay within a 30 foot range of the phone for 3 hours to ensure your data fully uploads.    o If you go somewhere with no cellphone service, still keep the phone with you and charged. Your data will upload when you reach cellphone service. You can stay outside of cellphone range for up to 10 days.    o This phone cannot make calls, take pictures, etc. It is solely for medical use and will only Bluetooth connect to your patch.    · Patch Instructions:    o Wait to exercise or shower for 30 minutes after application.    o Shower with the water stream to your back and avoid putting the patch directly under the water stream.    o Do not bathe, swim or fully submerge the patch.    o If the patch starts to lift off after showering or sweating, dry the patch with a towel and allow the adhesive to dry. It should re-adhere to your skin. If it continues to lift, use the adhesive overlay to re-adhere the patch. Video instructions for the adhesive overlay are on the phone under the Menu Tab in the right hand corner - “Help- [employer] Adhesive Overlay”    o One patch lasts for up to 7 days. If you need to wear an additional patch or replace a patch, utilize the included application instructions or the video instructions - “Menu - Help - [employer] Application”    · End of Wear - Returning the Phone    o You are responsible for returning the phone. You will be financially responsible for an

## 2024-07-19 NOTE — PROGRESS NOTES
PROGRESS NOTE    HPI: Della Minor is a(n)84 y.o. female admitted for work-up and treatment for GI bleed [K92.2].     We are following for melena.    Subjective:     She has no complaints today. Feeling well.  Denies melena last 24 hours.      Objective:     I/O last 3 completed shifts:  In: 2060 [P.O.:1850; I.V.:210]  Out: -       BP (!) 146/72   Pulse 76   Temp 98.5 °F (36.9 °C) (Oral)   Resp 18   Ht 1.549 m (5' 1\")   Wt 85.7 kg (189 lb)   LMP  (LMP Unknown)   SpO2 99%   BMI 35.71 kg/m²     Physical Exam:  HEENT: anicteric sclera, oropharyngeal membranes pink and moist.  Cor: RRR  Lungs: non-labored, no respiratory distress  Abdomen: soft, NT. No ascites.  No hepatomegaly or splenomegaly  Extremities: no edema  Neuro: alert and oriented x 3      Results:   Lab Results   Component Value Date    ALT 11 07/16/2024    AST 13 (L) 07/16/2024    ALKPHOS 72 07/16/2024    INR 1.07 07/17/2024    LIPASE 27.0 07/01/2019     Lab Results   Component Value Date    WBC 8.1 07/19/2024    HGB 9.5 (L) 07/19/2024    HCT 28.4 (L) 07/19/2024    MCV 82.6 07/19/2024     07/19/2024     BUN/Cr/glu/ALT/AST/amyl/lip:  28/1.4/--/--/--/--/-- (07/19 0629)  XR CHEST (2 VW)    Result Date: 7/16/2024  Mild cardiac enlargement, vascular prominence and increased interstitial markings.  This may reflect failure in the correct clinical setting or early interstitial process.         Impression:  84 year old female with past medical history of DM, HTN, OA and duodenal ulcer 2019 who presents to the ER with melena.     Melena. S/p EGD and colonoscopy without cause.  Capsule endoscopy pending.  No melena last 24 hours.  Hgb stable (?11.3 yesterday error).    2. Elevated troponins. Cardiology consulted. Echo pending.    Plan:  Okay to discharge from GI standpoint. Will call her with capsule results when ready and any further recommendations.    Please do not hesitate to call with  questions or concerns.      Electronically signed by: SHORTY Reyes 7/19/2024 2:05 PM     (Office) [phone]  (Fax) [phone]  Available via perfect serve

## 2024-07-19 NOTE — DISCHARGE SUMMARY
Hospital Medicine Discharge Summary    Patient: Della Minor   : 1940     Admit Date: 2024   Discharge Date:   24  Disposition:  [x]Home   []HHC  []SNF  []ECF  []Acute Rehab  []LTAC  []Hospice  Code status:  [x]Full  []DNR/CCA  []Limited (DNR/CCA with Do Not Intubate)  []DNRCC  Condition at Discharge: Stable  Primary Care Provider: Eladio Osei MD    Admitting Provider: Mike Marshall MD  Discharge Provider: Mike Marshall MD     Discharge Diagnoses:      Active Hospital Problems    Diagnosis     GI bleed [K92.2]        Presenting Admission History:      84 y.o. female who presented to [facility] with melena. PMHx significant for DMII, HTN. Patient started having tarry stools today. She has a prior history of GI bleed from an ulcer about five years ago. She had a similar presentation at that time. She was having normal stools prior to today. She denies abdominal pain. She has a normal appetite. Her BG levels are normally controlled despite being markedly elevated on arrival to the ED. She was also noted to have an up trending troponin. She denies any chest pain or SOB.      Assessment/Plan:      Acute blood loss anemia 2/2 GI bleed  - GI consulted  - tolerating PO  - s/p PPI gtt  - s/p EGD and colonoscopy without source of blood loss noted  - pill endoscopy pending  - follow up with GI next week    Elevated troponin  - cardiology consulted  - no active chest pain  - possibly 2/2 above  - avoid heparin gtt due to active bleeding  - trop currently down trending  - echo unremarkable  - low concern for ACS     SVT  - intermittent during admission  - continue metoprolol  - event monitor ordered     DMII  - poorly controlled  - resume home regimen     HTN  - poorly controlled  - continue norvasc, HCTZ, metoprolol    Physical Exam Performed:      BP (!) 146/72   Pulse 76   Temp 98.5 °F (36.9 °C) (Oral)   Resp 18   Ht 1.549 m (5' 1\")   Wt 85.7 kg (189 lb)   LMP  (LMP Unknown)

## 2024-07-19 NOTE — PROGRESS NOTES
Patient discharged to home with a family member. Cardiac event monitor applied. Verbalized understanding of cardiac monitor and demonstrated how to use the phone. All questions answered and information given on how to make follow up appointments. All belongings sent home with the patient.

## 2024-07-19 NOTE — PLAN OF CARE
Problem: Chronic Conditions and Co-morbidities  Goal: Patient's chronic conditions and co-morbidity symptoms are monitored and maintained or improved  Outcome: Progressing     Problem: Discharge Planning  Goal: Discharge to home or other facility with appropriate resources  Outcome: Progressing     Problem: ABCDS Injury Assessment  Goal: Absence of physical injury  Outcome: Progressing     Problem: Safety - Adult  Goal: Free from fall injury  Outcome: Progressing     Problem: Pain  Goal: Verbalizes/displays adequate comfort level or baseline comfort level  Outcome: Progressing

## 2024-07-20 ENCOUNTER — TELEPHONE (OUTPATIENT)
Dept: GASTROENTEROLOGY | Age: 84
End: 2024-07-20

## 2024-07-22 ENCOUNTER — TELEPHONE (OUTPATIENT)
Dept: FAMILY MEDICINE CLINIC | Age: 84
End: 2024-07-22

## 2024-07-22 ENCOUNTER — CARE COORDINATION (OUTPATIENT)
Dept: CASE MANAGEMENT | Age: 84
End: 2024-07-22

## 2024-07-22 ENCOUNTER — TELEPHONE (OUTPATIENT)
Dept: CARDIOLOGY CLINIC | Age: 84
End: 2024-07-22

## 2024-07-22 DIAGNOSIS — I10 BENIGN ESSENTIAL HYPERTENSION: Primary | ICD-10-CM

## 2024-07-22 DIAGNOSIS — I87.2 VENOUS INSUFFICIENCY: Primary | ICD-10-CM

## 2024-07-22 PROCEDURE — 1111F DSCHRG MED/CURRENT MED MERGE: CPT | Performed by: FAMILY MEDICINE

## 2024-07-22 NOTE — TELEPHONE ENCOUNTER
Pts daughter Madelaine called to see what the results of the echo were. Pt had echo completed on 7/19/24.

## 2024-07-22 NOTE — TELEPHONE ENCOUNTER
[facility] Transitions Initial Follow Up Call    Outreach made within 2 business days of discharge: Yes    Patient: Della Minor Patient : 1940   MRN: [id number]  Reason for Admission: There are no discharge diagnoses documented for the most recent discharge.  Discharge Date: 24       Spoke with: Della    Discharge department/facility: [place]    Non-face-to-face services provided:  Scheduled appointment with PCP-24  Obtained and reviewed discharge summary and/or continuity of care documents    TCM Interactive Patient Contact:  Was patient able to fill all prescriptions: Yes  Was patient instructed to bring all medications to the follow-up visit: Yes  Is patient taking all medications as directed in the discharge summary? Yes  Does patient understand their discharge instructions: Yes  Does patient have questions or concerns that need addressed prior to 7-14 day follow up office visit: no    Scheduled appointment with PCP within 7-14 days    Follow Up  Future Appointments   Date Time Provider Department Center   2024  3:00 PM Eladio Osei MD [facility] - [facility]YD KIM BROWN, RN

## 2024-07-22 NOTE — TELEPHONE ENCOUNTER
Pts daughter Madelaine called to see what the results of the echo were. Pt had echo completed on 7/19/24. Madelaine can be reached at [phone]

## 2024-07-22 NOTE — CARE COORDINATION
Care Transitions Note    Initial Call - Call within 2 business days of discharge: Yes    Patient Current Location:  [place]    Care Transition Nurse contacted the patient by telephone to perform post hospital discharge assessment, verified name and  as identifiers. Provided introduction to self, and explanation of the Care Transition Nurse role.     Patient: Della Minor    Patient : 1940   MRN: [id number]    Reason for Admission: GI bleed post colonoscopy developed SVT vital connect Heart monitor   Discharge Date: 24  RURS: Readmission Risk Score: 11.9      Last Discharge Facility       Date Complaint Diagnosis Description Type Department Provider    24  Elevated troponin ... Admission (Discharged) Mike Cutler MD    24 Rectal Bleeding Gastrointestinal hemorrhage, unspecified gastrointestinal hemorrhage type ... ED (TRANSFER) [facility] Roseline Kc, DO            Was this an external facility discharge? No    Additional needs identified to be addressed with provider   No needs identified             Method of communication with provider: none.    Patients top risk factors for readmission: medical condition-.    Interventions to address risk factors:   Education: .  Review of patient management of conditions/medications: .    Care Summary Note: CTN spoke with patient who reported she is doing ok just very tired but feels she is getting better. Patient denied any bleeding post colonoscopy and denied any cp or sob. Patient has vital connect monitor in place and CTN provided patient with cardiologist number to follow up. CTN reviewed all medication with patient who reported she is taking all as prescribed. CTN advised patient of use of urgent care or physician’s 24 hr access line if assistance is needed after hours.    Care Transition Nurse reviewed discharge instructions, medical action plan, and red flags with patient. The patient was given an opportunity to ask questions;

## 2024-07-23 ENCOUNTER — OFFICE VISIT (OUTPATIENT)
Dept: FAMILY MEDICINE CLINIC | Age: 84
End: 2024-07-23

## 2024-07-23 VITALS
HEART RATE: 78 BPM | SYSTOLIC BLOOD PRESSURE: 139 MMHG | DIASTOLIC BLOOD PRESSURE: 67 MMHG | OXYGEN SATURATION: 95 % | WEIGHT: 195 LBS | BODY MASS INDEX: 36.84 KG/M2

## 2024-07-23 DIAGNOSIS — E11.9 TYPE 2 DIABETES MELLITUS WITHOUT COMPLICATION, UNSPECIFIED WHETHER LONG TERM INSULIN USE (HCC): ICD-10-CM

## 2024-07-23 DIAGNOSIS — I10 BENIGN ESSENTIAL HYPERTENSION: ICD-10-CM

## 2024-07-23 DIAGNOSIS — K92.2 GASTROINTESTINAL HEMORRHAGE, UNSPECIFIED GASTROINTESTINAL HEMORRHAGE TYPE: Primary | ICD-10-CM

## 2024-07-23 DIAGNOSIS — D50.0 BLOOD LOSS ANEMIA: ICD-10-CM

## 2024-07-23 NOTE — TELEPHONE ENCOUNTER
Let patient know echo test shows good heart function, no new orders or changes at this time.  Thanks.

## 2024-07-23 NOTE — PROGRESS NOTES
Post-Discharge Transitional Care  Follow Up      Della Minor   YOB: 1940    Date of Office Visit:  7/23/2024  Date of Hospital Admission: 7/16/24  Date of Hospital Discharge: 7/19/24  Risk of hospital readmission (high >=14%. Medium >=10%) :Readmission Risk Score: 11.9      Care management risk score Rising risk (score 2-5) and Complex Care (Scores >=6): No Risk Score On File     Non face to face  following discharge, date last encounter closed (first attempt may have been earlier): 07/22/2024    Call initiated 2 business days of discharge: Yes    ASSESSMENT/PLAN:   Gastrointestinal hemorrhage, unspecified gastrointestinal hemorrhage type  Blood loss anemia  Benign essential hypertension  -     CBC with Auto Differential  -     Basic Metabolic Panel  Type 2 diabetes mellitus without complication, unspecified whether long term insulin use (HCC)  The source of bleeding is uncertain, the camera capsule endoscopy revealed an erosion in the ileum.  Upper and lower scope unremarkable.  Will stay off of NSAIDs and aspirin.  Using Tylenol for her ankle.  Hemoglobin was 9.5 doing updated CBC and BMP.  Does like she is getting a little stronger.  Wanted a note to go back to work Tuesday by encouraged her if she feels bad while there gets weaker she should leave before the end of shift.  FMLA papers to be completed she first missed work on July 16 and was in the hospital from July 16 through July 19.  She has a follow-up with GI August 2 and cardiology August 7.  Follow-up in 2 months.    Medical Decision Making: moderate complexity  No follow-ups on file.           Subjective:   HPI:  Follow up of Hospital problems/diagnosis(es): GI bleed 7/16-7/19 [facility]     Inpatient course: Discharge summary reviewed- see chart.    Interval history/Current status: patient was admitted due to having tarry black stools. She had both and endoscopy and colonoscopy and also had a capsule endoscopy. No cause of the

## 2024-07-24 ENCOUNTER — CARE COORDINATION (OUTPATIENT)
Dept: CASE MANAGEMENT | Age: 84
End: 2024-07-24

## 2024-07-24 DIAGNOSIS — N28.9 RENAL INSUFFICIENCY: ICD-10-CM

## 2024-07-24 DIAGNOSIS — Z76.89 ENCOUNTER TO ESTABLISH CARE: ICD-10-CM

## 2024-07-24 DIAGNOSIS — R79.9 ELEVATED BUN: Primary | ICD-10-CM

## 2024-07-24 DIAGNOSIS — D64.9 ANEMIA, UNSPECIFIED TYPE: Primary | ICD-10-CM

## 2024-07-24 LAB
ANION GAP SERPL CALCULATED.3IONS-SCNC: 15 MMOL/L (ref 3–16)
BASOPHILS # BLD: 0.1 K/UL (ref 0–0.2)
BASOPHILS NFR BLD: 0.8 %
BUN SERPL-MCNC: 34 MG/DL (ref 7–20)
CALCIUM SERPL-MCNC: 9.1 MG/DL (ref 8.3–10.6)
CHLORIDE SERPL-SCNC: 100 MMOL/L (ref 99–110)
CO2 SERPL-SCNC: 24 MMOL/L (ref 21–32)
CREAT SERPL-MCNC: 1.3 MG/DL (ref 0.6–1.2)
DEPRECATED RDW RBC AUTO: 14.7 % (ref 12.4–15.4)
EOSINOPHIL # BLD: 0.2 K/UL (ref 0–0.6)
EOSINOPHIL NFR BLD: 3.2 %
GFR SERPLBLD CREATININE-BSD FMLA CKD-EPI: 40 ML/MIN/{1.73_M2}
GLUCOSE SERPL-MCNC: 148 MG/DL (ref 70–99)
HCT VFR BLD AUTO: 28.8 % (ref 36–48)
HGB BLD-MCNC: 9.5 G/DL (ref 12–16)
LYMPHOCYTES # BLD: 1.8 K/UL (ref 1–5.1)
LYMPHOCYTES NFR BLD: 24 %
MCH RBC QN AUTO: 27.4 PG (ref 26–34)
MCHC RBC AUTO-ENTMCNC: 33 G/DL (ref 31–36)
MCV RBC AUTO: 83 FL (ref 80–100)
MONOCYTES # BLD: 0.7 K/UL (ref 0–1.3)
MONOCYTES NFR BLD: 9.9 %
NEUTROPHILS # BLD: 4.6 K/UL (ref 1.7–7.7)
NEUTROPHILS NFR BLD: 62.1 %
PLATELET # BLD AUTO: 469 K/UL (ref 135–450)
PMV BLD AUTO: 7.7 FL (ref 5–10.5)
POTASSIUM SERPL-SCNC: 5.1 MMOL/L (ref 3.5–5.1)
RBC # BLD AUTO: 3.47 M/UL (ref 4–5.2)
SODIUM SERPL-SCNC: 139 MMOL/L (ref 136–145)
WBC # BLD AUTO: 7.4 K/UL (ref 4–11)

## 2024-07-24 NOTE — RESULT ENCOUNTER NOTE
Pt V/U of Srj message. Pt says she already follows with GI. Referral to hematology placed. Labs sent to PCP

## 2024-07-24 NOTE — CARE COORDINATION
Patient had PCP HFU apt on 7/23.     Sulma PEDRAZAN, RN, [facility]  Care Transition Nurse  [phone] mobile

## 2024-07-26 ENCOUNTER — TELEPHONE (OUTPATIENT)
Dept: FAMILY MEDICINE CLINIC | Age: 84
End: 2024-07-26

## 2024-07-26 NOTE — TELEPHONE ENCOUNTER
----- Message from Leatha Bills sent at 7/26/2024 10:04 AM EDT -----  Regarding: Message  ECC Message to Provider    Relationship to Patient: Self     Additional Information :  Patient requested a Work Release Form. Send the form to   Susan Jones fax : [phone]  --------------------------------------------------------------------------------------------------------------------------    Call Back Information: OK to leave message on voicemail  Preferred Call Back Number: [phone]

## 2024-07-29 ENCOUNTER — CARE COORDINATION (OUTPATIENT)
Dept: CASE MANAGEMENT | Age: 84
End: 2024-07-29

## 2024-07-29 NOTE — CARE COORDINATION
Care Transitions Note    Follow Up Call     Patient Current Location:  [place]    Care Transition Nurse contacted the patient by telephone. Verified name and  as identifiers.    Additional needs identified to be addressed with provider   No needs identified                 Method of communication with provider: none.    Care Summary Note: CTN spoke with patient who reported she is doing alright. Patient denied any s/x of bleeding and feels she is improving. Patient reported she had follow up with PCP and was recommended she get an infusion of iron however, patient declined. Patient reported her  iron level is borderline low and if symptoms worsen she will contact provider. Patient reported her cardiac monitor is not working and she will contact the company today to troubleshoot. Denies any acute needs at present time.  Agreeable to f/u calls.  Educated on the use of urgent care or physician’s 24 hr access line if assistance is needed after hours.    Plan of care updates since last contact:  Review of patient management of conditions/medications: .       Advance Care Planning:   Does patient have an Advance Directive: reviewed during previous call, see note. .    Medication Review:  No changes since last call.     Remote Patient Monitoring:  Offered patient enrollment in the Remote Patient Monitoring (RPM) program for in-home monitoring: patient has cardiac monitor in place     Assessments:  Care Transitions Subsequent and Final Call    Subsequent and Final Calls  Do you have any ongoing symptoms?: No  Have your medications changed?: No  Do you have any questions related to your medications?: No  Do you currently have any active services?: No  Do you have any needs or concerns that I can assist you with?: No  Identified Barriers: None  Care Transitions Interventions  Other Interventions:              Follow Up Appointment:   VY appointment attended as scheduled   Future Appointments         Provider Specialty Dept

## 2024-08-01 DIAGNOSIS — K26.9 DUODENAL ULCER: ICD-10-CM

## 2024-08-02 ENCOUNTER — HOSPITAL ENCOUNTER (OUTPATIENT)
Age: 84
Discharge: HOME | End: 2024-08-02
Payer: MEDICARE

## 2024-08-02 LAB
ANION GAP SERPL CALCULATED.3IONS-SCNC: 16 MMOL/L (ref 3–16)
BASOPHILS # BLD: 0.1 K/UL (ref 0–0.2)
BASOPHILS NFR BLD: 0.8 %
BUN SERPL-MCNC: 31 MG/DL (ref 7–20)
CALCIUM SERPL-MCNC: 8.5 MG/DL (ref 8.3–10.6)
CHLORIDE SERPL-SCNC: 102 MMOL/L (ref 99–110)
CO2 SERPL-SCNC: 25 MMOL/L (ref 21–32)
CREAT SERPL-MCNC: 1.7 MG/DL (ref 0.6–1.2)
DEPRECATED RDW RBC AUTO: 15.6 % (ref 12.4–15.4)
EOSINOPHIL # BLD: 0.2 K/UL (ref 0–0.6)
EOSINOPHIL NFR BLD: 3.7 %
GFR SERPLBLD CREATININE-BSD FMLA CKD-EPI: 29 ML/MIN/{1.73_M2}
GLUCOSE SERPL-MCNC: 195 MG/DL (ref 70–99)
HCT VFR BLD AUTO: 30.3 % (ref 36–48)
HGB BLD-MCNC: 9.6 G/DL (ref 12–16)
LYMPHOCYTES # BLD: 1.6 K/UL (ref 1–5.1)
LYMPHOCYTES NFR BLD: 24.5 %
MCH RBC QN AUTO: 26.5 PG (ref 26–34)
MCHC RBC AUTO-ENTMCNC: 31.7 G/DL (ref 31–36)
MCV RBC AUTO: 83.5 FL (ref 80–100)
MONOCYTES # BLD: 0.7 K/UL (ref 0–1.3)
MONOCYTES NFR BLD: 10 %
NEUTROPHILS # BLD: 4.1 K/UL (ref 1.7–7.7)
NEUTROPHILS NFR BLD: 61 %
PLATELET # BLD AUTO: 385 K/UL (ref 135–450)
PMV BLD AUTO: 7.1 FL (ref 5–10.5)
POTASSIUM SERPL-SCNC: 4.9 MMOL/L (ref 3.5–5.1)
RBC # BLD AUTO: 3.62 M/UL (ref 4–5.2)
SODIUM SERPL-SCNC: 143 MMOL/L (ref 136–145)
WBC # BLD AUTO: 6.7 K/UL (ref 4–11)

## 2024-08-02 PROCEDURE — 80048 BASIC METABOLIC PNL TOTAL CA: CPT

## 2024-08-02 PROCEDURE — 85025 COMPLETE CBC W/AUTO DIFF WBC: CPT

## 2024-08-02 PROCEDURE — 36415 COLL VENOUS BLD VENIPUNCTURE: CPT

## 2024-08-02 RX ORDER — OMEPRAZOLE 40 MG/1
CAPSULE, DELAYED RELEASE ORAL
Qty: 90 CAPSULE | Refills: 3 | Status: SHIPPED | OUTPATIENT
Start: 2024-08-02

## 2024-08-02 NOTE — TELEPHONE ENCOUNTER
Refill Request     CONFIRM preferrred pharmacy with the patient.    If Mail Order Rx - Pend for 90 day refill.      Last Seen: Last Seen Department: 7/23/2024  Last Seen by PCP: 7/23/2024    Last Written: 9/19/23    If no future appointment scheduled, route STAFF MESSAGE with patient name to the  Pool for scheduling.      Next Appointment:   Future Appointments   Date Time Provider Department Center   8/7/2024  2:30 PM Luis Urias, APRN - CNP [facility]   8/19/2024  3:15 PM Hany Smith MD [facility] NEPH AND [facility]   9/17/2024  7:40 AM [facility]       Message sent to  to schedule appt with patient?  NO      Requested Prescriptions     Pending Prescriptions Disp Refills    omeprazole (PRILOSEC) 40 MG delayed release capsule [Pharmacy Med Name: Omeprazole 40 MG Oral Capsule Delayed Release] 90 capsule 3     Sig: TAKE 1 CAPSULE BY MOUTH IN THE  MORNING BEFORE BREAKFAST

## 2024-08-05 ENCOUNTER — CARE COORDINATION (OUTPATIENT)
Dept: CASE MANAGEMENT | Age: 84
End: 2024-08-05

## 2024-08-05 DIAGNOSIS — Z79.899 MEDICATION MANAGEMENT: Primary | ICD-10-CM

## 2024-08-05 NOTE — CARE COORDINATION
[phone]    9/17/2024 7:40 AM Eladio Osei MD [facility] [phone]            Care Transition Nurse provided contact information.  Plan for follow-up call in 6-10 days based on severity of symptoms and risk factors.  Plan for next call: self management-RPM       Sulma REMY, RN, [facility]  Care Transition Nurse  [phone] mobile

## 2024-08-07 ENCOUNTER — OFFICE VISIT (OUTPATIENT)
Dept: CARDIOLOGY CLINIC | Age: 84
End: 2024-08-07
Payer: MEDICARE

## 2024-08-07 VITALS
HEART RATE: 71 BPM | BODY MASS INDEX: 37.16 KG/M2 | OXYGEN SATURATION: 93 % | SYSTOLIC BLOOD PRESSURE: 128 MMHG | DIASTOLIC BLOOD PRESSURE: 74 MMHG | WEIGHT: 196.8 LBS | HEIGHT: 61 IN

## 2024-08-07 DIAGNOSIS — I10 PRIMARY HYPERTENSION: ICD-10-CM

## 2024-08-07 DIAGNOSIS — I47.10 PSVT (PAROXYSMAL SUPRAVENTRICULAR TACHYCARDIA) (HCC): Primary | ICD-10-CM

## 2024-08-07 PROCEDURE — 1090F PRES/ABSN URINE INCON ASSESS: CPT | Performed by: NURSE PRACTITIONER

## 2024-08-07 PROCEDURE — 1036F TOBACCO NON-USER: CPT | Performed by: NURSE PRACTITIONER

## 2024-08-07 PROCEDURE — 1123F ACP DISCUSS/DSCN MKR DOCD: CPT | Performed by: NURSE PRACTITIONER

## 2024-08-07 PROCEDURE — 1111F DSCHRG MED/CURRENT MED MERGE: CPT | Performed by: NURSE PRACTITIONER

## 2024-08-07 PROCEDURE — G8417 CALC BMI ABV UP PARAM F/U: HCPCS | Performed by: NURSE PRACTITIONER

## 2024-08-07 PROCEDURE — 99214 OFFICE O/P EST MOD 30 MIN: CPT | Performed by: NURSE PRACTITIONER

## 2024-08-07 PROCEDURE — 3074F SYST BP LT 130 MM HG: CPT | Performed by: NURSE PRACTITIONER

## 2024-08-07 PROCEDURE — G8400 PT W/DXA NO RESULTS DOC: HCPCS | Performed by: NURSE PRACTITIONER

## 2024-08-07 PROCEDURE — 3078F DIAST BP <80 MM HG: CPT | Performed by: NURSE PRACTITIONER

## 2024-08-07 PROCEDURE — G8427 DOCREV CUR MEDS BY ELIG CLIN: HCPCS | Performed by: NURSE PRACTITIONER

## 2024-08-07 NOTE — PROGRESS NOTES
Calcifications   noted about the mitral annulus. No pleural effusion identified.     No acute osseous process   identified.  Changes noted at the inferior aspects of both lungs most consistent with areas of atelectasis   or scarring.  Occasional calcified granuloma noted.  No focal opacity.  Central airways are   patent.  COMMENT:  Please note that this record was generated using voice recognition software. If there are any   questions about the content of this document, please contact the radiologist at [phone] as   some errors in transcription may have occurred.  IMPRESSION:  Atherosclerotic changes seen within the thoracic aorta.  No dissection or aneurysm.  Mild cardiomegaly.    Cardiology Labs Reviewed:   CBC: No results for input(s): \"WBC\", \"HGB\", \"HCT\", \"PLT\" in the last 72 hours.  BMP:No results for input(s): \"NA\", \"K\", \"CO2\", \"BUN\", \"CREATININE\", \"LABGLOM\", \"GLUCOSE\" in the last 72 hours.  PT/INR: No results for input(s): \"PROTIME\", \"INR\" in the last 72 hours.  APTT:No results for input(s): \"APTT\" in the last 72 hours.  FASTING LIPID PANEL:  Lab Results   Component Value Date/Time    HDL 47 06/28/2024 07:11 AM    TRIG 191 06/28/2024 07:11 AM     LIVER PROFILE:No results for input(s): \"AST\", \"ALT\" in the last 72 hours.    Invalid input(s): \"ALB\"  BNP:   Lab Results   Component Value Date/Time    PROBNP 3,495 07/16/2024 07:12 AM     Reviewed all labs and imaging today    Assessment:   PSVT: Now stable; noted 7/2024 in the setting of GI bleed, KELLIE  History of elevated troponin 7/2024 in the setting of GI bleed, KELLIE  Atherosclerotic aorta  HTN: Controlled  HLD: Uncontrolled,   DM: Hemoglobin A1c 7.5  CKD  GI bleed: 7/2024, no cause identified on EGD/colonoscopy/capsule endoscopy  Duodenal ulcer    Plan:   1.  Await cardiac monitor results  2.  Continue metoprolol, amlodipine, losartan, HCTZ  3.  Off aspirin due to GI bleed and history of duodenal ulcers  4.  Discussed statin therapy, she stopped

## 2024-08-07 NOTE — PATIENT INSTRUCTIONS
Continue current medications  Check BP at home and call the office if consistently out of goal range  IV therapy at [facility] [phone]  Follow up in 6 months with Dr. Leary

## 2024-08-08 ASSESSMENT — ENCOUNTER SYMPTOMS
RESPIRATORY NEGATIVE: 1
GASTROINTESTINAL NEGATIVE: 1

## 2024-08-09 LAB — ECHO BSA: 1.92 M2

## 2024-08-09 PROCEDURE — 93228 REMOTE 30 DAY ECG REV/REPORT: CPT | Performed by: INTERNAL MEDICINE

## 2024-08-12 ENCOUNTER — CARE COORDINATION (OUTPATIENT)
Dept: CASE MANAGEMENT | Age: 84
End: 2024-08-12

## 2024-08-12 NOTE — CARE COORDINATION
Care Transitions Note    Follow Up Call     Patient Current Location:  [place]    Care Transition Nurse contacted the patient by telephone. Verified name and  as identifiers.    Additional needs identified to be addressed with provider   No needs identified                 Method of communication with provider: none.    Care Summary Note: CTN spoke with patient who reported she is doing ok just still tired. CTN discussed with patient that she may want to discuss with provider and may need another iron level check, patient verbalized understanding. CTN advised patient of use of urgent care or physician’s 24 hr access line if assistance is needed after hours.      Plan of care updates since last contact:  Education: .       Advance Care Planning:   Does patient have an Advance Directive: reviewed during previous call, see note. .    Medication Review:  No changes since last call.     Remote Patient Monitoring:  Offered patient enrollment in the Remote Patient Monitoring (RPM) program for in-home monitoring: Yes, but did not enroll at this time: doesn't feel it is necessary at this time .    Assessments:  Care Transitions Subsequent and Final Call    Subsequent and Final Calls  Do you have any ongoing symptoms?: No  Have your medications changed?: No  Do you have any questions related to your medications?: No  Do you currently have any active services?: No  Do you have any needs or concerns that I can assist you with?: No  Identified Barriers: None  Care Transitions Interventions  Other Interventions:              Follow Up Appointment:   VY appointment attended as scheduled   Future Appointments         Provider Specialty Dept Phone    2024 3:15 PM Hany Smith MD Nephrology [phone]    2024 7:40 AM Eladio Osei MD Family Medicine [phone]            Care Transition Nurse provided contact information.  Plan for follow-up call in 6-10 days based on severity of symptoms and risk

## 2024-08-14 ENCOUNTER — HOSPITAL ENCOUNTER (OUTPATIENT)
Age: 84
Discharge: HOME | End: 2024-08-14
Payer: MEDICARE

## 2024-08-14 DIAGNOSIS — Z79.899 MEDICATION MANAGEMENT: ICD-10-CM

## 2024-08-14 LAB
ANION GAP SERPL CALCULATED.3IONS-SCNC: 11 MMOL/L (ref 3–16)
BUN SERPL-MCNC: 32 MG/DL (ref 7–20)
CALCIUM SERPL-MCNC: 8.9 MG/DL (ref 8.3–10.6)
CHLORIDE SERPL-SCNC: 101 MMOL/L (ref 99–110)
CO2 SERPL-SCNC: 25 MMOL/L (ref 21–32)
CREAT SERPL-MCNC: 1.3 MG/DL (ref 0.6–1.2)
GFR SERPLBLD CREATININE-BSD FMLA CKD-EPI: 40 ML/MIN/{1.73_M2}
GLUCOSE SERPL-MCNC: 117 MG/DL (ref 70–99)
POTASSIUM SERPL-SCNC: 4.7 MMOL/L (ref 3.5–5.1)
SODIUM SERPL-SCNC: 137 MMOL/L (ref 136–145)

## 2024-08-14 PROCEDURE — 36415 COLL VENOUS BLD VENIPUNCTURE: CPT

## 2024-08-14 PROCEDURE — 80048 BASIC METABOLIC PNL TOTAL CA: CPT

## 2024-08-19 ENCOUNTER — CARE COORDINATION (OUTPATIENT)
Dept: CASE MANAGEMENT | Age: 84
End: 2024-08-19

## 2024-09-17 ENCOUNTER — OFFICE VISIT (OUTPATIENT)
Dept: FAMILY MEDICINE CLINIC | Age: 84
End: 2024-09-17

## 2024-09-17 VITALS — BODY MASS INDEX: 38.28 KG/M2 | HEIGHT: 60 IN | OXYGEN SATURATION: 92 % | WEIGHT: 195 LBS | HEART RATE: 95 BPM

## 2024-09-17 DIAGNOSIS — I87.2 VENOUS INSUFFICIENCY: ICD-10-CM

## 2024-09-17 DIAGNOSIS — E78.2 MIXED HYPERLIPIDEMIA: ICD-10-CM

## 2024-09-17 DIAGNOSIS — E11.51 DM (DIABETES MELLITUS), TYPE 2 WITH PERIPHERAL VASCULAR COMPLICATIONS (HCC): ICD-10-CM

## 2024-09-17 DIAGNOSIS — I10 BENIGN ESSENTIAL HYPERTENSION: Primary | ICD-10-CM

## 2024-09-17 DIAGNOSIS — D50.0 BLOOD LOSS ANEMIA: ICD-10-CM

## 2024-09-17 DIAGNOSIS — I10 ELEVATED BLOOD PRESSURE READING IN OFFICE WITH DIAGNOSIS OF HYPERTENSION: ICD-10-CM

## 2024-09-17 DIAGNOSIS — M15.9 PRIMARY OSTEOARTHRITIS INVOLVING MULTIPLE JOINTS: ICD-10-CM

## 2024-09-17 LAB
BASOPHILS # BLD: 0.1 K/UL (ref 0–0.2)
BASOPHILS NFR BLD: 0.8 %
DEPRECATED RDW RBC AUTO: 16.5 % (ref 12.4–15.4)
EOSINOPHIL # BLD: 0.5 K/UL (ref 0–0.6)
EOSINOPHIL NFR BLD: 5.4 %
HCT VFR BLD AUTO: 29.3 % (ref 36–48)
HGB BLD-MCNC: 9.4 G/DL (ref 12–16)
LYMPHOCYTES # BLD: 0.8 K/UL (ref 1–5.1)
LYMPHOCYTES NFR BLD: 9.5 %
MCH RBC QN AUTO: 26.5 PG (ref 26–34)
MCHC RBC AUTO-ENTMCNC: 32.1 G/DL (ref 31–36)
MCV RBC AUTO: 82.4 FL (ref 80–100)
MONOCYTES # BLD: 0.7 K/UL (ref 0–1.3)
MONOCYTES NFR BLD: 8.3 %
NEUTROPHILS # BLD: 6.8 K/UL (ref 1.7–7.7)
NEUTROPHILS NFR BLD: 76 %
PLATELET # BLD AUTO: 305 K/UL (ref 135–450)
PMV BLD AUTO: 8.1 FL (ref 5–10.5)
RBC # BLD AUTO: 3.56 M/UL (ref 4–5.2)
WBC # BLD AUTO: 9 K/UL (ref 4–11)

## 2024-09-17 RX ORDER — HYDRALAZINE HYDROCHLORIDE 25 MG/1
25 TABLET, FILM COATED ORAL 3 TIMES DAILY
Qty: 270 TABLET | Refills: 3 | Status: SHIPPED | OUTPATIENT
Start: 2024-09-17

## 2024-09-17 RX ORDER — METOPROLOL TARTRATE 100 MG
TABLET ORAL
Qty: 180 TABLET | Refills: 0 | Status: SHIPPED | OUTPATIENT
Start: 2024-09-17

## 2024-09-18 DIAGNOSIS — D50.0 BLOOD LOSS ANEMIA: Primary | ICD-10-CM

## 2024-09-20 DIAGNOSIS — D50.0 BLOOD LOSS ANEMIA: Primary | ICD-10-CM

## 2024-11-11 DIAGNOSIS — I10 BENIGN ESSENTIAL HYPERTENSION: ICD-10-CM

## 2024-11-12 RX ORDER — METOPROLOL TARTRATE 100 MG/1
TABLET ORAL
Qty: 180 TABLET | Refills: 3 | Status: SHIPPED | OUTPATIENT
Start: 2024-11-12

## 2024-11-13 RX ORDER — GLIPIZIDE 10 MG/1
TABLET ORAL
Qty: 180 TABLET | Refills: 3 | Status: SHIPPED | OUTPATIENT
Start: 2024-11-13

## 2024-12-18 ENCOUNTER — OFFICE VISIT (OUTPATIENT)
Dept: FAMILY MEDICINE CLINIC | Age: 84
End: 2024-12-18

## 2024-12-18 VITALS
BODY MASS INDEX: 38.67 KG/M2 | RESPIRATION RATE: 17 BRPM | SYSTOLIC BLOOD PRESSURE: 140 MMHG | WEIGHT: 198 LBS | OXYGEN SATURATION: 98 % | HEART RATE: 80 BPM | DIASTOLIC BLOOD PRESSURE: 90 MMHG

## 2024-12-18 DIAGNOSIS — E11.51 DM (DIABETES MELLITUS), TYPE 2 WITH PERIPHERAL VASCULAR COMPLICATIONS (HCC): Primary | ICD-10-CM

## 2024-12-18 DIAGNOSIS — E55.9 VITAMIN D DEFICIENCY: ICD-10-CM

## 2024-12-18 DIAGNOSIS — I10 ELEVATED BLOOD PRESSURE READING IN OFFICE WITH DIAGNOSIS OF HYPERTENSION: ICD-10-CM

## 2024-12-18 DIAGNOSIS — D50.0 BLOOD LOSS ANEMIA: ICD-10-CM

## 2024-12-18 LAB
25(OH)D3 SERPL-MCNC: 67.4 NG/ML
ANION GAP SERPL CALCULATED.3IONS-SCNC: 13 MMOL/L (ref 3–16)
BASOPHILS # BLD: 0 K/UL (ref 0–0.2)
BASOPHILS NFR BLD: 0.7 %
BUN SERPL-MCNC: 26 MG/DL (ref 7–20)
CALCIUM SERPL-MCNC: 9.3 MG/DL (ref 8.3–10.6)
CHLORIDE SERPL-SCNC: 105 MMOL/L (ref 99–110)
CO2 SERPL-SCNC: 24 MMOL/L (ref 21–32)
CREAT SERPL-MCNC: 1.2 MG/DL (ref 0.6–1.2)
DEPRECATED RDW RBC AUTO: 18.4 % (ref 12.4–15.4)
EOSINOPHIL # BLD: 0.3 K/UL (ref 0–0.6)
EOSINOPHIL NFR BLD: 4.8 %
EST. AVERAGE GLUCOSE BLD GHB EST-MCNC: 157.1 MG/DL
FERRITIN SERPL IA-MCNC: 186 NG/ML (ref 15–150)
GFR SERPLBLD CREATININE-BSD FMLA CKD-EPI: 44 ML/MIN/{1.73_M2}
GLUCOSE SERPL-MCNC: 96 MG/DL (ref 70–99)
HBA1C MFR BLD: 7.1 %
HCT VFR BLD AUTO: 32.6 % (ref 36–48)
HGB BLD-MCNC: 10.7 G/DL (ref 12–16)
IRON SATN MFR SERPL: 12 % (ref 15–50)
IRON SERPL-MCNC: 40 UG/DL (ref 37–145)
LYMPHOCYTES # BLD: 1.4 K/UL (ref 1–5.1)
LYMPHOCYTES NFR BLD: 24.2 %
MCH RBC QN AUTO: 28.7 PG (ref 26–34)
MCHC RBC AUTO-ENTMCNC: 32.9 G/DL (ref 31–36)
MCV RBC AUTO: 87.2 FL (ref 80–100)
MONOCYTES # BLD: 0.7 K/UL (ref 0–1.3)
MONOCYTES NFR BLD: 13 %
NEUTROPHILS # BLD: 3.2 K/UL (ref 1.7–7.7)
NEUTROPHILS NFR BLD: 57.3 %
PLATELET # BLD AUTO: 297 K/UL (ref 135–450)
PMV BLD AUTO: 8 FL (ref 5–10.5)
POTASSIUM SERPL-SCNC: 5 MMOL/L (ref 3.5–5.1)
RBC # BLD AUTO: 3.74 M/UL (ref 4–5.2)
SODIUM SERPL-SCNC: 142 MMOL/L (ref 136–145)
TIBC SERPL-MCNC: 325 UG/DL (ref 260–445)
WBC # BLD AUTO: 5.6 K/UL (ref 4–11)

## 2024-12-18 ASSESSMENT — ENCOUNTER SYMPTOMS
SHORTNESS OF BREATH: 0
CONSTIPATION: 0
VOMITING: 0
ABDOMINAL PAIN: 0
DIARRHEA: 0
NAUSEA: 0

## 2024-12-18 NOTE — ASSESSMENT & PLAN NOTE
Chronic, at goal (stable), continue current treatment plan    Orders:    Hemoglobin A1C    Basic Metabolic Panel

## 2024-12-18 NOTE — ASSESSMENT & PLAN NOTE
Chronic, at goal (stable), continue current treatment plan.  Nephrology is managing her hypertension medications as well.    Orders:    Basic Metabolic Panel

## 2024-12-18 NOTE — PROGRESS NOTES
[facility] Family Medicine  2024    Della Minor (:  1940) is a 84 y.o. female, here for evaluation of the following medical concerns:    Chief Complaint   Patient presents with    New Patient     Transfer from Dr VAZQUEZ     Diabetes     Pt doesn't check           HPI    Patient has a past medical history of diabetes, hypertension, vitamin D deficiency, history of blood loss anemia.,  History of duodenal ulcer and GI bleed, mixed hyperlipidemia, obesity.    Patient takes chlorthalidone 25 mg, hydralazine 25 mg 3 times daily, losartan 100 mg, metoprolol 100 mg twice daily for hypertension.  Follows with cardiology. Nephrology recently changed her medication.     Blood pressures at home , but has been taking decongestants.     Patient takes glipizide 10 mg daily, metformin 500 mg twice daily, and pioglitazone 45 mg daily for diabetes.  Patient does not check her blood sugars at home. No hypoglycemic episodes.     Eye/foot exams: she is due for a diabetic retinal exam. Checks her feet regularly.     She has sinus drainage, took a peppermint at 5 AM.     Feels short of breath with anemia. 2 months ago had iron infusion. She follows with hematology for anemia.     ROS  Review of Systems   Constitutional:  Negative for activity change and appetite change.   Respiratory:  Negative for shortness of breath.    Cardiovascular:  Positive for leg swelling. Negative for chest pain and palpitations.   Gastrointestinal:  Negative for abdominal pain, constipation, diarrhea, nausea and vomiting.   Genitourinary:  Negative for difficulty urinating.   Neurological:  Negative for dizziness and numbness.       HISTORIES  Current Outpatient Medications on File Prior to Visit   Medication Sig Dispense Refill    chlorthalidone (HYGROTON) 25 MG tablet TAKE 1 TABLET BY MOUTH DAILY 90 tablet 3    glipiZIDE (GLUCOTROL) 10 MG tablet TAKE 1 TABLET BY MOUTH TWICE  DAILY BEFORE A MEAL 180 tablet 3    metoprolol (LOPRESSOR) 100

## 2024-12-18 NOTE — ASSESSMENT & PLAN NOTE
Monitored by specialist- no acute findings meriting change in the plan.  States she follows with hematology, with recent iron infusion 2 months ago.  Will recheck labs today.    Orders:    Iron and TIBC    Ferritin    CBC with Auto Differential

## 2025-02-11 ENCOUNTER — HOSPITAL ENCOUNTER (OUTPATIENT)
Age: 85
Discharge: HOME | End: 2025-02-11
Payer: MEDICARE

## 2025-02-11 DIAGNOSIS — E66.01 SEVERE OBESITY (BMI 35.0-39.9) WITH COMORBIDITY: ICD-10-CM

## 2025-02-11 DIAGNOSIS — I50.9 CONGESTIVE HEART FAILURE, UNSPECIFIED HF CHRONICITY, UNSPECIFIED HEART FAILURE TYPE (HCC): ICD-10-CM

## 2025-02-11 LAB
ALBUMIN SERPL-MCNC: 4 G/DL (ref 3.4–5)
ANION GAP SERPL CALCULATED.3IONS-SCNC: 12 MMOL/L (ref 3–16)
BUN SERPL-MCNC: 31 MG/DL (ref 7–20)
CALCIUM SERPL-MCNC: 9 MG/DL (ref 8.3–10.6)
CHLORIDE SERPL-SCNC: 103 MMOL/L (ref 99–110)
CO2 SERPL-SCNC: 27 MMOL/L (ref 21–32)
CREAT SERPL-MCNC: 1.6 MG/DL (ref 0.6–1.2)
CREAT UR-MCNC: 150 MG/DL (ref 28–259)
GFR SERPLBLD CREATININE-BSD FMLA CKD-EPI: 31 ML/MIN/{1.73_M2}
GLUCOSE SERPL-MCNC: 118 MG/DL (ref 70–99)
MICROALBUMIN UR DL<=1MG/L-MCNC: 2.8 MG/DL
MICROALBUMIN/CREAT UR: 18.7 MG/G (ref 0–30)
NT-PROBNP SERPL-MCNC: 5859 PG/ML (ref 0–449)
PHOSPHATE SERPL-MCNC: 4.6 MG/DL (ref 2.5–4.9)
POTASSIUM SERPL-SCNC: 4.8 MMOL/L (ref 3.5–5.1)
SODIUM SERPL-SCNC: 142 MMOL/L (ref 136–145)

## 2025-02-11 PROCEDURE — 80069 RENAL FUNCTION PANEL: CPT

## 2025-02-11 PROCEDURE — 36415 COLL VENOUS BLD VENIPUNCTURE: CPT

## 2025-02-11 PROCEDURE — 83880 ASSAY OF NATRIURETIC PEPTIDE: CPT

## 2025-02-11 PROCEDURE — 82043 UR ALBUMIN QUANTITATIVE: CPT

## 2025-02-11 PROCEDURE — 82570 ASSAY OF URINE CREATININE: CPT

## 2025-02-11 PROCEDURE — 83521 IG LIGHT CHAINS FREE EACH: CPT

## 2025-02-12 LAB
KAPPA LC FREE SER-MCNC: 71.6 MG/L (ref 2.37–20.73)
KAPPA LC FREE/LAMBDA FREE SER: 1.68 {RATIO} (ref 0.22–1.74)
LAMBDA LC FREE SERPL-MCNC: 42.6 MG/L (ref 4.23–27.69)
RPT COMMENT: ABNORMAL

## 2025-03-06 ENCOUNTER — HOSPITAL ENCOUNTER (OUTPATIENT)
Age: 85
Discharge: HOME | End: 2025-03-06
Payer: MEDICARE

## 2025-03-06 DIAGNOSIS — I10 PRIMARY HYPERTENSION: ICD-10-CM

## 2025-03-06 DIAGNOSIS — N18.32 STAGE 3B CHRONIC KIDNEY DISEASE (HCC): ICD-10-CM

## 2025-03-06 LAB
ALBUMIN SERPL-MCNC: 4.1 G/DL (ref 3.4–5)
ALBUMIN/GLOB SERPL: 1.2 {RATIO} (ref 1.1–2.2)
ALP SERPL-CCNC: 57 U/L (ref 40–129)
ALT SERPL-CCNC: 19 U/L (ref 10–40)
ANION GAP SERPL CALCULATED.3IONS-SCNC: 17 MMOL/L (ref 3–16)
AST SERPL-CCNC: 22 U/L (ref 15–37)
BILIRUB SERPL-MCNC: 0.3 MG/DL (ref 0–1)
BUN SERPL-MCNC: 33 MG/DL (ref 7–20)
CALCIUM SERPL-MCNC: 8.7 MG/DL (ref 8.3–10.6)
CHLORIDE SERPL-SCNC: 103 MMOL/L (ref 99–110)
CO2 SERPL-SCNC: 22 MMOL/L (ref 21–32)
CREAT SERPL-MCNC: 1.4 MG/DL (ref 0.6–1.2)
CREAT UR-MCNC: 46.5 MG/DL (ref 28–259)
DEPRECATED RDW RBC AUTO: 15.6 % (ref 12.4–15.4)
GFR SERPLBLD CREATININE-BSD FMLA CKD-EPI: 37 ML/MIN/{1.73_M2}
GLUCOSE SERPL-MCNC: 165 MG/DL (ref 70–99)
HCT VFR BLD AUTO: 35.6 % (ref 36–48)
HGB BLD-MCNC: 11.1 G/DL (ref 12–16)
MCH RBC QN AUTO: 28.6 PG (ref 26–34)
MCHC RBC AUTO-ENTMCNC: 31.3 G/DL (ref 31–36)
MCV RBC AUTO: 91.2 FL (ref 80–100)
MICROALBUMIN UR DL<=1MG/L-MCNC: <1.2 MG/DL
MICROALBUMIN/CREAT UR: NORMAL MG/G (ref 0–30)
PLATELET # BLD AUTO: 211 K/UL (ref 135–450)
PMV BLD AUTO: 7.9 FL (ref 5–10.5)
POTASSIUM SERPL-SCNC: 4.4 MMOL/L (ref 3.5–5.1)
PROT SERPL-MCNC: 7.4 G/DL (ref 6.4–8.2)
RBC # BLD AUTO: 3.9 M/UL (ref 4–5.2)
SODIUM SERPL-SCNC: 142 MMOL/L (ref 136–145)
WBC # BLD AUTO: 5.7 K/UL (ref 4–11)

## 2025-03-06 PROCEDURE — 82043 UR ALBUMIN QUANTITATIVE: CPT

## 2025-03-06 PROCEDURE — 82570 ASSAY OF URINE CREATININE: CPT

## 2025-03-06 PROCEDURE — 36415 COLL VENOUS BLD VENIPUNCTURE: CPT

## 2025-03-06 PROCEDURE — 85027 COMPLETE CBC AUTOMATED: CPT

## 2025-03-06 PROCEDURE — 82728 ASSAY OF FERRITIN: CPT

## 2025-03-06 PROCEDURE — 80053 COMPREHEN METABOLIC PANEL: CPT

## 2025-03-07 LAB — FERRITIN SERPL IA-MCNC: 168 NG/ML (ref 15–150)

## 2025-03-25 DIAGNOSIS — E11.8 TYPE 2 DIABETES MELLITUS WITH COMPLICATION (HCC): ICD-10-CM

## 2025-03-25 RX ORDER — PIOGLITAZONE 45 MG/1
45 TABLET ORAL DAILY
Qty: 90 TABLET | Refills: 0 | Status: SHIPPED | OUTPATIENT
Start: 2025-03-25

## 2025-03-25 NOTE — TELEPHONE ENCOUNTER
Refill Request     CONFIRM preferrred pharmacy with the patient.    If Mail Order Rx - Pend for 90 day refill.      Last Seen: Last Seen Department: 12/18/2024  Last Seen by PCP: 12/18/2024    Last Written: 5/9/24    If no future appointment scheduled, route STAFF MESSAGE with patient name to the  Pool for scheduling.      Next Appointment:   Future Appointments   Date Time Provider Department Center   5/12/2025  3:00 PM Donovan Geller MD [facility] NEPH AND [facility]   6/18/2025  7:30 AM Linda [facility]th[facility] MD [facility]       Message sent to  to schedule appt with patient?  N/A      Requested Prescriptions     Pending Prescriptions Disp Refills    metFORMIN (GLUCOPHAGE) 500 MG tablet 180 tablet 3     Sig: Take 1 tablet by mouth 2 times daily

## 2025-03-25 NOTE — TELEPHONE ENCOUNTER
Refill Request     CONFIRM preferrred pharmacy with the patient.    If Mail Order Rx - Pend for 90 day refill.      Last Seen: Last Seen Department: 12/18/2024  Last Seen by PCP: Visit date not found    Last Written: 5.9.24    If no future appointment scheduled, route STAFF MESSAGE with patient name to the  Pool for scheduling.      Next Appointment:   Future Appointments   Date Time Provider Department Center   5/12/2025  3:00 PM Donovan Geller MD [facility] NEPH AND [facility] Neph[facility]   6/18/2025  7:30 AM [facility]       Message sent to  to schedule appt with patient?  N/A      Requested Prescriptions     Pending Prescriptions Disp Refills    pioglitazone (ACTOS) 45 MG tablet [Pharmacy Med Name: Pioglitazone HCl 45 MG Oral Tablet] 90 tablet 3     Sig: TAKE 1 TABLET BY MOUTH DAILY

## 2025-04-08 DIAGNOSIS — K26.9 DUODENAL ULCER: ICD-10-CM

## 2025-04-08 RX ORDER — OMEPRAZOLE 40 MG/1
CAPSULE, DELAYED RELEASE ORAL
Qty: 90 CAPSULE | Refills: 1 | Status: SHIPPED | OUTPATIENT
Start: 2025-04-08

## 2025-04-08 RX ORDER — LOSARTAN POTASSIUM 100 MG/1
100 TABLET ORAL DAILY
Qty: 90 TABLET | Refills: 1 | Status: SHIPPED | OUTPATIENT
Start: 2025-04-08

## 2025-04-08 NOTE — TELEPHONE ENCOUNTER
Refill Request     CONFIRM preferrred pharmacy with the patient.    If Mail Order Rx - Pend for 90 day refill.      Last Seen: Last Seen Department: 12/18/2024  Last Seen by PCP: 12/18/2024    Last Written: omeprazole 8/2/24                          Losartan    7/11/24    If no future appointment scheduled, route STAFF MESSAGE with patient name to the  Pool for scheduling.      Next Appointment:   Future Appointments   Date Time Provider Department Center   5/12/2025  3:00 PM Donovan Geller MD [facility]   6/18/2025  7:30 AM [facility]       Message sent to  to schedule appt with patient?  NO      Requested Prescriptions      No prescriptions requested or ordered in this encounter

## 2025-04-10 DIAGNOSIS — I10 BENIGN ESSENTIAL HYPERTENSION: ICD-10-CM

## 2025-04-10 RX ORDER — METOPROLOL TARTRATE 100 MG/1
TABLET ORAL
Qty: 180 TABLET | Refills: 0 | Status: SHIPPED | OUTPATIENT
Start: 2025-04-10

## 2025-04-10 NOTE — TELEPHONE ENCOUNTER
Refill Request     CONFIRM preferrred pharmacy with the patient.    If Mail Order Rx - Pend for 90 day refill.      Last Seen: Last Seen Department: 12/18/2024  Last Seen by PCP: 12/18/2024    Last Written: 11.12.2024    If no future appointment scheduled, route STAFF MESSAGE with patient name to the  Pool for scheduling.      Next Appointment:   Future Appointments   Date Time Provider Department Center   5/12/2025  3:00 PM Donovan Geller MD [facility]   6/18/2025  7:30 AM [facility]       Message sent to  to schedule appt with patient?  NO      Requested Prescriptions     Pending Prescriptions Disp Refills    metoprolol (LOPRESSOR) 100 MG tablet 180 tablet 3     Sig: TAKE 1  TABLET BY  MOUTH TWICE DAILY

## 2025-04-14 DIAGNOSIS — E11.8 TYPE 2 DIABETES MELLITUS WITH COMPLICATION (HCC): ICD-10-CM

## 2025-05-06 ENCOUNTER — HOSPITAL ENCOUNTER (OUTPATIENT)
Age: 85
Discharge: HOME | End: 2025-05-06
Payer: MEDICARE

## 2025-05-06 DIAGNOSIS — Z86.2 HX OF IRON DEFICIENCY ANEMIA: ICD-10-CM

## 2025-05-06 DIAGNOSIS — N18.32 STAGE 3B CHRONIC KIDNEY DISEASE (HCC): ICD-10-CM

## 2025-05-06 LAB
ALBUMIN SERPL-MCNC: 4.2 G/DL (ref 3.4–5)
ALBUMIN/GLOB SERPL: 1.2 {RATIO} (ref 1.1–2.2)
ALP SERPL-CCNC: 62 U/L (ref 40–129)
ALT SERPL-CCNC: 15 U/L (ref 10–40)
ANION GAP SERPL CALCULATED.3IONS-SCNC: 11 MMOL/L (ref 3–16)
AST SERPL-CCNC: 19 U/L (ref 15–37)
BILIRUB SERPL-MCNC: <0.2 MG/DL (ref 0–1)
BUN SERPL-MCNC: 33 MG/DL (ref 7–20)
CALCIUM SERPL-MCNC: 9.6 MG/DL (ref 8.3–10.6)
CHLORIDE SERPL-SCNC: 104 MMOL/L (ref 99–110)
CO2 SERPL-SCNC: 24 MMOL/L (ref 21–32)
CREAT SERPL-MCNC: 1.4 MG/DL (ref 0.6–1.2)
CREAT UR-MCNC: 30.6 MG/DL (ref 28–259)
DEPRECATED RDW RBC AUTO: 15.2 % (ref 12.4–15.4)
FERRITIN SERPL IA-MCNC: 182 NG/ML (ref 15–150)
GFR SERPLBLD CREATININE-BSD FMLA CKD-EPI: 37 ML/MIN/{1.73_M2}
GLUCOSE SERPL-MCNC: 102 MG/DL (ref 70–99)
HCT VFR BLD AUTO: 33.3 % (ref 36–48)
HGB BLD-MCNC: 10.9 G/DL (ref 12–16)
IRON SERPL-MCNC: 49 UG/DL (ref 37–145)
MCH RBC QN AUTO: 28.9 PG (ref 26–34)
MCHC RBC AUTO-ENTMCNC: 32.7 G/DL (ref 31–36)
MCV RBC AUTO: 88.5 FL (ref 80–100)
MICROALBUMIN UR DL<=1MG/L-MCNC: <1.2 MG/DL
MICROALBUMIN/CREAT UR: NORMAL MG/G (ref 0–30)
PLATELET # BLD AUTO: 307 K/UL (ref 135–450)
PMV BLD AUTO: 7 FL (ref 5–10.5)
POTASSIUM SERPL-SCNC: 5 MMOL/L (ref 3.5–5.1)
PROT SERPL-MCNC: 7.8 G/DL (ref 6.4–8.2)
RBC # BLD AUTO: 3.76 M/UL (ref 4–5.2)
SODIUM SERPL-SCNC: 139 MMOL/L (ref 136–145)
TIBC SERPL-MCNC: 337 UG/DL (ref 260–445)
WBC # BLD AUTO: 5.1 K/UL (ref 4–11)

## 2025-05-06 PROCEDURE — 82728 ASSAY OF FERRITIN: CPT

## 2025-05-06 PROCEDURE — 80053 COMPREHEN METABOLIC PANEL: CPT

## 2025-05-06 PROCEDURE — 82043 UR ALBUMIN QUANTITATIVE: CPT

## 2025-05-06 PROCEDURE — 82570 ASSAY OF URINE CREATININE: CPT

## 2025-05-06 PROCEDURE — 85027 COMPLETE CBC AUTOMATED: CPT

## 2025-05-06 PROCEDURE — 83540 ASSAY OF IRON: CPT

## 2025-05-06 PROCEDURE — 36415 COLL VENOUS BLD VENIPUNCTURE: CPT

## 2025-05-06 PROCEDURE — 83550 IRON BINDING TEST: CPT

## 2025-05-27 RX ORDER — PIOGLITAZONE 45 MG/1
45 TABLET ORAL DAILY
Qty: 90 TABLET | Refills: 3 | Status: SHIPPED | OUTPATIENT
Start: 2025-05-27

## 2025-05-28 ENCOUNTER — PATIENT MESSAGE (OUTPATIENT)
Dept: FAMILY MEDICINE CLINIC | Age: 85
End: 2025-05-28

## 2025-06-12 DIAGNOSIS — K26.9 DUODENAL ULCER: ICD-10-CM

## 2025-06-13 RX ORDER — LOSARTAN POTASSIUM 100 MG/1
100 TABLET ORAL DAILY
Qty: 100 TABLET | Refills: 2 | Status: SHIPPED | OUTPATIENT
Start: 2025-06-13

## 2025-06-13 RX ORDER — OMEPRAZOLE 40 MG/1
CAPSULE, DELAYED RELEASE ORAL
Qty: 100 CAPSULE | Refills: 2 | Status: SHIPPED | OUTPATIENT
Start: 2025-06-13

## 2025-06-13 NOTE — TELEPHONE ENCOUNTER
Refill Request     CONFIRM preferrred pharmacy with the patient.    If Mail Order Rx - Pend for 90 day refill.      Last Seen: Last Seen Department: 12/18/2024  Last Seen by PCP: 12/18/2024    Last Written: 4/8/25    If no future appointment scheduled, route STAFF MESSAGE with patient name to the  Pool for scheduling.      Next Appointment:   Future Appointments   Date Time Provider Department Center   6/18/2025  7:30 AM Linda Hawthorne MD [facility]   11/7/2025  3:00 PM Hany Smith MD [facility]       Message sent to  to schedule appt with patient?  NO      Requested Prescriptions     Pending Prescriptions Disp Refills    omeprazole (PRILOSEC) 40 MG delayed release capsule [Pharmacy Med Name: Omeprazole 40 MG Oral Capsule Delayed Release] 100 capsule 2     Sig: TAKE 1 CAPSULE BY MOUTH IN THE  MORNING BEFORE BREAKFAST    losartan (COZAAR) 100 MG tablet [Pharmacy Med Name: Losartan Potassium 100 MG Oral Tablet] 100 tablet 2     Sig: TAKE 1 TABLET BY MOUTH DAILY

## 2025-06-18 ENCOUNTER — OFFICE VISIT (OUTPATIENT)
Dept: FAMILY MEDICINE CLINIC | Age: 85
End: 2025-06-18
Payer: MEDICARE

## 2025-06-18 VITALS
WEIGHT: 191 LBS | SYSTOLIC BLOOD PRESSURE: 167 MMHG | BODY MASS INDEX: 37.3 KG/M2 | HEART RATE: 75 BPM | OXYGEN SATURATION: 93 % | DIASTOLIC BLOOD PRESSURE: 71 MMHG | RESPIRATION RATE: 18 BRPM

## 2025-06-18 DIAGNOSIS — L98.9 SKIN LESIONS: Primary | ICD-10-CM

## 2025-06-18 DIAGNOSIS — K26.9 DUODENAL ULCER: ICD-10-CM

## 2025-06-18 DIAGNOSIS — E11.8 TYPE 2 DIABETES MELLITUS WITH COMPLICATION (HCC): ICD-10-CM

## 2025-06-18 DIAGNOSIS — I10 BENIGN ESSENTIAL HYPERTENSION: ICD-10-CM

## 2025-06-18 LAB
CHOLEST SERPL-MCNC: 249 MG/DL (ref 0–199)
HDLC SERPL-MCNC: 47 MG/DL (ref 40–60)
LDLC SERPL CALC-MCNC: 173 MG/DL
TRIGL SERPL-MCNC: 146 MG/DL (ref 0–150)
VLDLC SERPL CALC-MCNC: 29 MG/DL

## 2025-06-18 PROCEDURE — 99214 OFFICE O/P EST MOD 30 MIN: CPT

## 2025-06-18 PROCEDURE — 36415 COLL VENOUS BLD VENIPUNCTURE: CPT

## 2025-06-18 PROCEDURE — 1159F MED LIST DOCD IN RCRD: CPT

## 2025-06-18 PROCEDURE — 1123F ACP DISCUSS/DSCN MKR DOCD: CPT

## 2025-06-18 RX ORDER — PIOGLITAZONE 45 MG/1
45 TABLET ORAL DAILY
Qty: 90 TABLET | Refills: 3 | Status: SHIPPED | OUTPATIENT
Start: 2025-06-18

## 2025-06-18 RX ORDER — LOSARTAN POTASSIUM 100 MG/1
100 TABLET ORAL DAILY
Qty: 100 TABLET | Refills: 2 | Status: SHIPPED | OUTPATIENT
Start: 2025-06-18

## 2025-06-18 RX ORDER — OMEPRAZOLE 40 MG/1
CAPSULE, DELAYED RELEASE ORAL
Qty: 100 CAPSULE | Refills: 2 | Status: SHIPPED | OUTPATIENT
Start: 2025-06-18

## 2025-06-18 RX ORDER — GLIPIZIDE 10 MG/1
10 TABLET ORAL
Qty: 180 TABLET | Refills: 3 | Status: SHIPPED | OUTPATIENT
Start: 2025-06-18

## 2025-06-18 RX ORDER — METOPROLOL TARTRATE 100 MG/1
TABLET ORAL
Qty: 180 TABLET | Refills: 0 | Status: SHIPPED | OUTPATIENT
Start: 2025-06-18

## 2025-06-18 RX ORDER — HYDRALAZINE HYDROCHLORIDE 25 MG/1
25 TABLET, FILM COATED ORAL 3 TIMES DAILY
Qty: 270 TABLET | Refills: 3 | Status: SHIPPED | OUTPATIENT
Start: 2025-06-18

## 2025-06-18 SDOH — ECONOMIC STABILITY: FOOD INSECURITY: WITHIN THE PAST 12 MONTHS, YOU WORRIED THAT YOUR FOOD WOULD RUN OUT BEFORE YOU GOT MONEY TO BUY MORE.: NEVER TRUE

## 2025-06-18 SDOH — ECONOMIC STABILITY: FOOD INSECURITY: WITHIN THE PAST 12 MONTHS, THE FOOD YOU BOUGHT JUST DIDN'T LAST AND YOU DIDN'T HAVE MONEY TO GET MORE.: NEVER TRUE

## 2025-06-18 ASSESSMENT — PATIENT HEALTH QUESTIONNAIRE - PHQ9
SUM OF ALL RESPONSES TO PHQ QUESTIONS 1-9: 0
1. LITTLE INTEREST OR PLEASURE IN DOING THINGS: NOT AT ALL
SUM OF ALL RESPONSES TO PHQ QUESTIONS 1-9: 0
2. FEELING DOWN, DEPRESSED OR HOPELESS: NOT AT ALL

## 2025-06-18 NOTE — PROGRESS NOTES
Della Minor (:  1940) is a 85 y.o. female, here for evaluation of the following chief complaint(s):  Diabetes, Cholesterol Problem, and Hypertension         Assessment & Plan  1. Hypertension:  - Chronic, not at goal in office  - Home BP readings 130-150 over 70s, well-controlled at home  - Per nephrology, patient was advised to increase hydralazine to 50 mg TID if BP remained high but has not done so    2. Diabetes Mellitus:  - Chronic, stable  - Goal hemoglobin A1c for patient's age is 8% or less  - Well-managed  - Last HbA1c in December was 7.9%  - Due for repeat A1c today    3.  Iron deficiency anemia:  - Chronic, stable  - Iron levels lower normal range, ferritin elevated  - Received one iron infusion last year  - Has not seen hematology since  - No iron supplementation due to hemorrhoids causing constipation    4. CKD Stage 3B:  - Follows with nephrology, last seen a month ago  - Advised to increase hydralazine if BP remained high  - Upcoming nephrology appointment on 2025 at 3 PM    5. Hyperlipidemia:  - Due for lipid panel today  - Last lipid panel 11 months ago, stable  - Blood work will be conducted today  - No current cholesterol medication due to intolerance    6.  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 Iron level: Lower normal range. Ferritin:

## 2025-06-19 LAB
EST. AVERAGE GLUCOSE BLD GHB EST-MCNC: 148.5 MG/DL
HBA1C MFR BLD: 6.8 %

## 2025-06-22 ENCOUNTER — RESULTS FOLLOW-UP (OUTPATIENT)
Dept: FAMILY MEDICINE CLINIC | Age: 85
End: 2025-06-22

## 2025-07-17 DIAGNOSIS — I10 BENIGN ESSENTIAL HYPERTENSION: ICD-10-CM

## 2025-07-17 RX ORDER — HYDRALAZINE HYDROCHLORIDE 25 MG/1
25 TABLET, FILM COATED ORAL 3 TIMES DAILY
Qty: 270 TABLET | Refills: 3 | Status: SHIPPED | OUTPATIENT
Start: 2025-07-17

## 2025-07-17 RX ORDER — METOPROLOL TARTRATE 100 MG/1
TABLET ORAL
Qty: 180 TABLET | Refills: 0 | Status: SHIPPED | OUTPATIENT
Start: 2025-07-17

## 2025-07-17 NOTE — TELEPHONE ENCOUNTER
Refill Request     CONFIRM preferrred pharmacy with the patient.    If Mail Order Rx - Pend for 90 day refill.      Last Seen: Last Seen Department: Visit date not found  Last Seen by PCP: 6/18/2025    Last Written: 6/18/25    If no future appointment scheduled, route STAFF MESSAGE with patient name to the  Pool for scheduling.      Next Appointment:   Future Appointments   Date Time Provider Department Center   11/7/2025  3:00 PM Hany Smith MD [facility]   1/26/2026  7:30 AM [facility]       Message sent to  to schedule appt with patient?  N/A      Requested Prescriptions     Pending Prescriptions Disp Refills    hydrALAZINE (APRESOLINE) 25 MG tablet 270 tablet 3     Sig: Take 1 tablet by mouth 3 times daily    metoprolol (LOPRESSOR) 100 MG tablet 180 tablet 0     Sig: TAKE 1  TABLET BY  MOUTH TWICE DAILY

## 2025-07-31 ENCOUNTER — OUTSIDE SERVICES (OUTPATIENT)
Dept: SURGERY | Age: 85
End: 2025-07-31
Payer: MEDICARE

## 2025-07-31 DIAGNOSIS — L98.9 BENIGN SKIN LESION: Primary | ICD-10-CM

## 2025-07-31 PROCEDURE — 99213 OFFICE O/P EST LOW 20 MIN: CPT | Performed by: SURGERY

## 2025-08-22 ENCOUNTER — TELEMEDICINE (OUTPATIENT)
Dept: FAMILY MEDICINE CLINIC | Age: 85
End: 2025-08-22

## 2025-08-22 DIAGNOSIS — Z00.00 MEDICARE ANNUAL WELLNESS VISIT, SUBSEQUENT: Primary | ICD-10-CM

## 2025-08-22 ASSESSMENT — LIFESTYLE VARIABLES
HOW OFTEN DO YOU HAVE A DRINK CONTAINING ALCOHOL: NEVER
HOW MANY STANDARD DRINKS CONTAINING ALCOHOL DO YOU HAVE ON A TYPICAL DAY: PATIENT DOES NOT DRINK

## 2025-08-22 ASSESSMENT — PATIENT HEALTH QUESTIONNAIRE - PHQ9
SUM OF ALL RESPONSES TO PHQ QUESTIONS 1-9: 1
1. LITTLE INTEREST OR PLEASURE IN DOING THINGS: SEVERAL DAYS
SUM OF ALL RESPONSES TO PHQ QUESTIONS 1-9: 1
SUM OF ALL RESPONSES TO PHQ QUESTIONS 1-9: 1
2. FEELING DOWN, DEPRESSED OR HOPELESS: NOT AT ALL
SUM OF ALL RESPONSES TO PHQ QUESTIONS 1-9: 1

## (undated) DEVICE — SB 3 CAPSULE, 5-PACK: Brand: PILLCAM

## (undated) DEVICE — CANNULA NSL AD TBNG L7FT PVC STR NONFLARED PRNG O2 DEL W STD

## (undated) DEVICE — ENDOSCOPIC KIT 2 12 FT OP4 DE2 GWN SYR

## (undated) DEVICE — CONMED SCOPE SAVER BITE BLOCK, 20X27 MM: Brand: SCOPE SAVER

## (undated) DEVICE — ENDO CARRY-ON PROCEDURE KIT INCLUDES SUCTION TUBING, LUBRICANT, GAUZE, BIOHAZARD STICKER, TRANSPORT PAD AND INTERCEPT BEDSIDE KIT.: Brand: ENDO CARRY-ON PROCEDURE KIT

## (undated) DEVICE — ELECTRODE,ECG,STRESS,FOAM,3PK: Brand: MEDLINE

## (undated) DEVICE — FORCEPS ENDOSCP BX L230CM DIA28MM ALGTR CUP SPEC RETRV GI

## (undated) DEVICE — BITE BLOCK ENDOSCP AD 60 FR W/ ADJ STRP PLAS GRN BLOX